# Patient Record
Sex: FEMALE | Race: WHITE | NOT HISPANIC OR LATINO | Employment: OTHER | URBAN - METROPOLITAN AREA
[De-identification: names, ages, dates, MRNs, and addresses within clinical notes are randomized per-mention and may not be internally consistent; named-entity substitution may affect disease eponyms.]

---

## 2017-04-05 ENCOUNTER — ALLSCRIPTS OFFICE VISIT (OUTPATIENT)
Dept: OTHER | Facility: OTHER | Age: 82
End: 2017-04-05

## 2017-04-05 DIAGNOSIS — R05.9 COUGH: ICD-10-CM

## 2017-04-24 ENCOUNTER — ALLSCRIPTS OFFICE VISIT (OUTPATIENT)
Dept: OTHER | Facility: OTHER | Age: 82
End: 2017-04-24

## 2017-08-29 ENCOUNTER — ALLSCRIPTS OFFICE VISIT (OUTPATIENT)
Dept: OTHER | Facility: OTHER | Age: 82
End: 2017-08-29

## 2017-08-29 DIAGNOSIS — E78.00 PURE HYPERCHOLESTEROLEMIA: ICD-10-CM

## 2017-08-29 DIAGNOSIS — R11.0 NAUSEA: ICD-10-CM

## 2017-09-13 ENCOUNTER — GENERIC CONVERSION - ENCOUNTER (OUTPATIENT)
Dept: OTHER | Facility: OTHER | Age: 82
End: 2017-09-13

## 2017-11-10 ENCOUNTER — GENERIC CONVERSION - ENCOUNTER (OUTPATIENT)
Dept: OTHER | Facility: OTHER | Age: 82
End: 2017-11-10

## 2018-01-12 VITALS
OXYGEN SATURATION: 99 % | BODY MASS INDEX: 20.84 KG/M2 | HEIGHT: 59 IN | TEMPERATURE: 97.4 F | HEART RATE: 71 BPM | WEIGHT: 103.38 LBS | DIASTOLIC BLOOD PRESSURE: 70 MMHG | SYSTOLIC BLOOD PRESSURE: 118 MMHG

## 2018-01-12 VITALS
DIASTOLIC BLOOD PRESSURE: 80 MMHG | TEMPERATURE: 97.2 F | HEIGHT: 58 IN | WEIGHT: 105.25 LBS | OXYGEN SATURATION: 97 % | BODY MASS INDEX: 22.09 KG/M2 | SYSTOLIC BLOOD PRESSURE: 140 MMHG | HEART RATE: 82 BPM

## 2018-01-12 NOTE — PROCEDURES
Results/Data    Procedure: Electromyogram and Nerve Conduction Study  Indication: Left Upper Extremity   Referred by Dr Nawaf Nunez  The procedure's were discussed with the patient  Written consent was obtained prior to the procedure and is detailed in the patient's record  Prior to the start of the procedure a time out was taken and the identity of the patient was confirmed via name and date of birth with the patient  The correct site and the procedure to be performed were confirmed  The correct side was confirmed if applicable  The positioning of the patient was verified  The availability of the correct equipment was verified  Procedure Start Time: 9:00    Technique: A sterile concentric needle electrode was used  The patient tolerated the procedure well  There were no complications  Results  EMG LEFT UPPER EXTREMITY    Motor and sensory conduction studies were performed on the left median and ulnar nerves  The median distal motor latency is prolonged at 6 1 ms while the ulnar distal latency was normal  The motor action potential amplitudes were normal  Motor conduction velocities were normal including conduction of the ulnar nerve across the elbow  The left median F waves were relatively prolonged and ulnar F waves were normal     Left median distal sensory latency was prolonged at 4 ms with a delay in palmar stimulation at 2 3 ms  The ulnar distal sensory latency was normal with normal sensory action potential amplitudes  Concentric needle EMG was performed in various distal and proximal muscles of the left upper extremity including APB, FDI, EDC, brachial radialis, biceps, triceps in the low cervical paraspinal region  There was no evidence of spontaneous activity seen   The compound motor unit potentials were of normal configuration and interference patterns were full or full for effort    IMPRESSION: This is an abnormal EMG of the left upper extremity with changes consistent with a localized neuropathic process involving the median nerve at the wrist consistent with carpal tunnel syndrome  These changes are moderate to severe and demyelinative in nature    DOUG Jay        Signatures   Electronically signed by : Andrey Zacarias MD; Jun 9 2016  9:45AM EST                       (Author)

## 2018-01-14 VITALS
WEIGHT: 100 LBS | HEIGHT: 59 IN | HEART RATE: 80 BPM | BODY MASS INDEX: 20.16 KG/M2 | DIASTOLIC BLOOD PRESSURE: 72 MMHG | SYSTOLIC BLOOD PRESSURE: 114 MMHG

## 2018-01-22 VITALS
HEART RATE: 70 BPM | SYSTOLIC BLOOD PRESSURE: 112 MMHG | HEIGHT: 59 IN | DIASTOLIC BLOOD PRESSURE: 70 MMHG | BODY MASS INDEX: 20.76 KG/M2 | WEIGHT: 103.01 LBS

## 2018-02-13 ENCOUNTER — OFFICE VISIT (OUTPATIENT)
Dept: NEUROLOGY | Facility: CLINIC | Age: 83
End: 2018-02-13
Payer: MEDICARE

## 2018-02-13 VITALS
WEIGHT: 103 LBS | DIASTOLIC BLOOD PRESSURE: 72 MMHG | BODY MASS INDEX: 21.16 KG/M2 | HEART RATE: 72 BPM | SYSTOLIC BLOOD PRESSURE: 114 MMHG

## 2018-02-13 DIAGNOSIS — G56.02 CARPAL TUNNEL SYNDROME, LEFT: ICD-10-CM

## 2018-02-13 DIAGNOSIS — G60.9 IDIOPATHIC PERIPHERAL NEUROPATHY: ICD-10-CM

## 2018-02-13 DIAGNOSIS — G20 PARKINSON'S DISEASE (HCC): Primary | ICD-10-CM

## 2018-02-13 DIAGNOSIS — R09.89 BRUIT OF LEFT CAROTID ARTERY: ICD-10-CM

## 2018-02-13 PROCEDURE — 99213 OFFICE O/P EST LOW 20 MIN: CPT | Performed by: PSYCHIATRY & NEUROLOGY

## 2018-02-13 RX ORDER — RASAGILINE 1 MG/1
1 TABLET ORAL DAILY
COMMUNITY
End: 2018-05-14 | Stop reason: SDUPTHER

## 2018-02-13 RX ORDER — ATORVASTATIN CALCIUM 10 MG/1
1 TABLET, FILM COATED ORAL DAILY
COMMUNITY
Start: 2017-11-25 | End: 2018-08-23 | Stop reason: SDUPTHER

## 2018-02-13 RX ORDER — ACETAMINOPHEN 325 MG/1
1 TABLET ORAL DAILY PRN
COMMUNITY

## 2018-02-13 RX ORDER — MULTIVITAMIN
1 CAPSULE ORAL DAILY
COMMUNITY
Start: 1998-10-20 | End: 2018-11-27

## 2018-02-13 NOTE — PROGRESS NOTES
Roberto Peace is a 80 y o  female who returns in follow-up today for Parkinson's disease with a history of carpal tunnel syndrome and neuropathy    Assessment:  1  Parkinson's disease ([address] )    2  Bruit of left carotid artery    3  Idiopathic peripheral neuropathy    4  Carpal tunnel syndrome, left        Plan:  Continue current medications  Carotid ultrasound  Follow-up 3 months     Discussion:  Son Yu has been doing well by decreasing her Sinemet dosage in the evening  She states that when she tried to take half a pill 3 times daily her tremor was more prominent she is not interested in considering DBS  She does have a left carotid bruit which may be secondary to transmitted cardiac murmur  Have recommended carotid ultrasound and she will continue with an aspirin daily    Subjective:    HPI  Son Yu reports that she did try to decrease her Sinemet dosage to a half a pill 3 times daily and when did so she noted more tremor  She has found that by taking a full pill in the morning and around noon and then taking a half a pill in the evening she seems to be doing better  She does note some fatigue especially in the afternoon but finds that she does better if she eats lunch  She denies any symptoms of neuropathic pain and reports that her symptoms of carpal tunnel on the left hand are under good control using a wrist splint at nighttime  She denies any stroke symptoms    Past Medical History:   Diagnosis Date    Cervicalgia     Constipation     CTS (carpal tunnel syndrome)     Hypercholesteremia     Nausea     Parkinson's disease ([facility] [address] )        Family History:  History reviewed  No pertinent family history  Past Surgical History:  Past Surgical History:   Procedure Laterality Date    BACK SURGERY      BREAST LUMPECTOMY Bilateral     NEUROPLASTY / TRANSPOSITION MEDIAN NERVE AT CARPAL TUNNEL         Social History:   reports that she has never smoked   She has never used smokeless tobacco  She reports that she does not drink alcohol or use drugs  Allergies:  Patient has no known allergies  Current Outpatient Prescriptions:     acetaminophen (TYLENOL) 325 mg tablet, Take 1 tablet by mouth daily as needed, Disp: , Rfl:     aspirin 81 MG tablet, Take 1 tablet by mouth daily, Disp: , Rfl:     atorvastatin (LIPITOR) 10 mg tablet, Take 1 tablet by mouth daily, Disp: , Rfl:     carbidopa-levodopa (SINEMET)  mg per tablet, Take 2 5 tablets by mouth daily, Disp: , Rfl:     Multiple Vitamin (MULTIVITAMIN) capsule, 1 capsule daily, Disp: , Rfl:     mupirocin (BACTROBAN) 2 % ointment, Apply topically, Disp: , Rfl:     rasagiline (AZILECT) 1 MG, Take 1 tablet by mouth daily, Disp: , Rfl:     Sennosides 25 MG TABS, Take 1 tablet by mouth daily, Disp: , Rfl:     I have reviewed the past medical, social and family history, current medications, allergies, vitals, review of systems and updated this information as appropriate today     Objective:    Vitals:  Blood pressure 114/72, pulse 72, weight 46 7 kg (103 lb)  Physical Exam    Neurological Exam  GENERAL:  Well-developed well-nourished woman in no acute distress  HEENT/NECK: Head is atraumatic normocephalic, neck is supple  CARDIOVASCULAR:  Left Carotid bruit is noted  NEUROLOGIC:  Mental Status: Awake and alert without aphasia  Cranial Nerves: Extraocular movements are full  Face is symmetrical  Motor:  No drift is noted on arm extension  No rest tremor is noted  Occasional dyskinetic movements were noted  Coordination:  Able to get up out of a chair with arms folded across her chest   Finger-to-nose testing is performed accurately  Gait reveals a normal base with slight decreased arm swing  Romberg is negative          ROS:    Review of Systems   Constitutional: Negative  HENT: Negative  Eyes: Negative  Respiratory: Negative  Cardiovascular: Negative  Gastrointestinal: Negative  Endocrine: Negative  Genitourinary: Negative  Musculoskeletal: Negative  Skin: Negative  Allergic/Immunologic: Negative  Neurological: Negative  Hematological: Bruises/bleeds easily  Psychiatric/Behavioral: Negative

## 2018-02-27 ENCOUNTER — HOSPITAL ENCOUNTER (OUTPATIENT)
Dept: ULTRASOUND IMAGING | Facility: HOSPITAL | Age: 83
Discharge: HOME | End: 2018-02-27
Attending: PSYCHIATRY & NEUROLOGY
Payer: MEDICARE

## 2018-02-27 DIAGNOSIS — R09.89 BRUIT OF LEFT CAROTID ARTERY: ICD-10-CM

## 2018-02-27 PROCEDURE — 93880 EXTRACRANIAL BILAT STUDY: CPT

## 2018-02-27 PROCEDURE — 93880 EXTRACRANIAL BILAT STUDY: CPT | Performed by: SURGERY

## 2018-03-27 DIAGNOSIS — G20 PARKINSON DISEASE (HCC): Primary | ICD-10-CM

## 2018-05-14 ENCOUNTER — OFFICE VISIT (OUTPATIENT)
Dept: NEUROLOGY | Facility: CLINIC | Age: 83
End: 2018-05-14
Payer: MEDICARE

## 2018-05-14 VITALS
HEIGHT: 59 IN | DIASTOLIC BLOOD PRESSURE: 66 MMHG | HEART RATE: 69 BPM | BODY MASS INDEX: 20.96 KG/M2 | WEIGHT: 104 LBS | SYSTOLIC BLOOD PRESSURE: 116 MMHG

## 2018-05-14 DIAGNOSIS — G60.9 IDIOPATHIC PERIPHERAL NEUROPATHY: ICD-10-CM

## 2018-05-14 DIAGNOSIS — G20 PARKINSON'S DISEASE (HCC): Primary | ICD-10-CM

## 2018-05-14 DIAGNOSIS — G56.02 CARPAL TUNNEL SYNDROME, LEFT: ICD-10-CM

## 2018-05-14 PROCEDURE — 99213 OFFICE O/P EST LOW 20 MIN: CPT | Performed by: PSYCHIATRY & NEUROLOGY

## 2018-05-14 RX ORDER — RASAGILINE 1 MG/1
1 TABLET ORAL DAILY
Qty: 30 EACH | Refills: 0 | Status: SHIPPED | OUTPATIENT
Start: 2018-05-14 | End: 2018-11-26 | Stop reason: SDUPTHER

## 2018-05-14 RX ORDER — RASAGILINE 1 MG/1
1 TABLET ORAL DAILY
Qty: 30 EACH | Refills: 5 | Status: SHIPPED | OUTPATIENT
Start: 2018-05-14 | End: 2018-05-14 | Stop reason: SDUPTHER

## 2018-05-14 NOTE — PROGRESS NOTES
Vicente Bae is a 80 y o  female who returns in follow-up today with a history of Parkinson's disease    Assessment:  1  Parkinson's disease ([address] )    2  Idiopathic peripheral neuropathy    3  Carpal tunnel syndrome, left        Plan:  Continue present management  Follow-up 6 months    Discussion:  Suzie Rose is Parkinson's disease is under good control with present management  She will continue with her Sinemet and Azilect  Her carpal tunnel symptoms are under control with a wrist splint and she does have some symptoms of neuropathic pain in her right foot but not bad enough to require medication  If it becomes more severe she will notify me otherwise I will see her back in 6 months      Subjective:    HPI  Suzie Rose reports overall she is doing well  She denies any significant issues with her Parkinson's disease  She states when she walks a distance she usually uses a cane but otherwise her balance has been good as long as she is careful  She has not had any falls  She does report some burning dysesthesia in the toes of her right foot but does not occur frequently or severe enough to warrant treatment  She continues to wear her wrist splint to keep her carpal tunnel symptoms under control  Her carotid ultrasound demonstrated less than 50% stenosis bilaterally      Past Medical History:   Diagnosis Date    Bruit of left carotid artery     Cervicalgia     Constipation     CTS (carpal tunnel syndrome)     Hypercholesteremia     Nausea     Parkinson's disease ([address] )     Tremor        Family History:  Family History   Problem Relation Age of Onset    Cancer Mother        Past Surgical History:  Past Surgical History:   Procedure Laterality Date    BACK SURGERY      BREAST LUMPECTOMY Bilateral     NEUROPLASTY / TRANSPOSITION MEDIAN NERVE AT CARPAL TUNNEL         Social History:   reports that she has never smoked   She has never used smokeless tobacco  She reports that she does not drink alcohol or use drugs  Allergies:  Patient has no known allergies  Current Outpatient Prescriptions:     acetaminophen (TYLENOL) 325 mg tablet, Take 1 tablet by mouth daily as needed, Disp: , Rfl:     aspirin 81 MG tablet, Take 1 tablet by mouth daily, Disp: , Rfl:     atorvastatin (LIPITOR) 10 mg tablet, Take 1 tablet by mouth daily, Disp: , Rfl:     carbidopa-levodopa (SINEMET)  mg per tablet, TAKE ONE TABLET BY MOUTH THREE TIMES DAILY , Disp: 90 tablet, Rfl: 3    Multiple Vitamin (MULTIVITAMIN) capsule, 1 capsule daily, Disp: , Rfl:     mupirocin (BACTROBAN) 2 % ointment, Apply topically, Disp: , Rfl:     rasagiline (AZILECT) 1 MG, Take 1 tablet by mouth daily, Disp: , Rfl:     Sennosides 25 MG TABS, Take 1 tablet by mouth daily, Disp: , Rfl:     I have reviewed the past medical, social and family history, current medications, allergies, vitals, review of systems and updated this information as appropriate today     Objective:    Vitals:  Blood pressure 116/66, pulse 69, height 4' 11" (1 499 m), weight 47 2 kg (104 lb)  Physical Exam    Neurological Exam  GENERAL:  Well-developed well-nourished woman in no acute distress  HEENT/NECK: Head is atraumatic normocephalic, neck is supple  CARDIOVASCULAR:  A right Carotid bruit is noted  NEUROLOGIC:  Mental Status: Awake and alert without aphasia  Cranial Nerves: Extraocular movements are full  Face is symmetrical, a mild masked faces no  Motor:  No drift is noted on arm extension  No rest tremor cogwheeling rigidity is noted  Bradykinesia is noted  Coordination:  Able to get up out of a chair with arms folded across her chest   Gait reveals a decreased arm swing bilaterally with a mild increased base  Romberg reveals mild sway with eyes closed  ROS:    Review of Systems   Constitutional: Positive for fatigue  HENT: Negative  Eyes: Negative  Respiratory: Negative  Cardiovascular: Negative  Gastrointestinal: Negative      Endocrine: Positive for cold intolerance and heat intolerance  Genitourinary: Negative  Musculoskeletal: Negative  Skin: Negative  Allergic/Immunologic: Negative  Neurological: Negative  Hematological: Bruises/bleeds easily  Psychiatric/Behavioral: Negative

## 2018-08-01 DIAGNOSIS — G20 PARKINSON DISEASE (HCC): ICD-10-CM

## 2018-08-13 ENCOUNTER — LAB (OUTPATIENT)
Dept: LAB | Facility: CLINIC | Age: 83
End: 2018-08-13
Payer: MEDICARE

## 2018-08-13 DIAGNOSIS — E78.00 PURE HYPERCHOLESTEROLEMIA: ICD-10-CM

## 2018-08-13 DIAGNOSIS — R11.0 NAUSEA: ICD-10-CM

## 2018-08-13 LAB
ALBUMIN SERPL BCP-MCNC: 4.2 G/DL (ref 3.5–5)
ALP SERPL-CCNC: 65 U/L (ref 46–116)
ALT SERPL W P-5'-P-CCNC: 11 U/L (ref 12–78)
ANION GAP SERPL CALCULATED.3IONS-SCNC: 8 MMOL/L (ref 4–13)
AST SERPL W P-5'-P-CCNC: 10 U/L (ref 5–45)
BASOPHILS # BLD AUTO: 0.02 THOUSANDS/ΜL (ref 0–0.1)
BASOPHILS NFR BLD AUTO: 0 % (ref 0–1)
BILIRUB SERPL-MCNC: 0.69 MG/DL (ref 0.2–1)
BUN SERPL-MCNC: 19 MG/DL (ref 5–25)
CALCIUM SERPL-MCNC: 8.9 MG/DL (ref 8.3–10.1)
CHLORIDE SERPL-SCNC: 103 MMOL/L (ref 100–108)
CHOLEST SERPL-MCNC: 137 MG/DL (ref 50–200)
CO2 SERPL-SCNC: 27 MMOL/L (ref 21–32)
CREAT SERPL-MCNC: 0.81 MG/DL (ref 0.6–1.3)
EOSINOPHIL # BLD AUTO: 0.05 THOUSAND/ΜL (ref 0–0.61)
EOSINOPHIL NFR BLD AUTO: 1 % (ref 0–6)
ERYTHROCYTE [DISTWIDTH] IN BLOOD BY AUTOMATED COUNT: 11.9 % (ref 11.6–15.1)
GFR SERPL CREATININE-BSD FRML MDRD: 67 ML/MIN/1.73SQ M
GLUCOSE P FAST SERPL-MCNC: 102 MG/DL (ref 65–99)
HCT VFR BLD AUTO: 41 % (ref 34.8–46.1)
HDLC SERPL-MCNC: 56 MG/DL (ref 40–60)
HGB BLD-MCNC: 13 G/DL (ref 11.5–15.4)
IMM GRANULOCYTES # BLD AUTO: 0.01 THOUSAND/UL (ref 0–0.2)
IMM GRANULOCYTES NFR BLD AUTO: 0 % (ref 0–2)
LDLC SERPL CALC-MCNC: 60 MG/DL (ref 0–100)
LYMPHOCYTES # BLD AUTO: 1.17 THOUSANDS/ΜL (ref 0.6–4.47)
LYMPHOCYTES NFR BLD AUTO: 25 % (ref 14–44)
MCH RBC QN AUTO: 31.6 PG (ref 26.8–34.3)
MCHC RBC AUTO-ENTMCNC: 31.7 G/DL (ref 31.4–37.4)
MCV RBC AUTO: 100 FL (ref 82–98)
MONOCYTES # BLD AUTO: 0.4 THOUSAND/ΜL (ref 0.17–1.22)
MONOCYTES NFR BLD AUTO: 9 % (ref 4–12)
NEUTROPHILS # BLD AUTO: 3.02 THOUSANDS/ΜL (ref 1.85–7.62)
NEUTS SEG NFR BLD AUTO: 65 % (ref 43–75)
NRBC BLD AUTO-RTO: 0 /100 WBCS
PLATELET # BLD AUTO: 223 THOUSANDS/UL (ref 149–390)
PMV BLD AUTO: 11.3 FL (ref 8.9–12.7)
POTASSIUM SERPL-SCNC: 3.9 MMOL/L (ref 3.5–5.3)
PROT SERPL-MCNC: 8 G/DL (ref 6.4–8.2)
RBC # BLD AUTO: 4.12 MILLION/UL (ref 3.81–5.12)
SODIUM SERPL-SCNC: 138 MMOL/L (ref 136–145)
TRIGL SERPL-MCNC: 103 MG/DL
TSH SERPL DL<=0.05 MIU/L-ACNC: 1.07 UIU/ML (ref 0.36–3.74)
WBC # BLD AUTO: 4.67 THOUSAND/UL (ref 4.31–10.16)

## 2018-08-13 PROCEDURE — 80061 LIPID PANEL: CPT

## 2018-08-13 PROCEDURE — 85025 COMPLETE CBC W/AUTO DIFF WBC: CPT

## 2018-08-13 PROCEDURE — 36415 COLL VENOUS BLD VENIPUNCTURE: CPT

## 2018-08-13 PROCEDURE — 84443 ASSAY THYROID STIM HORMONE: CPT

## 2018-08-13 PROCEDURE — 80053 COMPREHEN METABOLIC PANEL: CPT

## 2018-08-23 DIAGNOSIS — E78.2 MIXED HYPERLIPIDEMIA: Primary | ICD-10-CM

## 2018-08-23 RX ORDER — ATORVASTATIN CALCIUM 10 MG/1
10 TABLET, FILM COATED ORAL DAILY
Qty: 90 TABLET | Refills: 2 | Status: SHIPPED | OUTPATIENT
Start: 2018-08-23 | End: 2019-05-30 | Stop reason: SDUPTHER

## 2018-08-27 ENCOUNTER — OFFICE VISIT (OUTPATIENT)
Dept: NEUROLOGY | Facility: CLINIC | Age: 83
End: 2018-08-27
Payer: MEDICARE

## 2018-08-27 VITALS
HEIGHT: 59 IN | SYSTOLIC BLOOD PRESSURE: 118 MMHG | BODY MASS INDEX: 20.4 KG/M2 | DIASTOLIC BLOOD PRESSURE: 70 MMHG | WEIGHT: 101.2 LBS | HEART RATE: 66 BPM

## 2018-08-27 DIAGNOSIS — G20 PARKINSON'S DISEASE (HCC): Primary | ICD-10-CM

## 2018-08-27 DIAGNOSIS — G60.9 IDIOPATHIC PERIPHERAL NEUROPATHY: ICD-10-CM

## 2018-08-27 PROCEDURE — 99213 OFFICE O/P EST LOW 20 MIN: CPT | Performed by: PSYCHIATRY & NEUROLOGY

## 2018-08-27 NOTE — PROGRESS NOTES
Phylicia Gagnon is a 80 y o  female who returns in follow-up today with history of Parkinson's disease and peripheral neuropathy    Assessment:  1  Parkinson's disease ([facility] [address] )    2  Idiopathic peripheral neuropathy        Plan:  Continue current management  Trial of melatonin at nighttime  Keep follow-up appointment in November    Discussion:  Nickie Loyd reports fatigue during the day  She states that frequently she has to take a couple of naps  She wondered if it was related to the Parkinson's disease  I explained that it was more likely related to the fact that she is not sleeping well at night because she sleeping intermittently during the day  She will try melatonin at nighttime, continue with her current Parkinson's medications and follow up with me in November      Subjective:    HPI  Nickie Loyd returns in follow-up today with a new complaint  She reports that she finds that she is fatigued during the day  She states that after she eats breakfast she often feels tired and takes a nap  She states that after she finishes lunch she also takes a nap  With questioning she states that she does not sleep much at night and she thinks this might be because she is napping during the day  She reports occasional tremor when she is tired but does not note any end of dose phenomena  She tolerates her medicine without adverse effect  She denies any significant issues with neuropathic pain in her feet  She does note that her balance is not as good as it should be and sometimes ambulates with a straight cane    She has not had any falls      Past Medical History:   Diagnosis Date    Bruit of left carotid artery     Cervicalgia     Constipation     CTS (carpal tunnel syndrome)     Hypercholesteremia     Nausea     Parkinson's disease ([facility] [address] [facility]     Tremor        Family History:  Family History   Problem Relation Age of Onset    Cancer Mother     No Known Problems Father     Diabetes Sister     Heart disease Sister Past Surgical History:  Past Surgical History:   Procedure Laterality Date    BACK SURGERY      BREAST LUMPECTOMY Bilateral     NEUROPLASTY / TRANSPOSITION MEDIAN NERVE AT CARPAL TUNNEL         Social History:   reports that she has never smoked  She has never used smokeless tobacco  She reports that she does not drink alcohol or use drugs  Allergies:  Patient has no known allergies  Current Outpatient Prescriptions:     acetaminophen (TYLENOL) 325 mg tablet, Take 1 tablet by mouth daily as needed, Disp: , Rfl:     aspirin 81 MG tablet, Take 1 tablet by mouth daily, Disp: , Rfl:     atorvastatin (LIPITOR) 10 mg tablet, Take 1 tablet (10 mg total) by mouth daily, Disp: 90 tablet, Rfl: 2    carbidopa-levodopa (SINEMET)  mg per tablet, TAKE ONE TABLET BY MOUTH THREE TIMES DAILY  (Patient taking differently: 1 tab Every Morning - 1 tab at 12 pm --- 1/2 tab with Dinner), Disp: 90 tablet, Rfl: 2    Multiple Vitamin (MULTIVITAMIN) capsule, 1 capsule daily, Disp: , Rfl:     mupirocin (BACTROBAN) 2 % ointment, Apply topically, Disp: , Rfl:     rasagiline (AZILECT) 1 MG, Take 1 tablet (1 mg total) by mouth daily, Disp: 30 each, Rfl: 0    Sennosides 25 MG TABS, Take 1 tablet by mouth daily, Disp: , Rfl:     I have reviewed the past medical, social and family history, current medications, allergies, vitals, review of systems and updated this information as appropriate today     Objective:    Vitals:  Blood pressure 118/70, pulse 66, height 4' 10 75" (1 492 m), weight 45 9 kg (101 lb 3 2 oz)  Physical Exam    Neurological Exam  GENERAL:  Well-developed well-nourished woman in no acute distress  HEENT/NECK: Head is atraumatic normocephalic, neck is supple  CARDIOVASCULAR:   Bilateral Carotid bruit, suspect secondary to systolic murmur  NEUROLOGIC:  Mental Status: Awake and alert without aphasia  Cranial Nerves: Extraocular movements are full   Face is symmetrical  Motor:   No drift is noted on arm extension  Bradykinesia is noted  No rest tremors noted  No cogwheeling rigidity is noted  Coordination:   Finger-to-nose testing is performed accurately  Romberg reveals mild sway with eyes closed  Gait reveals a normal base with decreased arm swing  Able to get up out of the chair with arms folded across her chest            ROS:    Review of Systems   Constitutional: Negative  Negative for appetite change and fever  HENT: Positive for trouble swallowing and voice change  Negative for drooling, hearing loss and tinnitus  Eyes: Negative  Negative for photophobia and pain  Respiratory: Positive for shortness of breath  Cardiovascular: Positive for chest pain  Negative for palpitations  Gastrointestinal: Negative  Negative for abdominal pain, nausea and vomiting  Endocrine: Negative  Negative for cold intolerance and heat intolerance  Genitourinary: Positive for urgency  Negative for dysuria and frequency  Musculoskeletal: Positive for back pain and gait problem  Negative for myalgias and neck pain  Skin: Negative  Negative for rash  Neurological: Positive for tremors and numbness  Negative for dizziness, seizures, syncope, facial asymmetry, speech difficulty, weakness, light-headedness and headaches  Hematological: Negative  Does not bruise/bleed easily  Psychiatric/Behavioral: Negative  Negative for confusion, hallucinations and sleep disturbance

## 2018-11-26 DIAGNOSIS — G20 PARKINSON'S DISEASE (HCC): ICD-10-CM

## 2018-11-26 RX ORDER — RASAGILINE 1 MG/1
TABLET ORAL
Qty: 30 TABLET | Refills: 4 | Status: SHIPPED | OUTPATIENT
Start: 2018-11-26 | End: 2019-04-05 | Stop reason: SDUPTHER

## 2018-11-27 ENCOUNTER — OFFICE VISIT (OUTPATIENT)
Dept: NEUROLOGY | Facility: CLINIC | Age: 83
End: 2018-11-27
Payer: MEDICARE

## 2018-11-27 VITALS
WEIGHT: 102 LBS | DIASTOLIC BLOOD PRESSURE: 68 MMHG | HEART RATE: 70 BPM | HEIGHT: 58 IN | BODY MASS INDEX: 21.41 KG/M2 | SYSTOLIC BLOOD PRESSURE: 116 MMHG

## 2018-11-27 DIAGNOSIS — G60.9 IDIOPATHIC PERIPHERAL NEUROPATHY: ICD-10-CM

## 2018-11-27 DIAGNOSIS — G20 PARKINSON'S DISEASE (HCC): Primary | ICD-10-CM

## 2018-11-27 PROCEDURE — 99213 OFFICE O/P EST LOW 20 MIN: CPT | Performed by: PSYCHIATRY & NEUROLOGY

## 2018-11-27 RX ORDER — POLYETHYLENE GLYCOL 3350 17 G/17G
17 POWDER, FOR SOLUTION ORAL DAILY
COMMUNITY

## 2018-11-27 NOTE — PROGRESS NOTES
Rafael Orantes is a 80 y o  female who returns in follow-up today with Parkinson's disease and peripheral neuropathy    Assessment:  1  Parkinson's disease ([address] )    2  Idiopathic peripheral neuropathy        Plan:  Continue same medications, take evening Sinemet a little before dinner  Follow-up 6 months    Discussion:  Overall Darwin Aguilar is doing well from the standpoint of her Parkinson's disease  She does report a bit more tremulousness prior to her evening dose which she takes after dinner  Have recommended taking it about half an hour before dinner  She will continue with her Azilect  She does note some symptoms of neuropathic pain but does not feel it is severe enough to take medication for it  If she does she understands there are medications that might be helpful  She has a left carotid bruit but carotid ultrasound performed in February demonstrated less than 50% stenosis bilaterally        I will otherwise see her back in follow-up in 6 months      Subjective:    HPI  Darwin Aguilar reports overall she is doing fairly well  She states that occasionally she notes some tremulousness, especially in the late afternoon before her evening dose  She tends to take her evening Sinemet after dinner  She has no adverse effects from her medication  She feels that some of her fatigue is related to some back pain issues  She does note some neuropathic pain in her feet characterizes a burning pins and needles type sensation but she does not feel it is severe or frequent enough to require medication    She tries to limit her naps during the day so she can sleep better at night      Past Medical History:   Diagnosis Date    Bruit of left carotid artery     Cervicalgia     Constipation     CTS (carpal tunnel syndrome)     right    Hypercholesteremia     Nausea     Parkinson's disease ([address] )     Tremor        Family History:  Family History   Problem Relation Age of Onset    Cancer Mother     No Known Problems Father  Diabetes Sister     Heart disease Sister        Past Surgical History:  Past Surgical History:   Procedure Laterality Date    BREAST LUMPECTOMY Bilateral     LUMBAR FUSION      NEUROPLASTY / TRANSPOSITION MEDIAN NERVE AT CARPAL TUNNEL Right        Social History:   reports that she has never smoked  She has never used smokeless tobacco  She reports that she does not drink alcohol or use drugs  Allergies:  Patient has no known allergies  Current Outpatient Prescriptions:     acetaminophen (TYLENOL) 325 mg tablet, Take 1 tablet by mouth daily as needed, Disp: , Rfl:     aspirin 81 MG tablet, Take 1 tablet by mouth daily, Disp: , Rfl:     atorvastatin (LIPITOR) 10 mg tablet, Take 1 tablet (10 mg total) by mouth daily, Disp: 90 tablet, Rfl: 2    carbidopa-levodopa (SINEMET)  mg per tablet, TAKE ONE TABLET BY MOUTH THREE TIMES DAILY  (Patient taking differently: 1 tab Every Morning - 1 tab at 12 pm --- 1/2 tab with Dinner), Disp: 90 tablet, Rfl: 2    mupirocin (BACTROBAN) 2 % ointment, Apply topically, Disp: , Rfl:     polyethylene glycol (MIRALAX) 17 g packet, Take 17 g by mouth daily, Disp: , Rfl:     rasagiline (AZILECT) 1 MG, One p o  q day, Disp: 30 tablet, Rfl: 4    Sennosides 25 MG TABS, Take 1 tablet by mouth daily as needed  , Disp: , Rfl:     I have reviewed the past medical, social and family history, current medications, allergies, vitals, review of systems and updated this information as appropriate today    Objective:    Vitals:  Blood pressure 116/68, pulse 70, height 4' 10 25" (1 48 m), weight 46 3 kg (102 lb)  Physical Exam    Neurological Exam  GENERAL:  Well-developed well-nourished woman in no acute distress  HEENT/NECK: Head is atraumatic normocephalic, neck is supple  CARDIOVASCULAR:   A left Carotid bruit is noted  NEUROLOGIC:  Mental Status: Awake and alert without aphasia  Cranial Nerves: Extraocular movements are full   Face is symmetrical, mild masked faces noted  Motor:   No rest tremors noted  Minimal cogwheeling rigidity is noted in the upper extremities with bradykinesia  Coordination:   Gait reveals a normal base with mild decreased arm swing bilaterally  Romberg is negative  She is able to get up out of a chair with arms folded across her chest              ROS:    Review of Systems   Constitutional: Positive for fatigue  Negative for appetite change, chills and fever  HENT: Positive for drooling and voice change  Negative for hearing loss, tinnitus and trouble swallowing  Eyes: Positive for photophobia  Negative for pain and visual disturbance  Respiratory: Negative  Negative for choking and shortness of breath  Cardiovascular: Negative  Negative for palpitations  Gastrointestinal: Negative  Negative for nausea and vomiting  Endocrine: Positive for cold intolerance  Negative for heat intolerance  Genitourinary: Negative  Negative for dysuria, frequency and urgency  Musculoskeletal: Positive for back pain and gait problem  Negative for myalgias, neck pain and neck stiffness  Skin: Negative  Negative for rash  Neurological: Positive for tremors, weakness and numbness  Negative for dizziness, seizures, syncope, facial asymmetry, speech difficulty, light-headedness and headaches  Hematological: Bruises/bleeds easily  Psychiatric/Behavioral: Negative  Negative for confusion, dysphoric mood, hallucinations and sleep disturbance  The patient is not nervous/anxious

## 2018-12-12 DIAGNOSIS — G20 PARKINSON DISEASE (HCC): ICD-10-CM

## 2019-02-07 DIAGNOSIS — G20 PARKINSON DISEASE (HCC): ICD-10-CM

## 2019-04-05 DIAGNOSIS — G20 PARKINSON'S DISEASE (HCC): ICD-10-CM

## 2019-04-05 RX ORDER — RASAGILINE 1 MG/1
TABLET ORAL
Qty: 30 TABLET | Refills: 4 | Status: SHIPPED | OUTPATIENT
Start: 2019-04-05 | End: 2019-09-11 | Stop reason: SDUPTHER

## 2019-05-28 ENCOUNTER — OFFICE VISIT (OUTPATIENT)
Dept: NEUROLOGY | Facility: CLINIC | Age: 84
End: 2019-05-28
Payer: MEDICARE

## 2019-05-28 VITALS
HEART RATE: 68 BPM | WEIGHT: 103 LBS | DIASTOLIC BLOOD PRESSURE: 66 MMHG | HEIGHT: 58 IN | SYSTOLIC BLOOD PRESSURE: 126 MMHG | BODY MASS INDEX: 21.62 KG/M2

## 2019-05-28 DIAGNOSIS — G20 PARKINSON'S DISEASE (HCC): Primary | ICD-10-CM

## 2019-05-28 DIAGNOSIS — G60.9 IDIOPATHIC PERIPHERAL NEUROPATHY: ICD-10-CM

## 2019-05-28 PROCEDURE — 99214 OFFICE O/P EST MOD 30 MIN: CPT | Performed by: PSYCHIATRY & NEUROLOGY

## 2019-05-28 RX ORDER — AMANTADINE HYDROCHLORIDE 100 MG/1
100 CAPSULE, GELATIN COATED ORAL 2 TIMES DAILY
Qty: 60 CAPSULE | Refills: 5 | Status: SHIPPED | OUTPATIENT
Start: 2019-05-28 | End: 2019-07-08

## 2019-05-30 DIAGNOSIS — E78.2 MIXED HYPERLIPIDEMIA: ICD-10-CM

## 2019-05-30 RX ORDER — ATORVASTATIN CALCIUM 10 MG/1
10 TABLET, FILM COATED ORAL DAILY
Qty: 90 TABLET | Refills: 3 | Status: SHIPPED | OUTPATIENT
Start: 2019-05-30 | End: 2019-12-12 | Stop reason: SDUPTHER

## 2019-06-21 ENCOUNTER — TELEPHONE (OUTPATIENT)
Dept: NEUROLOGY | Facility: CLINIC | Age: 84
End: 2019-06-21

## 2019-06-28 ENCOUNTER — OFFICE VISIT (OUTPATIENT)
Dept: FAMILY MEDICINE CLINIC | Facility: CLINIC | Age: 84
End: 2019-06-28
Payer: MEDICARE

## 2019-06-28 VITALS
BODY MASS INDEX: 21.03 KG/M2 | OXYGEN SATURATION: 96 % | WEIGHT: 100.2 LBS | HEART RATE: 94 BPM | HEIGHT: 58 IN | TEMPERATURE: 97.2 F | SYSTOLIC BLOOD PRESSURE: 106 MMHG | DIASTOLIC BLOOD PRESSURE: 60 MMHG

## 2019-06-28 DIAGNOSIS — R11.0 NAUSEA: ICD-10-CM

## 2019-06-28 DIAGNOSIS — G20 PARKINSON'S DISEASE (HCC): Primary | ICD-10-CM

## 2019-06-28 DIAGNOSIS — Z00.00 MEDICARE ANNUAL WELLNESS VISIT, INITIAL: ICD-10-CM

## 2019-06-28 DIAGNOSIS — E78.2 MIXED HYPERLIPIDEMIA: ICD-10-CM

## 2019-06-28 PROCEDURE — 99214 OFFICE O/P EST MOD 30 MIN: CPT | Performed by: FAMILY MEDICINE

## 2019-06-28 PROCEDURE — G0438 PPPS, INITIAL VISIT: HCPCS | Performed by: FAMILY MEDICINE

## 2019-06-28 RX ORDER — ONDANSETRON 4 MG/1
4 TABLET, FILM COATED ORAL EVERY 8 HOURS PRN
Qty: 30 TABLET | Refills: 2 | Status: SHIPPED | OUTPATIENT
Start: 2019-06-28 | End: 2020-01-27 | Stop reason: ALTCHOICE

## 2019-07-02 ENCOUNTER — LAB (OUTPATIENT)
Dept: LAB | Facility: CLINIC | Age: 84
End: 2019-07-02
Payer: MEDICARE

## 2019-07-02 DIAGNOSIS — E78.2 MIXED HYPERLIPIDEMIA: ICD-10-CM

## 2019-07-02 DIAGNOSIS — G20 PARKINSON'S DISEASE (HCC): ICD-10-CM

## 2019-07-02 LAB
ALBUMIN SERPL BCP-MCNC: 4.3 G/DL (ref 3.5–5)
ALP SERPL-CCNC: 86 U/L (ref 46–116)
ALT SERPL W P-5'-P-CCNC: 24 U/L (ref 12–78)
ANION GAP SERPL CALCULATED.3IONS-SCNC: 6 MMOL/L (ref 4–13)
AST SERPL W P-5'-P-CCNC: 10 U/L (ref 5–45)
BASOPHILS # BLD AUTO: 0.02 THOUSANDS/ΜL (ref 0–0.1)
BASOPHILS NFR BLD AUTO: 0 % (ref 0–1)
BILIRUB SERPL-MCNC: 0.62 MG/DL (ref 0.2–1)
BUN SERPL-MCNC: 17 MG/DL (ref 5–25)
CALCIUM SERPL-MCNC: 9.4 MG/DL (ref 8.3–10.1)
CHLORIDE SERPL-SCNC: 102 MMOL/L (ref 100–108)
CHOLEST SERPL-MCNC: 151 MG/DL (ref 50–200)
CO2 SERPL-SCNC: 26 MMOL/L (ref 21–32)
CREAT SERPL-MCNC: 0.79 MG/DL (ref 0.6–1.3)
EOSINOPHIL # BLD AUTO: 0.04 THOUSAND/ΜL (ref 0–0.61)
EOSINOPHIL NFR BLD AUTO: 1 % (ref 0–6)
ERYTHROCYTE [DISTWIDTH] IN BLOOD BY AUTOMATED COUNT: 11.9 % (ref 11.6–15.1)
GFR SERPL CREATININE-BSD FRML MDRD: 69 ML/MIN/1.73SQ M
GLUCOSE P FAST SERPL-MCNC: 89 MG/DL (ref 65–99)
HCT VFR BLD AUTO: 44.2 % (ref 34.8–46.1)
HDLC SERPL-MCNC: 64 MG/DL (ref 40–60)
HGB BLD-MCNC: 14.2 G/DL (ref 11.5–15.4)
IMM GRANULOCYTES # BLD AUTO: 0.01 THOUSAND/UL (ref 0–0.2)
IMM GRANULOCYTES NFR BLD AUTO: 0 % (ref 0–2)
LDLC SERPL CALC-MCNC: 70 MG/DL (ref 0–100)
LYMPHOCYTES # BLD AUTO: 1.21 THOUSANDS/ΜL (ref 0.6–4.47)
LYMPHOCYTES NFR BLD AUTO: 23 % (ref 14–44)
MCH RBC QN AUTO: 32.1 PG (ref 26.8–34.3)
MCHC RBC AUTO-ENTMCNC: 32.1 G/DL (ref 31.4–37.4)
MCV RBC AUTO: 100 FL (ref 82–98)
MONOCYTES # BLD AUTO: 0.47 THOUSAND/ΜL (ref 0.17–1.22)
MONOCYTES NFR BLD AUTO: 9 % (ref 4–12)
NEUTROPHILS # BLD AUTO: 3.46 THOUSANDS/ΜL (ref 1.85–7.62)
NEUTS SEG NFR BLD AUTO: 67 % (ref 43–75)
NRBC BLD AUTO-RTO: 0 /100 WBCS
PLATELET # BLD AUTO: 245 THOUSANDS/UL (ref 149–390)
PMV BLD AUTO: 10 FL (ref 8.9–12.7)
POTASSIUM SERPL-SCNC: 4.1 MMOL/L (ref 3.5–5.3)
PROT SERPL-MCNC: 8.2 G/DL (ref 6.4–8.2)
RBC # BLD AUTO: 4.43 MILLION/UL (ref 3.81–5.12)
SODIUM SERPL-SCNC: 134 MMOL/L (ref 136–145)
TRIGL SERPL-MCNC: 87 MG/DL
WBC # BLD AUTO: 5.21 THOUSAND/UL (ref 4.31–10.16)

## 2019-07-02 PROCEDURE — 80053 COMPREHEN METABOLIC PANEL: CPT

## 2019-07-02 PROCEDURE — 85025 COMPLETE CBC W/AUTO DIFF WBC: CPT

## 2019-07-02 PROCEDURE — 80061 LIPID PANEL: CPT

## 2019-07-02 PROCEDURE — 36415 COLL VENOUS BLD VENIPUNCTURE: CPT

## 2019-07-08 ENCOUNTER — OFFICE VISIT (OUTPATIENT)
Dept: NEUROLOGY | Facility: CLINIC | Age: 84
End: 2019-07-08
Payer: MEDICARE

## 2019-07-08 VITALS
DIASTOLIC BLOOD PRESSURE: 72 MMHG | WEIGHT: 100 LBS | BODY MASS INDEX: 20.99 KG/M2 | SYSTOLIC BLOOD PRESSURE: 122 MMHG | HEART RATE: 88 BPM | HEIGHT: 58 IN

## 2019-07-08 DIAGNOSIS — G60.9 IDIOPATHIC PERIPHERAL NEUROPATHY: ICD-10-CM

## 2019-07-08 DIAGNOSIS — G20 PARKINSON'S DISEASE (HCC): Primary | ICD-10-CM

## 2019-07-08 PROCEDURE — 99213 OFFICE O/P EST LOW 20 MIN: CPT | Performed by: PSYCHIATRY & NEUROLOGY

## 2019-07-08 NOTE — PROGRESS NOTES
Lizy Kemp is a 80 y o  female who returns in follow-up today with history of Parkinson's disease and peripheral neuropathy    Assessment:  1  Parkinson's disease ([facility] [address] )    2  Idiopathic peripheral neuropathy        Plan:  Gocovri 137 mg 1 daily for a week then 2 daily  Continue Sinemet and Azilect  Follow-up 2 months    Discussion:  Davion Zamora continues to note adventitial movements  She did find that amantadine helped these but was not able to tolerate it due to side effects  Have recommended an extended release version of the medication to see if she is able to tolerate this better  She will continue with her current dose of Sinemet and Azilect and I will see her back in follow-up in a couple months      Subjective:    HPI  Davion Zamora returns in follow-up today accompanied by her son  She did take amantadine but had adverse effects from the medication and discontinued it  Unfortunately she found that it was helpful in reducing her adventitial movements  She remains on Sinemet and Azilect and otherwise tolerates these well  She has been having some issues with nausea and is following up with her primary doctor in this regard  She uses MiraLax for her constipation      Past Medical History:   Diagnosis Date    Bruit of left carotid artery     Cervicalgia     Constipation     CTS (carpal tunnel syndrome)     right    Hypercholesteremia     Nausea     Parkinson's disease ([facility] [address] )     Tremor        Family History:  Family History   Problem Relation Age of Onset    Cancer Mother     No Known Problems Father     Diabetes Sister     Heart disease Sister        Past Surgical History:  Past Surgical History:   Procedure Laterality Date    BREAST LUMPECTOMY Bilateral     LUMBAR FUSION      NEUROPLASTY / TRANSPOSITION MEDIAN NERVE AT CARPAL TUNNEL Right        Social History:   reports that she has never smoked   She has never used smokeless tobacco  She reports that she does not drink alcohol or use drugs  Allergies:  Patient has no known allergies  Current Outpatient Medications:     acetaminophen (TYLENOL) 325 mg tablet, Take 1 tablet by mouth daily as needed, Disp: , Rfl:     aspirin 81 MG tablet, Take 1 tablet by mouth daily, Disp: , Rfl:     atorvastatin (LIPITOR) 10 mg tablet, Take 1 tablet (10 mg total) by mouth daily, Disp: 90 tablet, Rfl: 3    carbidopa-levodopa (SINEMET)  mg per tablet, TAKE ONE TABLET BY MOUTH THREE TIMES DAILY , Disp: 90 tablet, Rfl: 5    mupirocin (BACTROBAN) 2 % ointment, Apply topically as needed , Disp: , Rfl:     ondansetron (ZOFRAN) 4 mg tablet, Take 1 tablet (4 mg total) by mouth every 8 (eight) hours as needed for nausea or vomiting, Disp: 30 tablet, Rfl: 2    polyethylene glycol (MIRALAX) 17 g packet, Take 17 g by mouth daily, Disp: , Rfl:     rasagiline (AZILECT) 1 MG, 1 tablet daily  , Disp: 30 tablet, Rfl: 4    Sennosides 25 MG TABS, Take 1 tablet by mouth daily as needed  , Disp: , Rfl:     amantadine (SYMMETREL) 100 mg capsule, Take 1 capsule (100 mg total) by mouth 2 (two) times a day (Patient not taking: Reported on 7/8/2019), Disp: 60 capsule, Rfl: 5    I have reviewed the past medical, social and family history, current medications, allergies, vitals, review of systems and updated this information as appropriate today     Objective:    Vitals:  Blood pressure 122/72, pulse 88, height 4' 10 25" (1 48 m), weight 45 4 kg (100 lb)  Physical Exam    Neurological Exam  GENERAL:  Well-developed well-nourished woman in no acute distress  HEENT/NECK: Head is atraumatic normocephalic, neck is supple  NEUROLOGIC:  Mental Status: Awake and alert without aphasia  Cranial Nerves: Extraocular movements are full  Face is symmetrical  Motor:  Occasional choreoathetotic movements are noted of the trunk and extremities    No rest tremors noted  Coordination:  Gait is stable with a straight cane            ROS:    Review of Systems   Constitutional: Positive for fatigue  HENT: Negative  Eyes: Negative  Respiratory: Negative  Cardiovascular: Negative  Gastrointestinal: Positive for constipation and nausea  Endocrine: Positive for cold intolerance and heat intolerance  Genitourinary: Negative  Musculoskeletal: Positive for back pain  Skin: Negative  Allergic/Immunologic: Negative  Neurological: Positive for dizziness, tremors and weakness  Hematological: Bruises/bleeds easily  Psychiatric/Behavioral: Negative

## 2019-07-09 ENCOUNTER — TELEPHONE (OUTPATIENT)
Dept: NEUROLOGY | Facility: CLINIC | Age: 84
End: 2019-07-09

## 2019-07-09 NOTE — TELEPHONE ENCOUNTER
Patient states she went to  medication at [facility] that was sent yesterday at her appointment and the pharmacy did not have this script  Spoke with [facility], they did not receive this script  Pharmacist confirmed they cannot dispense this medication  It will need to go to a specialty pharmacy  Mitchell Her requires a start form to be filled out and acts as the initial script for the patient  This can be found online on the [facility] providers website under forms (treatment form)  Please complete this form and fax to [phone]  Patient made aware of above, please notify her when form has been faxed

## 2019-07-09 NOTE — TELEPHONE ENCOUNTER
Form has been completed, however patient needs to fill out part of the form and signed it before it can be faxed

## 2019-07-10 NOTE — TELEPHONE ENCOUNTER
Patient notified, she states she may not be able to get here until next week to sign form  Form is located at front in folder

## 2019-07-12 ENCOUNTER — APPOINTMENT (EMERGENCY)
Dept: CT IMAGING | Facility: HOSPITAL | Age: 84
End: 2019-07-12
Payer: MEDICARE

## 2019-07-12 ENCOUNTER — HOSPITAL ENCOUNTER (EMERGENCY)
Facility: HOSPITAL | Age: 84
Discharge: HOME | End: 2019-07-12
Attending: EMERGENCY MEDICINE
Payer: MEDICARE

## 2019-07-12 VITALS
BODY MASS INDEX: 21.19 KG/M2 | WEIGHT: 100.97 LBS | TEMPERATURE: 98.2 F | HEART RATE: 80 BPM | HEIGHT: 58 IN | RESPIRATION RATE: 16 BRPM | DIASTOLIC BLOOD PRESSURE: 60 MMHG | OXYGEN SATURATION: 97 % | SYSTOLIC BLOOD PRESSURE: 110 MMHG

## 2019-07-12 DIAGNOSIS — K59.00 CONSTIPATION: Primary | ICD-10-CM

## 2019-07-12 LAB
ALBUMIN SERPL BCP-MCNC: 3.7 G/DL (ref 3.5–5)
ALP SERPL-CCNC: 73 U/L (ref 46–116)
ALT SERPL W P-5'-P-CCNC: 8 U/L (ref 12–78)
ANION GAP SERPL CALCULATED.3IONS-SCNC: 8 MMOL/L (ref 4–13)
AST SERPL W P-5'-P-CCNC: 11 U/L (ref 5–45)
BACTERIA UR QL AUTO: ABNORMAL /HPF
BASOPHILS # BLD AUTO: 0.03 THOUSANDS/ΜL (ref 0–0.1)
BASOPHILS NFR BLD AUTO: 0 % (ref 0–1)
BILIRUB DIRECT SERPL-MCNC: 0.09 MG/DL (ref 0–0.2)
BILIRUB SERPL-MCNC: 0.4 MG/DL (ref 0.2–1)
BILIRUB UR QL STRIP: NEGATIVE
BUN SERPL-MCNC: 18 MG/DL (ref 5–25)
CALCIUM SERPL-MCNC: 8.8 MG/DL (ref 8.3–10.1)
CHLORIDE SERPL-SCNC: 101 MMOL/L (ref 100–108)
CLARITY UR: CLEAR
CO2 SERPL-SCNC: 29 MMOL/L (ref 21–32)
COLOR UR: YELLOW
CREAT SERPL-MCNC: 0.72 MG/DL (ref 0.6–1.3)
EOSINOPHIL # BLD AUTO: 0.02 THOUSAND/ΜL (ref 0–0.61)
EOSINOPHIL NFR BLD AUTO: 0 % (ref 0–6)
ERYTHROCYTE [DISTWIDTH] IN BLOOD BY AUTOMATED COUNT: 11.8 % (ref 11.6–15.1)
GFR SERPL CREATININE-BSD FRML MDRD: 77 ML/MIN/1.73SQ M
GLUCOSE SERPL-MCNC: 92 MG/DL (ref 65–140)
GLUCOSE UR STRIP-MCNC: NEGATIVE MG/DL
HCT VFR BLD AUTO: 37.7 % (ref 34.8–46.1)
HGB BLD-MCNC: 12.4 G/DL (ref 11.5–15.4)
HGB UR QL STRIP.AUTO: NEGATIVE
IMM GRANULOCYTES # BLD AUTO: 0.06 THOUSAND/UL (ref 0–0.2)
IMM GRANULOCYTES NFR BLD AUTO: 0 % (ref 0–2)
KETONES UR STRIP-MCNC: ABNORMAL MG/DL
LACTATE SERPL-SCNC: 0.8 MMOL/L (ref 0.5–2)
LEUKOCYTE ESTERASE UR QL STRIP: ABNORMAL
LIPASE SERPL-CCNC: 153 U/L (ref 73–393)
LYMPHOCYTES # BLD AUTO: 0.94 THOUSANDS/ΜL (ref 0.6–4.47)
LYMPHOCYTES NFR BLD AUTO: 7 % (ref 14–44)
MCH RBC QN AUTO: 32.3 PG (ref 26.8–34.3)
MCHC RBC AUTO-ENTMCNC: 32.9 G/DL (ref 31.4–37.4)
MCV RBC AUTO: 98 FL (ref 82–98)
MONOCYTES # BLD AUTO: 0.86 THOUSAND/ΜL (ref 0.17–1.22)
MONOCYTES NFR BLD AUTO: 6 % (ref 4–12)
NEUTROPHILS # BLD AUTO: 11.77 THOUSANDS/ΜL (ref 1.85–7.62)
NEUTS SEG NFR BLD AUTO: 87 % (ref 43–75)
NITRITE UR QL STRIP: NEGATIVE
NON-SQ EPI CELLS URNS QL MICRO: ABNORMAL /HPF
NRBC BLD AUTO-RTO: 0 /100 WBCS
PH UR STRIP.AUTO: 5 [PH]
PLATELET # BLD AUTO: 207 THOUSANDS/UL (ref 149–390)
PMV BLD AUTO: 9.6 FL (ref 8.9–12.7)
POTASSIUM SERPL-SCNC: 4.2 MMOL/L (ref 3.5–5.3)
PROT SERPL-MCNC: 7.4 G/DL (ref 6.4–8.2)
PROT UR STRIP-MCNC: NEGATIVE MG/DL
RBC # BLD AUTO: 3.84 MILLION/UL (ref 3.81–5.12)
RBC #/AREA URNS AUTO: ABNORMAL /HPF
SODIUM SERPL-SCNC: 138 MMOL/L (ref 136–145)
SP GR UR STRIP.AUTO: >=1.03 (ref 1–1.03)
UROBILINOGEN UR QL STRIP.AUTO: 0.2 E.U./DL
WBC # BLD AUTO: 13.68 THOUSAND/UL (ref 4.31–10.16)
WBC #/AREA URNS AUTO: ABNORMAL /HPF

## 2019-07-12 PROCEDURE — 83690 ASSAY OF LIPASE: CPT | Performed by: EMERGENCY MEDICINE

## 2019-07-12 PROCEDURE — 81001 URINALYSIS AUTO W/SCOPE: CPT | Performed by: EMERGENCY MEDICINE

## 2019-07-12 PROCEDURE — 83605 ASSAY OF LACTIC ACID: CPT | Performed by: EMERGENCY MEDICINE

## 2019-07-12 PROCEDURE — 99284 EMERGENCY DEPT VISIT MOD MDM: CPT | Performed by: EMERGENCY MEDICINE

## 2019-07-12 PROCEDURE — 74177 CT ABD & PELVIS W/CONTRAST: CPT

## 2019-07-12 PROCEDURE — 96360 HYDRATION IV INFUSION INIT: CPT

## 2019-07-12 PROCEDURE — 36415 COLL VENOUS BLD VENIPUNCTURE: CPT | Performed by: EMERGENCY MEDICINE

## 2019-07-12 PROCEDURE — 85025 COMPLETE CBC W/AUTO DIFF WBC: CPT | Performed by: EMERGENCY MEDICINE

## 2019-07-12 PROCEDURE — 80048 BASIC METABOLIC PNL TOTAL CA: CPT | Performed by: EMERGENCY MEDICINE

## 2019-07-12 PROCEDURE — 80076 HEPATIC FUNCTION PANEL: CPT | Performed by: EMERGENCY MEDICINE

## 2019-07-12 PROCEDURE — 99284 EMERGENCY DEPT VISIT MOD MDM: CPT

## 2019-07-12 RX ORDER — MAG HYDROX/ALUMINUM HYD/SIMETH 400-400-40
1 SUSPENSION, ORAL (FINAL DOSE FORM) ORAL AS NEEDED
Qty: 12 SUPPOSITORY | Refills: 0 | Status: SHIPPED | OUTPATIENT
Start: 2019-07-12 | End: 2020-01-27 | Stop reason: SDDI

## 2019-07-12 RX ORDER — DOCUSATE SODIUM 100 MG/1
100 CAPSULE, LIQUID FILLED ORAL 2 TIMES DAILY PRN
Qty: 60 CAPSULE | Refills: 0 | Status: SHIPPED | OUTPATIENT
Start: 2019-07-12 | End: 2020-07-27 | Stop reason: ALTCHOICE

## 2019-07-12 RX ADMIN — SODIUM CHLORIDE 1000 ML: 0.9 INJECTION, SOLUTION INTRAVENOUS at 16:13

## 2019-07-12 RX ADMIN — IOHEXOL 100 ML: 350 INJECTION, SOLUTION INTRAVENOUS at 17:15

## 2019-07-12 NOTE — ED PROVIDER NOTES
History  Chief Complaint   Patient presents with    Diarrhea     Patient c/o she gets constipation so she took miralax this morning and has been having diarrhea since this morning  Patient is an 20-year-old female with past medical history of Parkinson's disease, hyperlipidemia, chronic constipation, presents to the emergency department, complaining of both constipation and diarrhea  Patient states she has been constipated which is not unusual for her due to her Parkinson's medications and this morning she makes some MiraLax with her coffee and after that she had loose watery stool but it was not a lot of stool per rectum and she feels constipated still  She states she has been leaking stool in her depends this is also very unusual for her  She states last night she felt sharp pain all across her lower abdomen but when she woke up this morning the pain had subsided  She denies any fever, shaking chills, dizziness or near-syncope, URI symptoms, cough, chest pain, shortness of breath, palpitations, abdominal distension, nausea, vomiting, blood per rectum, melena, dysuria, change in urinary frequency, hematuria, flank pain, skin rash or color change, extremity weakness or paresthesia or other focal neurologic deficits  Denies any prior abdominal surgeries  History provided by:  Patient   used: No    Diarrhea   Associated symptoms: abdominal pain    Associated symptoms: no chills, no fever, no headaches and no vomiting        Prior to Admission Medications   Prescriptions Last Dose Informant Patient Reported? Taking? Amantadine HCl ER (GOCOVRI) 137 MG CP24   No No   Sig: One p o  Daily x1 week then 2 p o   Daily   Sennosides 25 MG TABS   Yes No   Sig: Take 1 tablet by mouth daily as needed     acetaminophen (TYLENOL) 325 mg tablet   Yes No   Sig: Take 1 tablet by mouth daily as needed   aspirin 81 MG tablet   Yes No   Sig: Take 1 tablet by mouth daily   atorvastatin (LIPITOR) 10 mg tablet   No No   Sig: Take 1 tablet (10 mg total) by mouth daily   carbidopa-levodopa (SINEMET)  mg per tablet   No No   Sig: TAKE ONE TABLET BY MOUTH THREE TIMES DAILY    mupirocin (BACTROBAN) 2 % ointment  Self Yes No   Sig: Apply topically as needed    ondansetron (ZOFRAN) 4 mg tablet   No No   Sig: Take 1 tablet (4 mg total) by mouth every 8 (eight) hours as needed for nausea or vomiting   polyethylene glycol (MIRALAX) 17 g packet  Self Yes No   Sig: Take 17 g by mouth daily   rasagiline (AZILECT) 1 MG   No No   Si tablet daily  Facility-Administered Medications: None       Past Medical History:   Diagnosis Date    Bruit of left carotid artery     Cervicalgia     Constipation     CTS (carpal tunnel syndrome)     right    Hypercholesteremia     Nausea     Parkinson's disease ([address] )     Tremor        Past Surgical History:   Procedure Laterality Date    BREAST LUMPECTOMY Bilateral     LUMBAR FUSION      NEUROPLASTY / TRANSPOSITION MEDIAN NERVE AT CARPAL TUNNEL Right        Family History   Problem Relation Age of Onset    Cancer Mother     No Known Problems Father     Diabetes Sister     Heart disease Sister      I have reviewed and agree with the history as documented  Social History     Tobacco Use    Smoking status: Never Smoker    Smokeless tobacco: Never Used   Substance Use Topics    Alcohol use: No    Drug use: No        Review of Systems   Constitutional: Negative for chills and fever  HENT: Negative for congestion, ear pain, rhinorrhea and sore throat  Respiratory: Negative for cough, chest tightness, shortness of breath and wheezing  Cardiovascular: Negative for chest pain and palpitations  Gastrointestinal: Positive for abdominal pain, constipation and diarrhea  Negative for abdominal distention, blood in stool, nausea and vomiting  Genitourinary: Negative for dysuria, flank pain, frequency and hematuria     Musculoskeletal: Negative for back pain and neck pain    Skin: Negative for color change and rash  Allergic/Immunologic: Negative for immunocompromised state  Neurological: Negative for dizziness, syncope, weakness, light-headedness, numbness and headaches  Hematological: Negative for adenopathy  Psychiatric/Behavioral: Negative for confusion and decreased concentration  All other systems reviewed and are negative  Physical Exam  Physical Exam   Constitutional: She is oriented to person, place, and time  She appears well-developed and well-nourished  No distress  HENT:   Head: Normocephalic and atraumatic  Mouth/Throat: Oropharynx is clear and moist    Eyes: Pupils are equal, round, and reactive to light  Conjunctivae and EOM are normal    Neck: Normal range of motion  Neck supple  No JVD present  Cardiovascular: Normal rate, regular rhythm, normal heart sounds and intact distal pulses  Exam reveals no gallop and no friction rub  No murmur heard  Pulmonary/Chest: Effort normal and breath sounds normal  No respiratory distress  She has no wheezes  She has no rales  Abdominal: Soft  Bowel sounds are normal  She exhibits no distension  There is tenderness  There is no rebound and no guarding  +Diffuse abdominal tenderness  Musculoskeletal: Normal range of motion  She exhibits no edema or tenderness  Neurological: She is alert and oriented to person, place, and time  No gross motor or sensory deficits  Skin: Skin is warm and dry  No rash noted  She is not diaphoretic  No erythema  No pallor  Psychiatric: She has a normal mood and affect  Her behavior is normal    Nursing note and vitals reviewed        Vital Signs  ED Triage Vitals [07/12/19 1417]   Temperature Pulse Respirations Blood Pressure SpO2   98 2 °F (36 8 °C) 89 20 118/59 96 %      Temp Source Heart Rate Source Patient Position - Orthostatic VS BP Location FiO2 (%)   Oral Monitor Sitting Left arm --      Pain Score       4         Vitals:    07/12/19 1417   BP: 118/59 BP Location: Left arm   Pulse: 89   Resp: 20   Temp: 98 2 °F (36 8 °C)   TempSrc: Oral   SpO2: 96%   Weight: 45 8 kg (100 lb 15 5 oz)   Height: 4' 10" (1 473 m)       Visual Acuity      ED Medications  Medications   sodium chloride 0 9 % bolus 1,000 mL (1,000 mL Intravenous New Bag 7/12/19 1613)   iohexol (OMNIPAQUE) 350 MG/ML injection (MULTI-DOSE) 100 mL (100 mL Intravenous Given 7/12/19 1715)       Diagnostic Studies  Results Reviewed     Procedure Component Value Units Date/Time    Lactic acid, plasma [[id number]]  (Normal) Collected:  07/12/19 1602    Lab Status:  Final result Specimen:  Blood from Arm, Right Updated:  07/12/19 1645     LACTIC ACID 0 8 mmol/L     Narrative:       Result may be elevated if tourniquet was used during collection      Basic metabolic panel [[id number]] Collected:  07/12/19 1602    Lab Status:  Final result Specimen:  Blood from Arm, Right Updated:  07/12/19 1642     Sodium 138 mmol/L      Potassium 4 2 mmol/L      Chloride 101 mmol/L      CO2 29 mmol/L      ANION GAP 8 mmol/L      BUN 18 mg/dL      Creatinine 0 72 mg/dL      Glucose 92 mg/dL      Calcium 8 8 mg/dL      eGFR 77 ml/min/1 73sq m     Narrative:       [place] guidelines for Chronic Kidney Disease (CKD):     Stage 1 with normal or high GFR (GFR > 90 mL/min/1 73 square meters)    Stage 2 Mild CKD (GFR = 60-89 mL/min/1 73 square meters)    Stage 3A Moderate CKD (GFR = 45-59 mL/min/1 73 square meters)    Stage 3B Moderate CKD (GFR = 30-44 mL/min/1 73 square meters)    Stage 4 Severe CKD (GFR = 15-29 mL/min/1 73 square meters)    Stage 5 End Stage CKD (GFR <15 mL/min/1 73 square meters)  Note: GFR calculation is accurate only with a steady state creatinine    Hepatic function panel [[id number]]  (Abnormal) Collected:  07/12/19 1602    Lab Status:  Final result Specimen:  Blood from Arm, Right Updated:  07/12/19 1642     Total Bilirubin 0 40 mg/dL      Bilirubin, Direct 0 09 mg/dL      Alkaline Phosphatase 73 U/L      AST 11 U/L      ALT 8 U/L      Total Protein 7 4 g/dL      Albumin 3 7 g/dL     Lipase [[id number]]  (Normal) Collected:  07/12/19 1602    Lab Status:  Final result Specimen:  Blood from Arm, Right Updated:  07/12/19 1642     Lipase 153 u/L     Urine Microscopic [[id number]]  (Abnormal) Collected:  07/12/19 1617    Lab Status:  Final result Specimen:  Urine, Clean Catch Updated:  07/12/19 1630     RBC, UA 0-1 /hpf      WBC, UA 0-1 /hpf      Epithelial Cells Occasional /hpf      Bacteria, UA Occasional /hpf     UA w Reflex to Microscopic [[id number]]  (Abnormal) Collected:  07/12/19 1617    Lab Status:  Final result Specimen:  Urine, Clean Catch Updated:  07/12/19 1625     Color, UA Yellow     Clarity, UA Clear     Specific Gravity, UA >=1 030     pH, UA 5 0     Leukocytes, UA Trace     Nitrite, UA Negative     Protein, UA Negative mg/dl      Glucose, UA Negative mg/dl      Ketones, UA Trace mg/dl      Urobilinogen, UA 0 2 E U /dl      Bilirubin, UA Negative     Blood, UA Negative    CBC and differential [[id number]]  (Abnormal) Collected:  07/12/19 1602    Lab Status:  Final result Specimen:  Blood from Arm, Right Updated:  07/12/19 1624     WBC 13 68 Thousand/uL      RBC 3 84 Million/uL      Hemoglobin 12 4 g/dL      Hematocrit 37 7 %      MCV 98 fL      MCH 32 3 pg      MCHC 32 9 g/dL      RDW 11 8 %      MPV 9 6 fL      Platelets 405 Thousands/uL      nRBC 0 /100 WBCs      Neutrophils Relative 87 %      Immat GRANS % 0 %      Lymphocytes Relative 7 %      Monocytes Relative 6 %      Eosinophils Relative 0 %      Basophils Relative 0 %      Neutrophils Absolute 11 77 Thousands/µL      Immature Grans Absolute 0 06 Thousand/uL      Lymphocytes Absolute 0 94 Thousands/µL      Monocytes Absolute 0 86 Thousand/µL      Eosinophils Absolute 0 02 Thousand/µL      Basophils Absolute 0 03 Thousands/µL                  CT abdomen pelvis with contrast   Final Result by Mirza Solomon MD (07/12 1741) Prominent diffuse colonic fecal retention  No bowel obstruction  Circumferential rectal wall thickening in keeping with a nonspecific proctitis  Nonspecific focal thickening of the mid sigmoid colon #2/53 possibly secondary to a stercoral colitis  Consider colonoscopy follow-up, if not recently performed, to exclude a sigmoid colonic mass  The study was marked in [facility] for immediate notification  Workstation performed: [id number]                    Procedures  Procedures       ED Course  ED Course as of Jul 12 1837 Fri Jul 12, 2019   6691 Updated patient about CT findings and essentially normal blood work  Will start patient on a strict bowel regimen  She is already prescribe senna sides and admits that she does not take it every day and usually takes it every other day and advised her to continue this every day  Also recommended MiraLax daily as needed  Will start patient on Colace at least once a day to start and advised that she can increase it to twice a day as needed  Also recommended she do Fleet enemas or rectal suppositories  Will refer to GI  Discussed ED return parameters  Identification of Seniors at Risk      Most Recent Value   (ISAR) Identification of Seniors at Risk   Before the illness or injury that brought you to the Emergency, did you need someone to help you on a regular basis? 0 Filed at: 07/12/2019 1419   In the last 24 hours, have you needed more help than usual?  0 Filed at: 07/12/2019 1419   Have you been hospitalized for one or more nights during the past 6 months? 0 Filed at: 07/12/2019 1419   In general, do you see well?  0 Filed at: 07/12/2019 1419   In general, do you have serious problems with your memory? 0 Filed at: 07/12/2019 1419   Do you take more than three different medications every day?   1 Filed at: 07/12/2019 1419   ISAR Score  1 Filed at: 07/12/2019 1419                          MDM  Number of Diagnoses or Management Options  Diagnosis management comments: 80-year-old female presents with loose stool leakage after taking miralax for constipation  She had lower abdominal pain last night but denies pain currently however is diffusely tender  Differential includes constipation, fecal impaction, diverticulitis, appendicitis, small-bowel obstruction  Will check abdominal labs, urinalysis and CT scan of the abdomen and pelvis  If CT unremarkable, will consider discharging home with enemas and referral to GI  Amount and/or Complexity of Data Reviewed  Clinical lab tests: ordered and reviewed  Tests in the radiology section of CPT®: ordered and reviewed  Independent visualization of images, tracings, or specimens: yes        Disposition  Final diagnoses:   Constipation     Time reflects when diagnosis was documented in both MDM as applicable and the Disposition within this note     Time User Action Codes Description Comment    7/12/2019  6:30 PM Michael Freedman [K59 [id number]] Constipation       ED Disposition     ED Disposition Condition Date/Time Comment    Discharge Stable Fri Jul 12, 2019  6:30 PM Sophiaiortentie 2 discharge to home/self care              Follow-up Information     Follow up With Specialties Details Why Contact Info Additional Information    Laurence Thompson MD Family Medicine Schedule an appointment as soon as possible for a visit   21   [facility]  [address] [facility] Gastroenterology Schedule an appointment as soon as possible for a visit   [address] [place] [facility], [address],  [place], [place], [place], [address] [facility] [place] [facility] Schedule an appointment as soon as possible for a visit   [address]  [address] [facility] [id number]  [address] [facility] [place], [place], [facility] , [address]  Emergency Department Emergency Medicine Go to  If symptoms worsen [address] [facility] [place] [id number] [phone] [place] [facility], [address] [place], [place], [address]          Patient's Medications   Discharge Prescriptions    BISACODYL (FLEET) 10 MG/30ML ENEM    Insert 30 mL (10 mg total) into the rectum daily as needed for constipation       Start Date: 7/12/2019 End Date: --       Order Dose: 10 mg       Quantity: 90 mL    Refills: 0    DOCUSATE SODIUM (COLACE) 100 MG CAPSULE    Take 1 capsule (100 mg total) by mouth 2 (two) times a day as needed for constipation       Start Date: 7/12/2019 End Date: --       Order Dose: 100 mg       Quantity: 60 capsule    Refills: 0    GLYCERIN ADULT 2 G SUPPOSITORY    Insert 1 suppository into the rectum as needed for constipation       Start Date: 7/12/2019 End Date: --       Order Dose: 1 suppository       Quantity: 12 suppository    Refills: 0     No discharge procedures on file      ED Provider  Electronically Signed by           Veronica Moreno,   07/12/19 7877

## 2019-07-12 NOTE — DISCHARGE INSTRUCTIONS
Continue taking your Sennosides once daily  Take miralax daily as needed for constipation and Colace 1-2 times daily as needed  Also use the glycerin suppositories, wait one hour, and if no successful bowel movement use the fleet enema  Return to the ED immediately if your pain or constipation worsens, if you develop nausea or vomiting, blood per rectum, fever/chills

## 2019-07-16 ENCOUNTER — OFFICE VISIT (OUTPATIENT)
Dept: GASTROENTEROLOGY | Facility: CLINIC | Age: 84
End: 2019-07-16
Payer: MEDICARE

## 2019-07-16 VITALS
SYSTOLIC BLOOD PRESSURE: 124 MMHG | HEART RATE: 81 BPM | BODY MASS INDEX: 21.03 KG/M2 | HEIGHT: 58 IN | DIASTOLIC BLOOD PRESSURE: 76 MMHG | WEIGHT: 100.2 LBS

## 2019-07-16 DIAGNOSIS — K59.00 CONSTIPATION, UNSPECIFIED CONSTIPATION TYPE: ICD-10-CM

## 2019-07-16 DIAGNOSIS — K64.9 HEMORRHOIDS, UNSPECIFIED HEMORRHOID TYPE: Primary | ICD-10-CM

## 2019-07-16 PROCEDURE — 99214 OFFICE O/P EST MOD 30 MIN: CPT | Performed by: PHYSICIAN ASSISTANT

## 2019-07-16 RX ORDER — DIBUCAINE 0.28 G/28G
OINTMENT TOPICAL 3 TIMES DAILY
Qty: 30 G | Refills: 0 | Status: SHIPPED | OUTPATIENT
Start: 2019-07-16

## 2019-07-16 NOTE — PROGRESS NOTES
[facility] - Outpatient Consultation  Sydnee Ortega 80 y o  female MRN: [id number]  Encounter: [id number]          ASSESSMENT AND PLAN:      1  Hemorrhoids, unspecified hemorrhoid type  2  Constipation, unspecified constipation type  Will start Anusol 2 5% b i d     Will start Nupercainal   Will start align probiotic  Will start benefit fiber b i d  Will continue MiraLax 1 full cap full daily  High-fiber diet given  Patient will utilize 1 fleets enema today  Hold on repeat barium enema, flexible sigmoidoscopy, or colonoscopy   ______________________________________________________________________    HPI:    20-year-old female who is here with constipation, hemorrhoids, and fecal impaction on CAT scan  Patient her daughter report that last Friday she went to the emergency department due to the fact that she was having significant issues with constipation, mucus, and breakthrough diarrhea  In the emergency department patient's CT scan revealed a fecal impaction with stercoral colitis  At the present time the patient reports that she was taking MiraLax as needed  She does not drink enough water  She does not eat enough fiber  She reports that she was taking benefit fiber supplement but her daughter reports that she has not been consistent with this  She reports occasional flecks of blood secondary to hemorrhoids  Patient's last barium enema was 5 or 6 years ago  Patient has not been able to complete a full colonoscopy in several years  Patient does have Parkinson's disease as well as Sjogren's which contributes to the constipation problem  REVIEW OF SYSTEMS:    CONSTITUTIONAL: Denies any fever, chills, rigors, and weight loss  HEENT: No earache or tinnitus  Denies hearing loss or visual disturbances  CARDIOVASCULAR: No chest pain or palpitations  RESPIRATORY: Denies any cough, hemoptysis, shortness of breath or dyspnea on exertion    GASTROINTESTINAL: As noted in the History of Present Illness  GENITOURINARY: No problems with urination  Denies any hematuria or dysuria  NEUROLOGIC: No dizziness or vertigo, denies headaches  MUSCULOSKELETAL: Denies any muscle or joint pain  SKIN: Denies skin rashes or itching  ENDOCRINE: Denies excessive thirst  Denies intolerance to heat or cold  PSYCHOSOCIAL: Denies depression or anxiety  Denies any recent memory loss         Historical Information   Past Medical History:   Diagnosis Date    Bruit of left carotid artery     Cervicalgia     Constipation     CTS (carpal tunnel syndrome)     right    Hypercholesteremia     Nausea     Parkinson's disease ([address] )     Tremor      Past Surgical History:   Procedure Laterality Date    BREAST LUMPECTOMY Bilateral     LUMBAR FUSION      NEUROPLASTY / TRANSPOSITION MEDIAN NERVE AT CARPAL TUNNEL Right      Social History   Social History     Substance and Sexual Activity   Alcohol Use No     Social History     Substance and Sexual Activity   Drug Use No     Social History     Tobacco Use   Smoking Status Never Smoker   Smokeless Tobacco Never Used     Family History   Problem Relation Age of Onset    Cancer Mother     No Known Problems Father     Diabetes Sister     Heart disease Sister        Meds/Allergies       Current Outpatient Medications:     acetaminophen (TYLENOL) 325 mg tablet    Amantadine HCl ER (GOCOVRI) 137 MG CP24    aspirin 81 MG tablet    atorvastatin (LIPITOR) 10 mg tablet    bisacodyl (FLEET) 10 MG/30ML ENEM    carbidopa-levodopa (SINEMET)  mg per tablet    docusate sodium (COLACE) 100 mg capsule    glycerin adult 2 g suppository    mupirocin (BACTROBAN) 2 % ointment    ondansetron (ZOFRAN) 4 mg tablet    polyethylene glycol (MIRALAX) 17 g packet    rasagiline (AZILECT) 1 MG    Sennosides 25 MG TABS    dibucaine (NUPERCAINAL) 1 % ointment    hydrocortisone (ANUSOL-HC, PROCTOSOL HC,) 2 5 % rectal cream    No Known Allergies        Objective Blood pressure 124/76, pulse 81, height 4' 10" (1 473 m), weight 45 5 kg (100 lb 3 2 oz)  Body mass index is 20 94 kg/m²  PHYSICAL EXAM:      General Appearance:   Alert, cooperative, no distress   HEENT:   Normocephalic, atraumatic, anicteric      Neck:  Supple, symmetrical, trachea midline   Lungs:   Clear to auscultation bilaterally; no rales, rhonchi or wheezing; respirations unlabored    Heart[de-identified]   Regular rate and rhythm; no murmur, rub, or gallop  Abdomen:   Soft, non-tender, non-distended; normal bowel sounds; no masses, no organomegaly    Genitalia:   Deferred    Rectal:   Deferred    Extremities:  No cyanosis, clubbing or edema    Pulses:  2+ and symmetric    Skin:  No jaundice, rashes, or lesions    Lymph nodes:  No palpable cervical lymphadenopathy        Lab Results:   No visits with results within 1 Day(s) from this visit     Latest known visit with results is:   Admission on 07/12/2019, Discharged on 07/12/2019   Component Date Value    WBC 07/12/2019 13 68*    RBC 07/12/2019 3 84     Hemoglobin 07/12/2019 12 4     Hematocrit 07/12/2019 37 7     MCV 07/12/2019 98     MCH 07/12/2019 32 3     MCHC 07/12/2019 32 9     RDW 07/12/2019 11 8     MPV 07/12/2019 9 6     Platelets 44/68/3980 207     nRBC 07/12/2019 0     Neutrophils Relative 07/12/2019 87*    Immat GRANS % 07/12/2019 0     Lymphocytes Relative 07/12/2019 7*    Monocytes Relative 07/12/2019 6     Eosinophils Relative 07/12/2019 0     Basophils Relative 07/12/2019 0     Neutrophils Absolute 07/12/2019 11 77*    Immature Grans Absolute 07/12/2019 0 06     Lymphocytes Absolute 07/12/2019 0 94     Monocytes Absolute 07/12/2019 0 86     Eosinophils Absolute 07/12/2019 0 02     Basophils Absolute 07/12/2019 0 03     Sodium 07/12/2019 138     Potassium 07/12/2019 4 2     Chloride 07/12/2019 101     CO2 07/12/2019 29     ANION GAP 07/12/2019 8     BUN 07/12/2019 18     Creatinine 07/12/2019 0 72     Glucose 07/12/2019 92     Calcium 07/12/2019 8 8     eGFR 07/12/2019 77     Total Bilirubin 07/12/2019 0 40     Bilirubin, Direct 07/12/2019 0 09     Alkaline Phosphatase 07/12/2019 73     AST 07/12/2019 11     ALT 07/12/2019 8*    Total Protein 07/12/2019 7 4     Albumin 07/12/2019 3 7     Lipase 07/12/2019 153     LACTIC ACID 07/12/2019 0 8     Color, UA 07/12/2019 Yellow     Clarity, UA 07/12/2019 Clear     Specific Gravity, UA 07/12/2019 >=1 030     pH, UA 07/12/2019 5 0     Leukocytes, UA 07/12/2019 Trace*    Nitrite, UA 07/12/2019 Negative     Protein, UA 07/12/2019 Negative     Glucose, UA 07/12/2019 Negative     Ketones, UA 07/12/2019 Trace*    Urobilinogen, UA 07/12/2019 0 2     Bilirubin, UA 07/12/2019 Negative     Blood, UA 07/12/2019 Negative     RBC, UA 07/12/2019 0-1*    WBC, UA 07/12/2019 0-1*    Epithelial Cells 07/12/2019 Occasional     Bacteria, UA 07/12/2019 Occasional          Radiology Results:   Ct Abdomen Pelvis With Contrast    Result Date: 7/12/2019  Narrative: CT ABDOMEN AND PELVIS WITH IV CONTRAST INDICATION:   has been constipated, then took miralax and only passing loose watery stool without sufficient BM, lower abd pain yesterday  COMPARISON:  None  TECHNIQUE:  CT examination of the abdomen and pelvis was performed  Axial, sagittal, and coronal 2D reformatted images were created from the source data and submitted for interpretation  Radiation dose length product (DLP) for this visit:  409 mGy-cm   This examination, like all CT scans performed in the [facility], was performed utilizing techniques to minimize radiation dose exposure, including the use of iterative reconstruction and automated exposure control  IV Contrast:  100 mL of iohexol (OMNIPAQUE) Enteric Contrast:  Enteric contrast was not administered  FINDINGS: ABDOMEN LOWER CHEST:  Cardiomegaly   LIVER/BILIARY TREE:  Mild periportal edema likely secondary to high aggressive hydration or cardiac disease  GALLBLADDER:  No calcified gallstones  No pericholecystic inflammatory change  SPLEEN:  Unremarkable  PANCREAS:  Unremarkable  ADRENAL GLANDS:  Unremarkable  KIDNEYS/URETERS:  Unremarkable  No hydronephrosis  STOMACH AND BOWEL:  Prominent diffuse colonic fecal retention  No bowel obstruction  Circumferential rectal wall thickening in keeping with a nonspecific proctitis  Focal thickening of the mid sigmoid colon #2/53 possibly secondary to a stercoral colitis  Consider colonoscopy follow-up, if not recently performed, to exclude a sigmoid colonic mass  APPENDIX:  No findings to suggest appendicitis  ABDOMINOPELVIC CAVITY:  No ascites or free intraperitoneal air  No lymphadenopathy  VESSELS:  Unremarkable for patient's age  PELVIS REPRODUCTIVE ORGANS:  Unremarkable for patient's age  URINARY BLADDER:  Unremarkable  ABDOMINAL WALL/INGUINAL REGIONS:  Unremarkable  OSSEOUS STRUCTURES:  No acute fracture or destructive osseous lesion  Fixation hardware of the spinal processes from L3 through L like  Hardware appears intact grade 1 retrolisthesis of L2 on L3  Impression: Prominent diffuse colonic fecal retention  No bowel obstruction  Circumferential rectal wall thickening in keeping with a nonspecific proctitis  Nonspecific focal thickening of the mid sigmoid colon #2/53 possibly secondary to a stercoral colitis  Consider colonoscopy follow-up, if not recently performed, to exclude a sigmoid colonic mass  The study was marked in [facility] for immediate notification   Workstation performed: [id number]

## 2019-07-16 NOTE — PATIENT INSTRUCTIONS
Constipation   WHAT YOU NEED TO KNOW:   Constipation is when you have hard, dry bowel movements, or you go longer than usual between bowel movements  DISCHARGE INSTRUCTIONS:   Return to the emergency department if:   · You have blood in your bowel movements  · You have a fever and abdominal pain with the constipation  Contact your healthcare provider if:   · Your constipation gets worse  · You start to vomit  · You have questions or concerns about your condition or care  Medicines:   · Medicine or a fiber supplement  may help make your bowel movement softer  A laxative may help relax and loosen your intestines to help you have a bowel movement  You may also be given medicine to increase fluid in your intestines  The fluid may help move bowel movements through your intestines  · Take your medicine as directed  Contact your healthcare provider if you think your medicine is not helping or if you have side effects  Tell him of her if you are allergic to any medicine  Keep a list of the medicines, vitamins, and herbs you take  Include the amounts, and when and why you take them  Bring the list or the pill bottles to follow-up visits  Carry your medicine list with you in case of an emergency  Manage your constipation:   · Drink liquids as directed  You may need to drink extra liquids to help soften and move your bowels  Ask how much liquid to drink each day and which liquids are best for you  · Eat high-fiber foods  This may help decrease constipation by adding bulk to your bowel movements  High-fiber foods include fruit, vegetables, whole-grain breads and cereals, and beans  Your healthcare provider or dietitian can help you create a high-fiber meal plan  · Exercise regularly  Regular physical activity can help stimulate your intestines  Ask which exercises are best for you  · Schedule a time each day to have a bowel movement    This may help train your body to have regular bowel movements  Bend forward while you are on the toilet to help move the bowel movement out  Sit on the toilet for at least 10 minutes, even if you do not have a bowel movement  Follow up with your healthcare provider as directed:  Write down your questions so you remember to ask them during your visits  © 2017 [employer] [employer] Information is for End User's use only and may not be sold, redistributed or otherwise used for commercial purposes  All illustrations and images included in CareNotes® are the copyrighted property of [employer]  or [employer] Pena  The above information is an  only  It is not intended as medical advice for individual conditions or treatments  Talk to your doctor, nurse or pharmacist before following any medical regimen to see if it is safe and effective for you

## 2019-07-16 NOTE — LETTER
July 16, 2019     Gwenlyn Schwab, MD  [address]  [address] [facility]  [address]    Patient: Sukhdeep Celestin   YOB: 1935   Date of Visit: 7/16/2019       Dear Dr Carmen Lopez: Thank you for referring Jannie Lopes to me for evaluation  Below are my notes for this consultation  If you have questions, please do not hesitate to call me  I look forward to following your patient along with you  Sincerely,        Senait Cabral PA-C        CC: No Recipients  Cassie Manzanares  7/16/2019 10:42 AM  Sign at close encounter  [facility] - [facility]patient [facility]ation  Sukhdeep Celestin 80 y o  female MRN: [id number]  Encounter: [id number]          ASSESSMENT AND PLAN:      1  Hemorrhoids, unspecified hemorrhoid type  2  Constipation, unspecified constipation type  Will start Anusol 2 5% b i d     Will start Nupercainal   Will start align probiotic  Will start benefit fiber b i d  Will continue MiraLax 1 full cap full daily  High-fiber diet given  Patient will utilize 1 fleets enema today  Hold on repeat barium enema, flexible sigmoidoscopy, or colonoscopy   ______________________________________________________________________    HPI:    20-year-old female who is here with constipation, hemorrhoids, and fecal impaction on CAT scan  Patient her daughter report that last Friday she went to the emergency department due to the fact that she was having significant issues with constipation, mucus, and breakthrough diarrhea  In the emergency department patient's CT scan revealed a fecal impaction with stercoral colitis  At the present time the patient reports that she was taking MiraLax as needed  She does not drink enough water  She does not eat enough fiber  She reports that she was taking benefit fiber supplement but her daughter reports that she has not been consistent with this  She reports occasional flecks of blood secondary to hemorrhoids    Patient's last barium enema was 5 or 6 years ago  Patient has not been able to complete a full colonoscopy in several years  Patient does have Parkinson's disease as well as Sjogren's which contributes to the constipation problem  REVIEW OF SYSTEMS:    CONSTITUTIONAL: Denies any fever, chills, rigors, and weight loss  HEENT: No earache or tinnitus  Denies hearing loss or visual disturbances  CARDIOVASCULAR: No chest pain or palpitations  RESPIRATORY: Denies any cough, hemoptysis, shortness of breath or dyspnea on exertion  GASTROINTESTINAL: As noted in the History of Present Illness  GENITOURINARY: No problems with urination  Denies any hematuria or dysuria  NEUROLOGIC: No dizziness or vertigo, denies headaches  MUSCULOSKELETAL: Denies any muscle or joint pain  SKIN: Denies skin rashes or itching  ENDOCRINE: Denies excessive thirst  Denies intolerance to heat or cold  PSYCHOSOCIAL: Denies depression or anxiety  Denies any recent memory loss         Historical Information   Past Medical History:   Diagnosis Date    Bruit of left carotid artery     Cervicalgia     Constipation     CTS (carpal tunnel syndrome)     right    Hypercholesteremia     Nausea     Parkinson's disease ([address] )     Tremor      Past Surgical History:   Procedure Laterality Date    BREAST LUMPECTOMY Bilateral     LUMBAR FUSION      NEUROPLASTY / TRANSPOSITION MEDIAN NERVE AT CARPAL TUNNEL Right      Social History   Social History     Substance and Sexual Activity   Alcohol Use No     Social History     Substance and Sexual Activity   Drug Use No     Social History     Tobacco Use   Smoking Status Never Smoker   Smokeless Tobacco Never Used     Family History   Problem Relation Age of Onset    Cancer Mother     No Known Problems Father     Diabetes Sister     Heart disease Sister        Meds/Allergies       Current Outpatient Medications:     acetaminophen (TYLENOL) 325 mg tablet    Amantadine HCl ER (GOCOVRI) 137 MG CP24    aspirin 81 MG tablet    atorvastatin (LIPITOR) 10 mg tablet    bisacodyl (FLEET) 10 MG/30ML ENEM    carbidopa-levodopa (SINEMET)  mg per tablet    docusate sodium (COLACE) 100 mg capsule    glycerin adult 2 g suppository    mupirocin (BACTROBAN) 2 % ointment    ondansetron (ZOFRAN) 4 mg tablet    polyethylene glycol (MIRALAX) 17 g packet    rasagiline (AZILECT) 1 MG    Sennosides 25 MG TABS    dibucaine (NUPERCAINAL) 1 % ointment    hydrocortisone (ANUSOL-HC, PROCTOSOL HC,) 2 5 % rectal cream    No Known Allergies        Objective     Blood pressure 124/76, pulse 81, height 4' 10" (1 473 m), weight 45 5 kg (100 lb 3 2 oz)  Body mass index is 20 94 kg/m²  PHYSICAL EXAM:      General Appearance:   Alert, cooperative, no distress   HEENT:   Normocephalic, atraumatic, anicteric      Neck:  Supple, symmetrical, trachea midline   Lungs:   Clear to auscultation bilaterally; no rales, rhonchi or wheezing; respirations unlabored    Heart[de-identified]   Regular rate and rhythm; no murmur, rub, or gallop  Abdomen:   Soft, non-tender, non-distended; normal bowel sounds; no masses, no organomegaly    Genitalia:   Deferred    Rectal:   Deferred    Extremities:  No cyanosis, clubbing or edema    Pulses:  2+ and symmetric    Skin:  No jaundice, rashes, or lesions    Lymph nodes:  No palpable cervical lymphadenopathy        Lab Results:   No visits with results within 1 Day(s) from this visit     Latest known visit with results is:   Admission on 07/12/2019, Discharged on 07/12/2019   Component Date Value    WBC 07/12/2019 13 68*    RBC 07/12/2019 3 84     Hemoglobin 07/12/2019 12 4     Hematocrit 07/12/2019 37 7     MCV 07/12/2019 98     MCH 07/12/2019 32 3     MCHC 07/12/2019 32 9     RDW 07/12/2019 11 8     MPV 07/12/2019 9 6     Platelets 29/52/2173 207     nRBC 07/12/2019 0     Neutrophils Relative 07/12/2019 87*    Immat GRANS % 07/12/2019 0     Lymphocytes Relative 07/12/2019 7*    Monocytes Relative 07/12/2019 6     Eosinophils Relative 07/12/2019 0     Basophils Relative 07/12/2019 0     Neutrophils Absolute 07/12/2019 11 77*    Immature Grans Absolute 07/12/2019 0 06     Lymphocytes Absolute 07/12/2019 0 94     Monocytes Absolute 07/12/2019 0 86     Eosinophils Absolute 07/12/2019 0 02     Basophils Absolute 07/12/2019 0 03     Sodium 07/12/2019 138     Potassium 07/12/2019 4 2     Chloride 07/12/2019 101     CO2 07/12/2019 29     ANION GAP 07/12/2019 8     BUN 07/12/2019 18     Creatinine 07/12/2019 0 72     Glucose 07/12/2019 92     Calcium 07/12/2019 8 8     eGFR 07/12/2019 77     Total Bilirubin 07/12/2019 0 40     Bilirubin, Direct 07/12/2019 0 09     Alkaline Phosphatase 07/12/2019 73     AST 07/12/2019 11     ALT 07/12/2019 8*    Total Protein 07/12/2019 7 4     Albumin 07/12/2019 3 7     Lipase 07/12/2019 153     LACTIC ACID 07/12/2019 0 8     Color, UA 07/12/2019 Yellow     Clarity, UA 07/12/2019 Clear     Specific Gravity, UA 07/12/2019 >=1 030     pH, UA 07/12/2019 5 0     Leukocytes, UA 07/12/2019 Trace*    Nitrite, UA 07/12/2019 Negative     Protein, UA 07/12/2019 Negative     Glucose, UA 07/12/2019 Negative     Ketones, UA 07/12/2019 Trace*    Urobilinogen, UA 07/12/2019 0 2     Bilirubin, UA 07/12/2019 Negative     Blood, UA 07/12/2019 Negative     RBC, UA 07/12/2019 0-1*    WBC, UA 07/12/2019 0-1*    Epithelial Cells 07/12/2019 Occasional     Bacteria, UA 07/12/2019 Occasional          Radiology Results:   Ct Abdomen Pelvis With Contrast    Result Date: 7/12/2019  Narrative: CT ABDOMEN AND PELVIS WITH IV CONTRAST INDICATION:   has been constipated, then took miralax and only passing loose watery stool without sufficient BM, lower abd pain yesterday  COMPARISON:  None  TECHNIQUE:  CT examination of the abdomen and pelvis was performed  Axial, sagittal, and coronal 2D reformatted images were created from the source data and submitted for interpretation  Radiation dose length product (DLP) for this visit:  409 mGy-cm   This examination, like all CT scans performed in the [facility], was performed utilizing techniques to minimize radiation dose exposure, including the use of iterative reconstruction and automated exposure control  IV Contrast:  100 mL of iohexol (OMNIPAQUE) Enteric Contrast:  Enteric contrast was not administered  FINDINGS: ABDOMEN LOWER CHEST:  Cardiomegaly  LIVER/BILIARY TREE:  Mild periportal edema likely secondary to high aggressive hydration or cardiac disease  GALLBLADDER:  No calcified gallstones  No pericholecystic inflammatory change  SPLEEN:  Unremarkable  PANCREAS:  Unremarkable  ADRENAL GLANDS:  Unremarkable  KIDNEYS/URETERS:  Unremarkable  No hydronephrosis  STOMACH AND BOWEL:  Prominent diffuse colonic fecal retention  No bowel obstruction  Circumferential rectal wall thickening in keeping with a nonspecific proctitis  Focal thickening of the mid sigmoid colon #2/53 possibly secondary to a stercoral colitis  Consider colonoscopy follow-up, if not recently performed, to exclude a sigmoid colonic mass  APPENDIX:  No findings to suggest appendicitis  ABDOMINOPELVIC CAVITY:  No ascites or free intraperitoneal air  No lymphadenopathy  VESSELS:  Unremarkable for patient's age  PELVIS REPRODUCTIVE ORGANS:  Unremarkable for patient's age  URINARY BLADDER:  Unremarkable  ABDOMINAL WALL/INGUINAL REGIONS:  Unremarkable  OSSEOUS STRUCTURES:  No acute fracture or destructive osseous lesion  Fixation hardware of the spinal processes from L3 through L like  Hardware appears intact grade 1 retrolisthesis of L2 on L3  Impression: Prominent diffuse colonic fecal retention  No bowel obstruction  Circumferential rectal wall thickening in keeping with a nonspecific proctitis  Nonspecific focal thickening of the mid sigmoid colon #2/53 possibly secondary to a stercoral colitis   Consider colonoscopy follow-up, if not recently performed, to exclude a sigmoid colonic mass  The study was marked in [facility] for immediate notification   Workstation performed: [id number]

## 2019-07-23 ENCOUNTER — TELEPHONE (OUTPATIENT)
Dept: GASTROENTEROLOGY | Facility: CLINIC | Age: 84
End: 2019-07-23

## 2019-07-30 ENCOUNTER — TELEPHONE (OUTPATIENT)
Dept: GASTROENTEROLOGY | Facility: CLINIC | Age: 84
End: 2019-07-30

## 2019-07-30 NOTE — TELEPHONE ENCOUNTER
JOÃO: Spoke with patient  H/O hemorrhoids, constipation    Patient c/o nausea in the afternoon for a few weeks  Patient states she is having normal formed BM now  Patient has ondansetron 4mg at home  She is eating and drinking well  Advised patient to utilize ondansetron PRN and call us if symptoms persists

## 2019-07-30 NOTE — TELEPHONE ENCOUNTER
Shantal Miranda - Patient called - Update - Patient bowel movement good  Patient is feeling nauseous and it is worse in the afternoon    Please call patient at [phone] ty

## 2019-08-12 ENCOUNTER — TELEPHONE (OUTPATIENT)
Dept: GASTROENTEROLOGY | Facility: CLINIC | Age: 84
End: 2019-08-12

## 2019-08-12 NOTE — TELEPHONE ENCOUNTER
Ric pt-  Patient is having a constipation problem     When she does go it's very small stools and leaky stools     She is taking Benefiber, prune juice and has increased her fiber intake with foods  She recently  had a test or scan and is not quite sure about the results     UsesMarven Clifton [phone]  Please phone [phone] to advise  Rosella Goldmann

## 2019-08-12 NOTE — TELEPHONE ENCOUNTER
JOÃO: Spoke with patient  H/O hemorrhoids, constipation    Patient c/o pebble like stools  She is very confused about what her bowel regimen should be  Patient is taking align, benifiber BID, and miralax PRN  Advised patient to start miralax BID until she has a formed BM then stop and start taking it daily  She verbalized understanding  Patient also states she had self stopped on of her parkinsons medication   Advised patient she needs to let her neurologist know ASAP  She will call us if constipation continues

## 2019-08-13 ENCOUNTER — TELEPHONE (OUTPATIENT)
Dept: NEUROLOGY | Facility: CLINIC | Age: 84
End: 2019-08-13

## 2019-08-13 NOTE — TELEPHONE ENCOUNTER
Call received informing us Gocovri requires PA  PA submitted through [facility]  Key: [id number]  Awaiting determination

## 2019-08-16 DIAGNOSIS — G20 PARKINSON DISEASE (HCC): ICD-10-CM

## 2019-08-23 ENCOUNTER — TELEPHONE (OUTPATIENT)
Dept: GASTROENTEROLOGY | Facility: CLINIC | Age: 84
End: 2019-08-23

## 2019-08-23 NOTE — TELEPHONE ENCOUNTER
Spoke with patient  H/O constipation, hemorrhoids    Patient c/o fecal urgency and incontinence, soft BM every day for 2 weeks  She has a normal formed BM in the morning  She stopped miralax over 1 week ago  She is taking benifiber 1tsp TID, and align daily  Denies upper abdominal symptoms, n/v, fever, chills  Her CT scan from 7/12 suggested possible proctitis, and suggested follow-up with a colon  Tiff suggested a flex sig if her symptoms persisted  Would you like patient to have a flex sig done?

## 2019-08-23 NOTE — TELEPHONE ENCOUNTER
Patient called had problem with constipation took miralax and that has resolved but her bowel movements are not what they should be, she is still having fecal incontinence please call patient

## 2019-08-26 ENCOUNTER — PREP FOR PROCEDURE (OUTPATIENT)
Dept: GASTROENTEROLOGY | Facility: CLINIC | Age: 84
End: 2019-08-26

## 2019-08-26 DIAGNOSIS — K59.00 CONSTIPATION, UNSPECIFIED CONSTIPATION TYPE: Primary | ICD-10-CM

## 2019-08-28 ENCOUNTER — TELEPHONE (OUTPATIENT)
Dept: NEUROLOGY | Facility: CLINIC | Age: 84
End: 2019-08-28

## 2019-08-28 DIAGNOSIS — G20 PARKINSON DISEASE (HCC): Primary | ICD-10-CM

## 2019-08-28 NOTE — TELEPHONE ENCOUNTER
I called [facility] and spoke to Army Persons  She does not see any messages that [facility] called our office this am   She then transferred me to the pharmacist and made her aware of below

## 2019-08-28 NOTE — TELEPHONE ENCOUNTER
pt's daughter called back and is unable to confirm medications  she states that the person at [facility] said that gocovri could intact with some parkinsons medications  she is not sure what medication the pharm was speaking of     she is asking that we send this info to [facility]         i called pt and she confirmed taht she is taking  rasagiline 1mg 1 tab daily  carb/levo 25/100mg 1 tab in am, 1 tab at noon and 1/2 tabs dinner  asa 81mg daily  atorvastatin 10mg 1 tab daily  Please advise if you are aware of any interactions with her current medications

## 2019-08-28 NOTE — TELEPHONE ENCOUNTER
Received a call from [facility], stated they were calling on behaf of patient's Devi Mane who was questioning if the Gocovri interacts with any of pt's medications  I did advise that I can reach out to the daughter to confirm all of pt's medications are up to date in the chart, and that we can send this message to the physician  Can you please f/u with this, update patient's medication list with the daughter and forward the message to Dr Jensen Galicia  Thanks!

## 2019-08-28 NOTE — TELEPHONE ENCOUNTER
Nothing comes up in EMR when I order it to suggest an interaction with any of her current medications

## 2019-09-05 ENCOUNTER — ANESTHESIA EVENT (OUTPATIENT)
Dept: GASTROENTEROLOGY | Facility: HOSPITAL | Age: 84
End: 2019-09-05

## 2019-09-05 ENCOUNTER — ANESTHESIA (OUTPATIENT)
Dept: GASTROENTEROLOGY | Facility: HOSPITAL | Age: 84
End: 2019-09-05

## 2019-09-05 ENCOUNTER — HOSPITAL ENCOUNTER (OUTPATIENT)
Dept: GASTROENTEROLOGY | Facility: HOSPITAL | Age: 84
Setting detail: OUTPATIENT SURGERY
Discharge: HOME | End: 2019-09-05
Attending: INTERNAL MEDICINE | Admitting: INTERNAL MEDICINE
Payer: MEDICARE

## 2019-09-05 VITALS
HEART RATE: 82 BPM | DIASTOLIC BLOOD PRESSURE: 60 MMHG | TEMPERATURE: 97.6 F | BODY MASS INDEX: 19.48 KG/M2 | WEIGHT: 92.81 LBS | RESPIRATION RATE: 20 BRPM | SYSTOLIC BLOOD PRESSURE: 98 MMHG | HEIGHT: 58 IN | OXYGEN SATURATION: 97 %

## 2019-09-05 DIAGNOSIS — K59.00 CONSTIPATION, UNSPECIFIED CONSTIPATION TYPE: ICD-10-CM

## 2019-09-05 PROCEDURE — 45330 DIAGNOSTIC SIGMOIDOSCOPY: CPT | Performed by: INTERNAL MEDICINE

## 2019-09-05 RX ORDER — PROPOFOL 10 MG/ML
INJECTION, EMULSION INTRAVENOUS AS NEEDED
Status: DISCONTINUED | OUTPATIENT
Start: 2019-09-05 | End: 2019-09-05 | Stop reason: SURG

## 2019-09-05 RX ORDER — LIDOCAINE HYDROCHLORIDE 10 MG/ML
INJECTION, SOLUTION EPIDURAL; INFILTRATION; INTRACAUDAL; PERINEURAL AS NEEDED
Status: DISCONTINUED | OUTPATIENT
Start: 2019-09-05 | End: 2019-09-05 | Stop reason: SURG

## 2019-09-05 RX ORDER — SODIUM CHLORIDE, SODIUM LACTATE, POTASSIUM CHLORIDE, CALCIUM CHLORIDE 600; 310; 30; 20 MG/100ML; MG/100ML; MG/100ML; MG/100ML
125 INJECTION, SOLUTION INTRAVENOUS CONTINUOUS
Status: DISCONTINUED | OUTPATIENT
Start: 2019-09-05 | End: 2019-09-09 | Stop reason: HOSPADM

## 2019-09-05 RX ORDER — LIDOCAINE HYDROCHLORIDE 10 MG/ML
0.5 INJECTION, SOLUTION EPIDURAL; INFILTRATION; INTRACAUDAL; PERINEURAL ONCE AS NEEDED
Status: DISCONTINUED | OUTPATIENT
Start: 2019-09-05 | End: 2019-09-09 | Stop reason: HOSPADM

## 2019-09-05 RX ADMIN — LIDOCAINE HYDROCHLORIDE 40 MG: 10 INJECTION, SOLUTION EPIDURAL; INFILTRATION; INTRACAUDAL; PERINEURAL at 11:06

## 2019-09-05 RX ADMIN — SODIUM CHLORIDE, SODIUM LACTATE, POTASSIUM CHLORIDE, AND CALCIUM CHLORIDE 125 ML/HR: .6; .31; .03; .02 INJECTION, SOLUTION INTRAVENOUS at 10:39

## 2019-09-05 RX ADMIN — PROPOFOL 40 MG: 10 INJECTION, EMULSION INTRAVENOUS at 11:06

## 2019-09-05 RX ADMIN — PROPOFOL 20 MG: 10 INJECTION, EMULSION INTRAVENOUS at 11:09

## 2019-09-05 RX ADMIN — PROPOFOL 20 MG: 10 INJECTION, EMULSION INTRAVENOUS at 11:10

## 2019-09-05 NOTE — DISCHARGE INSTRUCTIONS
Flexible Sigmoidoscopy   WHAT YOU NEED TO KNOW:   A flexible sigmoidoscopy is a procedure to look inside your rectum and sigmoid colon  The sigmoid colon is the lower part of your intestines, closest to your rectum  Healthcare providers insert a sigmoidoscope into your rectum  This is a soft, bendable tube with a light and tiny camera on the end  Pictures of your colon appear on a monitor during the procedure  A flexible sigmoidoscopy may help diagnose colon diseases, inflammation, polyps (growths), or infections  DISCHARGE INSTRUCTIONS:   Medicines:   · Pain medicine: You may be given medicine to take away or decrease pain  Do not wait until the pain is severe before you take your medicine  · Bowel movement softeners: This medicine makes it easier for you to have a bowel movement  You may need this medicine to prevent constipation  · Take your medicine as directed  Contact your healthcare provider if you think your medicine is not helping or if you have side effects  Tell him or her if you are allergic to any medicine  Keep a list of the medicines, vitamins, and herbs you take  Include the amounts, and when and why you take them  Bring the list or the pill bottles to follow-up visits  Carry your medicine list with you in case of an emergency  Follow up with your healthcare provider as directed: Ask when you should expect the results from your procedure  Write down your questions so you remember to ask them during your visits  Prevent constipation:   · Eat a variety of healthy foods:  Healthy foods include fruits, vegetables, whole-grain breads, low-fat dairy products, beans, lean meats, and fish  · Drink liquids as directed:  Ask your healthcare provider how much liquid to drink each day and which liquids are best for you  · Exercise:  Ask your healthcare provider about the best exercise plan for you  Contact your healthcare provider if:   · You have a fever      · You feel full or bloated  · Your signs and symptoms get worse  · You have nausea or vomiting  · You have questions or concerns about your condition or care  Seek care immediately or call 911 if:   · You are not able to have a bowel movement  · You have blood in your bowel movement  · Your vomit has blood or bile (yellow or green fluid) in it  · Your abdomen becomes tender and hard  © 2017 [employer] [employer] Information is for End User's use only and may not be sold, redistributed or otherwise used for commercial purposes  All illustrations and images included in CareNotes® are the copyrighted property of A D A M [employer]  or Tino Pena  The above information is an  only  It is not intended as medical advice for individual conditions or treatments  Talk to your doctor, nurse or pharmacist before following any medical regimen to see if it is safe and effective for you

## 2019-09-05 NOTE — ANESTHESIA POSTPROCEDURE EVALUATION
Post-Op Assessment Note    CV Status:  Stable  Pain Score: 0    Pain management: adequate     Mental Status:  Sleepy   Hydration Status:  Stable   PONV Controlled:  None   Airway Patency:  Patent and adequate   Post Op Vitals Reviewed: Yes      Staff: CRNA           /50 (09/05/19 1115)    Temp 97 6 °F (36 4 °C) (09/05/19 1115)    Pulse 67 (09/05/19 1115)   Resp 22 (09/05/19 1115)    SpO2 97 % (09/05/19 1115)

## 2019-09-05 NOTE — ANESTHESIA PREPROCEDURE EVALUATION
Review of Systems/Medical History  Patient summary reviewed  Chart reviewed  No history of anesthetic complications     Cardiovascular  Exercise tolerance (METS): >4,  Hyperlipidemia, No CAD , No cardiac stents     Pulmonary  Not a smoker , No asthma , No shortness of breath, No recent URI ,        GI/Hepatic            Endo/Other     GYN       Hematology   Musculoskeletal       Neurology    No TIA, No CVA ,   Comment: [facility]           Physical Exam    Airway    Mallampati score: II  TM Distance: >3 FB       Dental       Cardiovascular      Pulmonary      Other Findings        Anesthesia Plan  ASA Score- 2     Anesthesia Type- IV sedation with anesthesia with ASA Monitors  Additional Monitors:   Airway Plan:     Comment: DOUG Hernandes , have personally seen and evaluated the patient prior to anesthetic care  I have reviewed the pre-anesthetic record, and other medical records if appropriate to the anesthetic care  If a CRNA is involved in the case, I have reviewed the CRNA assessment, if present, and agree  Risks/benefits and alternatives discussed with patient including possible PONV, sore throat, and possibility of rare anesthetic and surgical emergencies        Plan Factors-    Induction-     Postoperative Plan-     Informed Consent- Anesthetic plan and risks discussed with patient  I personally reviewed this patient with the CRNA  Discussed and agreed on the Anesthesia Plan with the CRNA  Violeta Sumner

## 2019-09-05 NOTE — H&P
History and Physical -  [facility]  Phylicia Gagnon 80 y o  female MRN: [id number]      HPI: Phylicia Gagnon is a 80y o  year old female who presents for constipation, abdominal pain      REVIEW OF SYSTEMS: Per the HPI, and otherwise unremarkable  Historical Information   Past Medical History:   Diagnosis Date    Bruit of left carotid artery     Cervicalgia     Constipation     CTS (carpal tunnel syndrome)     right    Hypercholesteremia     Nausea     Parkinson's disease ([address] )     Tremor      Past Surgical History:   Procedure Laterality Date    BREAST LUMPECTOMY Bilateral     LUMBAR FUSION      NEUROPLASTY / TRANSPOSITION MEDIAN NERVE AT CARPAL TUNNEL Right      Social History   Social History     Substance and Sexual Activity   Alcohol Use No     Social History     Substance and Sexual Activity   Drug Use No     Social History     Tobacco Use   Smoking Status Never Smoker   Smokeless Tobacco Never Used     Family History   Problem Relation Age of Onset    Cancer Mother     No Known Problems Father     Diabetes Sister     Heart disease Sister        Meds/Allergies       (Not in a hospital admission)    No Known Allergies    Objective     Blood pressure 123/71, pulse 80, temperature (!) 97 4 °F (36 3 °C), temperature source Temporal, resp  rate 16, SpO2 98 %  PHYSICAL EXAM    Gen: NAD  CV: RRR  CHEST: Clear  ABD: soft, NT/ND  EXT: no edema      ASSESSMENT/PLAN:  This is a 80y o  year old female here for flexible sigmoidoscopy, and she is stable and optimized for her procedure

## 2019-09-11 DIAGNOSIS — G20 PARKINSON'S DISEASE (HCC): ICD-10-CM

## 2019-09-11 RX ORDER — RASAGILINE 1 MG/1
TABLET ORAL
Qty: 30 TABLET | Refills: 5 | Status: SHIPPED | OUTPATIENT
Start: 2019-09-11 | End: 2020-03-12

## 2019-09-23 ENCOUNTER — OFFICE VISIT (OUTPATIENT)
Dept: GASTROENTEROLOGY | Facility: CLINIC | Age: 84
End: 2019-09-23
Payer: MEDICARE

## 2019-09-23 VITALS
BODY MASS INDEX: 19.98 KG/M2 | HEART RATE: 65 BPM | HEIGHT: 58 IN | WEIGHT: 95.2 LBS | SYSTOLIC BLOOD PRESSURE: 120 MMHG | DIASTOLIC BLOOD PRESSURE: 60 MMHG

## 2019-09-23 DIAGNOSIS — K59.00 CONSTIPATION, UNSPECIFIED CONSTIPATION TYPE: Primary | ICD-10-CM

## 2019-09-23 PROCEDURE — 99213 OFFICE O/P EST LOW 20 MIN: CPT | Performed by: PHYSICIAN ASSISTANT

## 2019-09-23 NOTE — PATIENT INSTRUCTIONS
Constipation   WHAT YOU NEED TO KNOW:   Constipation is when you have hard, dry bowel movements, or you go longer than usual between bowel movements  DISCHARGE INSTRUCTIONS:   Return to the emergency department if:   · You have blood in your bowel movements  · You have a fever and abdominal pain with the constipation  Contact your healthcare provider if:   · Your constipation gets worse  · You start to vomit  · You have questions or concerns about your condition or care  Medicines:   · Medicine or a fiber supplement  may help make your bowel movement softer  A laxative may help relax and loosen your intestines to help you have a bowel movement  You may also be given medicine to increase fluid in your intestines  The fluid may help move bowel movements through your intestines  · Take your medicine as directed  Contact your healthcare provider if you think your medicine is not helping or if you have side effects  Tell him of her if you are allergic to any medicine  Keep a list of the medicines, vitamins, and herbs you take  Include the amounts, and when and why you take them  Bring the list or the pill bottles to follow-up visits  Carry your medicine list with you in case of an emergency  Manage your constipation:   · Drink liquids as directed  You may need to drink extra liquids to help soften and move your bowels  Ask how much liquid to drink each day and which liquids are best for you  · Eat high-fiber foods  This may help decrease constipation by adding bulk to your bowel movements  High-fiber foods include fruit, vegetables, whole-grain breads and cereals, and beans  Your healthcare provider or dietitian can help you create a high-fiber meal plan  · Exercise regularly  Regular physical activity can help stimulate your intestines  Ask which exercises are best for you  · Schedule a time each day to have a bowel movement    This may help train your body to have regular bowel movements  Bend forward while you are on the toilet to help move the bowel movement out  Sit on the toilet for at least 10 minutes, even if you do not have a bowel movement  Follow up with your healthcare provider as directed:  Write down your questions so you remember to ask them during your visits  © 2017 [employer] Information is for End User's use only and may not be sold, redistributed or otherwise used for commercial purposes  All illustrations and images included in CareNotes® are the copyrighted property of [employer]  or Tino Pena  The above information is an  only  It is not intended as medical advice for individual conditions or treatments  Talk to your doctor, nurse or pharmacist before following any medical regimen to see if it is safe and effective for you

## 2019-09-23 NOTE — PROGRESS NOTES
[facility] - Outpatient Follow-up Note  Isidro Hawkins 80 y o  female MRN: [id number]  Encounter: [id number]          ASSESSMENT AND PLAN:      1  Constipation, unspecified constipation type  Will bowel regimen  No plans for repeat endoscopic evaluation  Will have patient follow up in 6-8 weeks  ______________________________________________________________________    SUBJECTIVE:   Patient is here for follow-up after her flexible sigmoidoscopy  Patient's flexible sigmoidoscopy did show diverticular disease  Patient is still reporting on and off constipation but she is on a regimen daily now of Colace and fiber  She reports this regimen is helping her  She reports that occasionally she will have to take a dose of MiraLax  She denies any severe abdominal pain  She denies any nausea or vomiting  She denies any melena or rectal bleeding  REVIEW OF SYSTEMS IS OTHERWISE NEGATIVE        Historical Information   Past Medical History:   Diagnosis Date    Bruit of left carotid artery     Cervicalgia     Constipation     CTS (carpal tunnel syndrome)     right    Hypercholesteremia     Nausea     Parkinson's disease ([address]     Tremor      Past Surgical History:   Procedure Laterality Date    BREAST LUMPECTOMY Bilateral     LUMBAR FUSION      NEUROPLASTY / TRANSPOSITION MEDIAN NERVE AT CARPAL TUNNEL Right      Social History   Social History     Substance and Sexual Activity   Alcohol Use No     Social History     Substance and Sexual Activity   Drug Use No     Social History     Tobacco Use   Smoking Status Never Smoker   Smokeless Tobacco Never Used     Family History   Problem Relation Age of Onset    Cancer Mother     No Known Problems Father     Diabetes Sister     Heart disease Sister        Meds/Allergies       Current Outpatient Medications:     acetaminophen (TYLENOL) 325 mg tablet    Amantadine HCl ER (GOCOVRI) 137 MG CP24    aspirin 81 MG tablet   atorvastatin (LIPITOR) 10 mg tablet    bisacodyl (FLEET) 10 MG/30ML ENEM    carbidopa-levodopa (SINEMET)  mg per tablet    dibucaine (NUPERCAINAL) 1 % ointment    docusate sodium (COLACE) 100 mg capsule    glycerin adult 2 g suppository    hydrocortisone (ANUSOL-HC, PROCTOSOL HC,) 2 5 % rectal cream    mupirocin (BACTROBAN) 2 % ointment    ondansetron (ZOFRAN) 4 mg tablet    polyethylene glycol (MIRALAX) 17 g packet    rasagiline (AZILECT) 1 MG    Sennosides 25 MG TABS    No Known Allergies        Objective     Blood pressure 120/60, pulse 65, height 4' 10" (1 473 m), weight 43 2 kg (95 lb 3 2 oz)  Body mass index is 19 9 kg/m²  PHYSICAL EXAM:      General Appearance:   Alert, cooperative, no distress   HEENT:   Normocephalic, atraumatic, anicteric      Neck:  Supple, symmetrical, trachea midline   Lungs:   Clear to auscultation bilaterally; no rales, rhonchi or wheezing; respirations unlabored    Heart[de-identified]   Regular rate and rhythm; no murmur, rub, or gallop  Abdomen:   Soft, non-tender, non-distended; normal bowel sounds; no masses, no organomegaly    Genitalia:   Deferred    Rectal:   Deferred    Extremities:  No cyanosis, clubbing or edema    Pulses:  2+ and symmetric    Skin:  No jaundice, rashes, or lesions    Lymph nodes:  No palpable cervical lymphadenopathy        Lab Results:   No visits with results within 1 Day(s) from this visit     Latest known visit with results is:   Admission on 07/12/2019, Discharged on 07/12/2019   Component Date Value    WBC 07/12/2019 13 68*    RBC 07/12/2019 3 84     Hemoglobin 07/12/2019 12 4     Hematocrit 07/12/2019 37 7     MCV 07/12/2019 98     MCH 07/12/2019 32 3     MCHC 07/12/2019 32 9     RDW 07/12/2019 11 8     MPV 07/12/2019 9 6     Platelets 69/03/4053 207     nRBC 07/12/2019 0     Neutrophils Relative 07/12/2019 87*    Immat GRANS % 07/12/2019 0     Lymphocytes Relative 07/12/2019 7*    Monocytes Relative 07/12/2019 6     Eosinophils Relative 07/12/2019 0     Basophils Relative 07/12/2019 0     Neutrophils Absolute 07/12/2019 11 77*    Immature Grans Absolute 07/12/2019 0 06     Lymphocytes Absolute 07/12/2019 0 94     Monocytes Absolute 07/12/2019 0 86     Eosinophils Absolute 07/12/2019 0 02     Basophils Absolute 07/12/2019 0 03     Sodium 07/12/2019 138     Potassium 07/12/2019 4 2     Chloride 07/12/2019 101     CO2 07/12/2019 29     ANION GAP 07/12/2019 8     BUN 07/12/2019 18     Creatinine 07/12/2019 0 72     Glucose 07/12/2019 92     Calcium 07/12/2019 8 8     eGFR 07/12/2019 77     Total Bilirubin 07/12/2019 0 40     Bilirubin, Direct 07/12/2019 0 09     Alkaline Phosphatase 07/12/2019 73     AST 07/12/2019 11     ALT 07/12/2019 8*    Total Protein 07/12/2019 7 4     Albumin 07/12/2019 3 7     Lipase 07/12/2019 153     LACTIC ACID 07/12/2019 0 8     Color, UA 07/12/2019 Yellow     Clarity, UA 07/12/2019 Clear     Specific Gravity, UA 07/12/2019 >=1 030     pH, UA 07/12/2019 5 0     Leukocytes, UA 07/12/2019 Trace*    Nitrite, UA 07/12/2019 Negative     Protein, UA 07/12/2019 Negative     Glucose, UA 07/12/2019 Negative     Ketones, UA 07/12/2019 Trace*    Urobilinogen, UA 07/12/2019 0 2     Bilirubin, UA 07/12/2019 Negative     Blood, UA 07/12/2019 Negative     RBC, UA 07/12/2019 0-1*    WBC, UA 07/12/2019 0-1*    Epithelial Cells 07/12/2019 Occasional     Bacteria, UA 07/12/2019 Occasional          Radiology Results:   No results found

## 2019-10-01 ENCOUNTER — TELEPHONE (OUTPATIENT)
Dept: NEUROLOGY | Facility: CLINIC | Age: 84
End: 2019-10-01

## 2019-10-01 DIAGNOSIS — G20 PARKINSON'S DISEASE (HCC): ICD-10-CM

## 2019-10-01 NOTE — TELEPHONE ENCOUNTER
She has dizziness with 1 tab, do you want her to continue with 1 tab?     She never try 2 tabs because she had dizziness with 1 tab

## 2019-10-01 NOTE — TELEPHONE ENCOUNTER
If the side effects are too severe she may discontinue it  There is not much else to for her to try for her symptoms

## 2019-10-01 NOTE — TELEPHONE ENCOUNTER
juany from [facility] called and states that they contacted the pt for delivery of gocovri and the pt told her that she decreased dose of gocoveri on her own  she decreased to 1 tab at hs due to dizziness and constipation  they need to confirm this with our office and would need a new script for the lower dose sent or they can annotate script on file  [phone]    i called pt and she states that she never increased dose to 2 tabs  she states that when she takes 1 tab it makes her dizzy and makes her constiaption worse  dizziness lasts all day long  the only benefit she sees is that her legs are better since starting  But no effect on hand tremors      please advise  [phone] to leave a detailed message

## 2019-10-01 NOTE — TELEPHONE ENCOUNTER
If patient is noted some benefit from 1 daily and not able to tolerate 2 she may continue with 1 daily    Thank you

## 2019-10-01 NOTE — TELEPHONE ENCOUNTER
Patient advised  At this time she will continue to take what she has left in her bottle, she will see what happens with her symptoms  She states she will call back and let us know  If she is feeling any better at that time

## 2019-10-02 NOTE — TELEPHONE ENCOUNTER
Received call from [facility] stating that they need the time of day that pt advised to take medication specified on the script, as it is only recommended to take at bedtime   I called the patient who confirms she takes this at 9pm  I then called this in to pharmacist Lee BONDS

## 2019-11-11 ENCOUNTER — OFFICE VISIT (OUTPATIENT)
Dept: GASTROENTEROLOGY | Facility: CLINIC | Age: 84
End: 2019-11-11
Payer: MEDICARE

## 2019-11-11 VITALS
WEIGHT: 97.4 LBS | DIASTOLIC BLOOD PRESSURE: 64 MMHG | HEIGHT: 58 IN | BODY MASS INDEX: 20.45 KG/M2 | HEART RATE: 72 BPM | SYSTOLIC BLOOD PRESSURE: 126 MMHG

## 2019-11-11 DIAGNOSIS — K59.00 CONSTIPATION, UNSPECIFIED CONSTIPATION TYPE: Primary | ICD-10-CM

## 2019-11-11 PROCEDURE — 99214 OFFICE O/P EST MOD 30 MIN: CPT | Performed by: PHYSICIAN ASSISTANT

## 2019-11-11 NOTE — LETTER
November 11, 2019     Leatha Bullard MD  [address]  [address]    Patient: Phuong Espinosa   YOB: 1935   Date of Visit: 11/11/2019       Dear Dr Aminta Pike: Thank you for referring Kirill Khan to me for evaluation  Below are my notes for this consultation  If you have questions, please do not hesitate to call me  I look forward to following your patient along with you  Sincerely,        Callie Ham PA-C        CC: No Recipients  Callie Ham PA-C  11/11/2019 12:50 PM  Sign at close encounter  [facility] - Outpatient Follow-up Note  Phuong Espinosa 80 y o  female MRN: [id number]  Encounter: [id number]          ASSESSMENT AND PLAN:      1  Constipation, unspecified constipation type  Will utilize MiraLax 1 capsule daily  Patient will continue utilizing benefiber 2 teaspoons b i d   No plans for any repeat endoscopic evaluation  Patient will follow up in January 2020  Patient will call the office with any alarm symptoms  ______________________________________________________________________    SUBJECTIVE:   51-year-old female who is here for follow-up of chronic constant  Patient reports that right now she is utilizing MiraLax p r n  As well as benefit fiber twice a day and she is having irregular bowel movements  At the present time patient's biggest complaint is fatigue and tiredness in the afternoon every day  Patient reports no abdominal pain  Patient denies nausea or vomiting  Patient denies any rectal bleeding or melena  Patient's weight is going up  Patient has gained 5 pounds in the last 2 months  Patient reports her appetite is well  Patient is still concerned that she is not having a bowel movement every day  Patient's flexible sigmoidoscopy was reviewed with patient in detail  REVIEW OF SYSTEMS IS OTHERWISE NEGATIVE        Historical Information   Past Medical History:   Diagnosis Date    Bruit of left carotid artery     Cervicalgia     Constipation     CTS (carpal tunnel syndrome)     right    Hypercholesteremia     Nausea     Parkinson's disease ([address] )     Tremor      Past Surgical History:   Procedure Laterality Date    BREAST LUMPECTOMY Bilateral     LUMBAR FUSION      NEUROPLASTY / TRANSPOSITION MEDIAN NERVE AT CARPAL TUNNEL Right      Social History   Social History     Substance and Sexual Activity   Alcohol Use No     Social History     Substance and Sexual Activity   Drug Use No     Social History     Tobacco Use   Smoking Status Never Smoker   Smokeless Tobacco Never Used     Family History   Problem Relation Age of Onset    Cancer Mother     No Known Problems Father     Diabetes Sister     Heart disease Sister        Meds/Allergies       Current Outpatient Medications:     acetaminophen (TYLENOL) 325 mg tablet    Amantadine HCl ER (GOCOVRI) 137 MG CP24    aspirin 81 MG tablet    atorvastatin (LIPITOR) 10 mg tablet    bisacodyl (FLEET) 10 MG/30ML ENEM    carbidopa-levodopa (SINEMET)  mg per tablet    dibucaine (NUPERCAINAL) 1 % ointment    docusate sodium (COLACE) 100 mg capsule    glycerin adult 2 g suppository    hydrocortisone (ANUSOL-HC, PROCTOSOL HC,) 2 5 % rectal cream    mupirocin (BACTROBAN) 2 % ointment    ondansetron (ZOFRAN) 4 mg tablet    polyethylene glycol (MIRALAX) 17 g packet    rasagiline (AZILECT) 1 MG    Sennosides 25 MG TABS    No Known Allergies        Objective     Blood pressure 126/64, pulse 72, height 4' 10" (1 473 m), weight 44 2 kg (97 lb 6 4 oz)  Body mass index is 20 36 kg/m²  PHYSICAL EXAM:      General Appearance:   Alert, cooperative, no distress   HEENT:   Normocephalic, atraumatic, anicteric      Neck:  Supple, symmetrical, trachea midline   Lungs:   Clear to auscultation bilaterally; no rales, rhonchi or wheezing; respirations unlabored    Heart[de-identified]   Regular rate and rhythm; no murmur, rub, or gallop     Abdomen:   Soft, non-tender, non-distended; normal bowel sounds; no masses, no organomegaly    Genitalia:   Deferred    Rectal:   Deferred    Extremities:  No cyanosis, clubbing or edema    Pulses:  2+ and symmetric    Skin:  No jaundice, rashes, or lesions    Lymph nodes:  No palpable cervical lymphadenopathy        Lab Results:   No visits with results within 1 Day(s) from this visit     Latest known visit with results is:   Admission on 07/12/2019, Discharged on 07/12/2019   Component Date Value    WBC 07/12/2019 13 68*    RBC 07/12/2019 3 84     Hemoglobin 07/12/2019 12 4     Hematocrit 07/12/2019 37 7     MCV 07/12/2019 98     MCH 07/12/2019 32 3     MCHC 07/12/2019 32 9     RDW 07/12/2019 11 8     MPV 07/12/2019 9 6     Platelets 41/02/4885 207     nRBC 07/12/2019 0     Neutrophils Relative 07/12/2019 87*    Immat GRANS % 07/12/2019 0     Lymphocytes Relative 07/12/2019 7*    Monocytes Relative 07/12/2019 6     Eosinophils Relative 07/12/2019 0     Basophils Relative 07/12/2019 0     Neutrophils Absolute 07/12/2019 11 77*    Immature Grans Absolute 07/12/2019 0 06     Lymphocytes Absolute 07/12/2019 0 94     Monocytes Absolute 07/12/2019 0 86     Eosinophils Absolute 07/12/2019 0 02     Basophils Absolute 07/12/2019 0 03     Sodium 07/12/2019 138     Potassium 07/12/2019 4 2     Chloride 07/12/2019 101     CO2 07/12/2019 29     ANION GAP 07/12/2019 8     BUN 07/12/2019 18     Creatinine 07/12/2019 0 72     Glucose 07/12/2019 92     Calcium 07/12/2019 8 8     eGFR 07/12/2019 77     Total Bilirubin 07/12/2019 0 40     Bilirubin, Direct 07/12/2019 0 09     Alkaline Phosphatase 07/12/2019 73     AST 07/12/2019 11     ALT 07/12/2019 8*    Total Protein 07/12/2019 7 4     Albumin 07/12/2019 3 7     Lipase 07/12/2019 153     LACTIC ACID 07/12/2019 0 8     Color, UA 07/12/2019 Yellow     Clarity, UA 07/12/2019 Clear     Specific Gravity, UA 07/12/2019 >=1 030     pH, UA 07/12/2019 5 0     Leukocytes, UA 07/12/2019 Trace*    Nitrite, UA 07/12/2019 Negative     Protein, UA 07/12/2019 Negative     Glucose, UA 07/12/2019 Negative     Ketones, UA 07/12/2019 Trace*    Urobilinogen, UA 07/12/2019 0 2     Bilirubin, UA 07/12/2019 Negative     Blood, UA 07/12/2019 Negative     RBC, UA 07/12/2019 0-1*    WBC, UA 07/12/2019 0-1*    Epithelial Cells 07/12/2019 Occasional     Bacteria, UA 07/12/2019 Occasional          Radiology Results:   No results found

## 2019-11-11 NOTE — PROGRESS NOTES
[facility] - Outpatient Follow-up Note  Barron Estrada 80 y o  female MRN: [id number]  Encounter: [id number]          ASSESSMENT AND PLAN:      1  Constipation, unspecified constipation type  Will utilize MiraLax 1 capsule daily  Patient will continue utilizing benefiber 2 teaspoons b i d   No plans for any repeat endoscopic evaluation  Patient will follow up in January 2020  Patient will call the office with any alarm symptoms  ______________________________________________________________________    SUBJECTIVE:   57-year-old female who is here for follow-up of chronic constant  Patient reports that right now she is utilizing MiraLax p r n  As well as benefit fiber twice a day and she is having irregular bowel movements  At the present time patient's biggest complaint is fatigue and tiredness in the afternoon every day  Patient reports no abdominal pain  Patient denies nausea or vomiting  Patient denies any rectal bleeding or melena  Patient's weight is going up  Patient has gained 5 pounds in the last 2 months  Patient reports her appetite is well  Patient is still concerned that she is not having a bowel movement every day  Patient's flexible sigmoidoscopy was reviewed with patient in detail  REVIEW OF SYSTEMS IS OTHERWISE NEGATIVE        Historical Information   Past Medical History:   Diagnosis Date    Bruit of left carotid artery     Cervicalgia     Constipation     CTS (carpal tunnel syndrome)     right    Hypercholesteremia     Nausea     Parkinson's disease ([address] )     Tremor      Past Surgical History:   Procedure Laterality Date    BREAST LUMPECTOMY Bilateral     LUMBAR FUSION      NEUROPLASTY / TRANSPOSITION MEDIAN NERVE AT CARPAL TUNNEL Right      Social History   Social History     Substance and Sexual Activity   Alcohol Use No     Social History     Substance and Sexual Activity   Drug Use No     Social History     Tobacco Use   Smoking Status Never Smoker   Smokeless Tobacco Never Used     Family History   Problem Relation Age of Onset    Cancer Mother     No Known Problems Father     Diabetes Sister     Heart disease Sister        Meds/Allergies       Current Outpatient Medications:     acetaminophen (TYLENOL) 325 mg tablet    Amantadine HCl ER (GOCOVRI) 137 MG CP24    aspirin 81 MG tablet    atorvastatin (LIPITOR) 10 mg tablet    bisacodyl (FLEET) 10 MG/30ML ENEM    carbidopa-levodopa (SINEMET)  mg per tablet    dibucaine (NUPERCAINAL) 1 % ointment    docusate sodium (COLACE) 100 mg capsule    glycerin adult 2 g suppository    hydrocortisone (ANUSOL-HC, PROCTOSOL HC,) 2 5 % rectal cream    mupirocin (BACTROBAN) 2 % ointment    ondansetron (ZOFRAN) 4 mg tablet    polyethylene glycol (MIRALAX) 17 g packet    rasagiline (AZILECT) 1 MG    Sennosides 25 MG TABS    No Known Allergies        Objective     Blood pressure 126/64, pulse 72, height 4' 10" (1 473 m), weight 44 2 kg (97 lb 6 4 oz)  Body mass index is 20 36 kg/m²  PHYSICAL EXAM:      General Appearance:   Alert, cooperative, no distress   HEENT:   Normocephalic, atraumatic, anicteric      Neck:  Supple, symmetrical, trachea midline   Lungs:   Clear to auscultation bilaterally; no rales, rhonchi or wheezing; respirations unlabored    Heart[de-identified]   Regular rate and rhythm; no murmur, rub, or gallop  Abdomen:   Soft, non-tender, non-distended; normal bowel sounds; no masses, no organomegaly    Genitalia:   Deferred    Rectal:   Deferred    Extremities:  No cyanosis, clubbing or edema    Pulses:  2+ and symmetric    Skin:  No jaundice, rashes, or lesions    Lymph nodes:  No palpable cervical lymphadenopathy        Lab Results:   No visits with results within 1 Day(s) from this visit     Latest known visit with results is:   Admission on 07/12/2019, Discharged on 07/12/2019   Component Date Value    WBC 07/12/2019 13 68*    RBC 07/12/2019 3 84     Hemoglobin 07/12/2019 12 4     Hematocrit 07/12/2019 37 7     MCV 07/12/2019 98     MCH 07/12/2019 32 3     MCHC 07/12/2019 32 9     RDW 07/12/2019 11 8     MPV 07/12/2019 9 6     Platelets 00/58/1013 207     nRBC 07/12/2019 0     Neutrophils Relative 07/12/2019 87*    Immat GRANS % 07/12/2019 0     Lymphocytes Relative 07/12/2019 7*    Monocytes Relative 07/12/2019 6     Eosinophils Relative 07/12/2019 0     Basophils Relative 07/12/2019 0     Neutrophils Absolute 07/12/2019 11 77*    Immature Grans Absolute 07/12/2019 0 06     Lymphocytes Absolute 07/12/2019 0 94     Monocytes Absolute 07/12/2019 0 86     Eosinophils Absolute 07/12/2019 0 02     Basophils Absolute 07/12/2019 0 03     Sodium 07/12/2019 138     Potassium 07/12/2019 4 2     Chloride 07/12/2019 101     CO2 07/12/2019 29     ANION GAP 07/12/2019 8     BUN 07/12/2019 18     Creatinine 07/12/2019 0 72     Glucose 07/12/2019 92     Calcium 07/12/2019 8 8     eGFR 07/12/2019 77     Total Bilirubin 07/12/2019 0 40     Bilirubin, Direct 07/12/2019 0 09     Alkaline Phosphatase 07/12/2019 73     AST 07/12/2019 11     ALT 07/12/2019 8*    Total Protein 07/12/2019 7 4     Albumin 07/12/2019 3 7     Lipase 07/12/2019 153     LACTIC ACID 07/12/2019 0 8     Color, UA 07/12/2019 Yellow     Clarity, UA 07/12/2019 Clear     Specific Gravity, UA 07/12/2019 >=1 030     pH, UA 07/12/2019 5 0     Leukocytes, UA 07/12/2019 Trace*    Nitrite, UA 07/12/2019 Negative     Protein, UA 07/12/2019 Negative     Glucose, UA 07/12/2019 Negative     Ketones, UA 07/12/2019 Trace*    Urobilinogen, UA 07/12/2019 0 2     Bilirubin, UA 07/12/2019 Negative     Blood, UA 07/12/2019 Negative     RBC, UA 07/12/2019 0-1*    WBC, UA 07/12/2019 0-1*    Epithelial Cells 07/12/2019 Occasional     Bacteria, UA 07/12/2019 Occasional          Radiology Results:   No results found

## 2019-12-12 DIAGNOSIS — E78.2 MIXED HYPERLIPIDEMIA: ICD-10-CM

## 2019-12-12 RX ORDER — ATORVASTATIN CALCIUM 10 MG/1
TABLET, FILM COATED ORAL
Qty: 90 TABLET | Refills: 1 | Status: SHIPPED | OUTPATIENT
Start: 2019-12-12 | End: 2020-07-09

## 2020-01-22 ENCOUNTER — TELEPHONE (OUTPATIENT)
Dept: NEUROLOGY | Facility: CLINIC | Age: 85
End: 2020-01-22

## 2020-01-27 ENCOUNTER — OFFICE VISIT (OUTPATIENT)
Dept: NEUROLOGY | Facility: CLINIC | Age: 85
End: 2020-01-27
Payer: MEDICARE

## 2020-01-27 VITALS
HEART RATE: 66 BPM | WEIGHT: 98 LBS | HEIGHT: 58 IN | SYSTOLIC BLOOD PRESSURE: 142 MMHG | DIASTOLIC BLOOD PRESSURE: 82 MMHG | BODY MASS INDEX: 20.57 KG/M2

## 2020-01-27 DIAGNOSIS — G60.9 IDIOPATHIC PERIPHERAL NEUROPATHY: ICD-10-CM

## 2020-01-27 DIAGNOSIS — G20 PARKINSON DISEASE (HCC): Primary | ICD-10-CM

## 2020-01-27 PROCEDURE — 99213 OFFICE O/P EST LOW 20 MIN: CPT | Performed by: PSYCHIATRY & NEUROLOGY

## 2020-01-27 NOTE — PROGRESS NOTES
Prabhu Gilmore is a 80 y o  female returns in follow-up today with history of peripheral neuropathy and Parkinson's disease    Assessment:  1  Parkinson's disease ([address] )    2  Idiopathic peripheral neuropathy        Plan:  Continue Sinemet Azilect and Gocovri  Follow-up 6 months    Discussion:  Overall Julissas Parkinson's disease remains under good control with present management including or adventitial movements  She does have peripheral neuropathy in infrequent neuropathic pain as well as issues with her balance  She is not interested in medication for neuropathic pain nor interested in physical therapy for balance  If she changes her mind she will notify me otherwise I will re-evaluate her in 6 months      Subjective:    HPI  Sherry Dunbar returns in follow-up today  She reports that since here last she has been doing well  She was not able to tolerate the higher dose of the Gocovri, but has noted improvement of her adventitial movements with 1 pill daily  She denies any end of dose phenomena with her Sinemet  She does feel that her balance is a bit off especially when she is walking or going up stairs  She has not had any falls  She does use her cane when she is walking outside  She reports infrequently she notes a sharp stabbing type pain typically in her feet but does not feel that is happening frequently enough to warrant treatment        Past Medical History:   Diagnosis Date    Bruit of left carotid artery     Cervicalgia     Constipation     CTS (carpal tunnel syndrome)     right    Hypercholesteremia     Nausea     [facility]'s disease [facility] [address]     Tremor        Family History:  Family History   Problem Relation Age of Onset    Cancer Mother     No Known Problems Father     Diabetes Sister     Heart disease Sister        Past Surgical History:  Past Surgical History:   Procedure Laterality Date    BREAST LUMPECTOMY Bilateral     LUMBAR FUSION      NEUROPLASTY / TRANSPOSITION MEDIAN NERVE AT CARPAL TUNNEL Right        Social History:   reports that she has never smoked  She has never used smokeless tobacco  She reports that she does not drink alcohol or use drugs  Allergies:  Patient has no known allergies  Current Outpatient Medications:     acetaminophen (TYLENOL) 325 mg tablet, Take 1 tablet by mouth daily as needed, Disp: , Rfl:     Amantadine HCl ER (GOCOVRI) 137 MG CP24, One p o  Daily, Disp: 30 capsule, Rfl: 5    aspirin 81 MG tablet, Take 1 tablet by mouth daily, Disp: , Rfl:     atorvastatin (LIPITOR) 10 mg tablet, TAKE ONE TABLET BY MOUTH ONE TIME DAILY , Disp: 90 tablet, Rfl: 1    carbidopa-levodopa (SINEMET)  mg per tablet, TAKE ONE TABLET BY MOUTH THREE TIMES DAILY , Disp: 90 tablet, Rfl: 4    dibucaine (NUPERCAINAL) 1 % ointment, Apply topically 3 (three) times a day (Patient taking differently: Apply topically as needed ), Disp: 30 g, Rfl: 0    docusate sodium (COLACE) 100 mg capsule, Take 1 capsule (100 mg total) by mouth 2 (two) times a day as needed for constipation, Disp: 60 capsule, Rfl: 0    polyethylene glycol (MIRALAX) 17 g packet, Take 17 g by mouth daily, Disp: , Rfl:     rasagiline (AZILECT) 1 MG, TAKE ONE TABLET BY MOUTH ONE TIME DAILY, Disp: 30 tablet, Rfl: 5    Sennosides 25 MG TABS, Take 1 tablet by mouth daily as needed  , Disp: , Rfl:     I have reviewed the past medical, social and family history, current medications, allergies, vitals, review of systems and updated this information as appropriate today     Objective:    Vitals:  Blood pressure 142/82, pulse 66, height 4' 10" (1 473 m), weight 44 5 kg (98 lb)  Physical Exam    Neurological Exam  GENERAL:  Well-developed well-nourished woman in no acute distress  HEENT/NECK: Head is atraumatic normocephalic, neck is supple  CARDIOVASCULAR:  A left  Carotid bruit is noted  NEUROLOGIC:  Mental Status: Awake and alert without aphasia  Cranial Nerves: Extraocular movements are full   Face is symmetrical  Motor:  No drift is noted on arm extension  Cogwheeling rigidity is noted bilaterally  Mild bradykinesia is noted with no rest tremor  Minimal fidgeting/choreoathetoid type movements are noted  Coordination:  Finger-to-nose testing is performed accurately  Romberg reveals mild sway with eyes closed  Gait reveals a mild increased base with decreased arm swing right greater than left  She was a bit up out of a chair with arms folded across her chest with the 1st attempt  There was posture instability with retropulsion testing            ROS:    Review of Systems   Constitutional: Negative  Negative for appetite change and fever  HENT: Positive for drooling and voice change  Negative for hearing loss, tinnitus and trouble swallowing  Eyes: Negative  Negative for photophobia and pain  Respiratory: Negative  Negative for shortness of breath  Cardiovascular: Negative  Negative for palpitations  Gastrointestinal: Negative  Negative for nausea and vomiting  Endocrine: Negative  Negative for cold intolerance and heat intolerance  Genitourinary: Negative  Negative for dysuria, frequency and urgency  Musculoskeletal: Positive for gait problem  Negative for back pain, myalgias and neck pain  Skin: Negative  Negative for rash  Neurological: Positive for dizziness, tremors and numbness  Negative for seizures, syncope, facial asymmetry, speech difficulty, weakness, light-headedness and headaches  Hematological: Negative  Does not bruise/bleed easily  Psychiatric/Behavioral: Positive for sleep disturbance  Negative for confusion and hallucinations

## 2020-03-09 ENCOUNTER — TELEPHONE (OUTPATIENT)
Dept: NEUROLOGY | Facility: CLINIC | Age: 85
End: 2020-03-09

## 2020-03-09 DIAGNOSIS — G20 PARKINSON'S DISEASE (HCC): ICD-10-CM

## 2020-03-09 NOTE — TELEPHONE ENCOUNTER
Suspect Lacie's issues with dizziness or more secondary to a sensory ataxia related to her peripheral neuropathy  When she was here in January she did not want to pursue physical therapy  She has been on a stable dose of her current amantadine and suspect it is less likely related to this  Have her stop the medication for a week  If she feels that her balance is better consider using the 68 5 mg dose

## 2020-03-12 DIAGNOSIS — G20 PARKINSON'S DISEASE (HCC): ICD-10-CM

## 2020-03-12 RX ORDER — RASAGILINE 1 MG/1
TABLET ORAL
Qty: 30 TABLET | Refills: 4 | Status: SHIPPED | OUTPATIENT
Start: 2020-03-12 | End: 2020-07-27 | Stop reason: SDUPTHER

## 2020-04-10 DIAGNOSIS — G20 PARKINSON'S DISEASE (HCC): ICD-10-CM

## 2020-04-10 RX ORDER — AMANTADINE 137 MG/1
CAPSULE, COATED PELLETS ORAL
Qty: 30 CAPSULE | Refills: 0 | Status: SHIPPED | OUTPATIENT
Start: 2020-04-10 | End: 2020-05-13

## 2020-05-13 DIAGNOSIS — G20 PARKINSON'S DISEASE (HCC): ICD-10-CM

## 2020-05-13 RX ORDER — AMANTADINE 137 MG/1
CAPSULE, COATED PELLETS ORAL
Qty: 30 CAPSULE | Refills: 0 | Status: SHIPPED | OUTPATIENT
Start: 2020-05-13 | End: 2020-06-30 | Stop reason: SDUPTHER

## 2020-06-05 ENCOUNTER — TELEPHONE (OUTPATIENT)
Dept: NEUROLOGY | Facility: CLINIC | Age: 85
End: 2020-06-05

## 2020-06-30 DIAGNOSIS — G20 PARKINSON'S DISEASE (HCC): ICD-10-CM

## 2020-06-30 NOTE — TELEPHONE ENCOUNTER
Medication refill check list    Correct patient? yes   Correct medication name, dose, and pill size? yes   Correct provider? yes   Last and Next appt  scheduled? Yes, last date 01/27/20 & next date 07/27/20   Right pharmacy listed? yes   Correct quantity for 30 or 90 days? yes   Is the patient out of refills? When was it last prescribed? Yes, last date 05/13/20   Directions match what the patient says they are taking?  yes   Enough refills? (none for controlled substances, 1 year for routine medications) yes

## 2020-07-09 DIAGNOSIS — E78.2 MIXED HYPERLIPIDEMIA: ICD-10-CM

## 2020-07-09 RX ORDER — ATORVASTATIN CALCIUM 10 MG/1
TABLET, FILM COATED ORAL
Qty: 30 TABLET | Refills: 0 | Status: SHIPPED | OUTPATIENT
Start: 2020-07-09 | End: 2020-09-04 | Stop reason: SDUPTHER

## 2020-07-27 ENCOUNTER — TELEPHONE (OUTPATIENT)
Dept: NEUROLOGY | Facility: CLINIC | Age: 85
End: 2020-07-27

## 2020-07-27 ENCOUNTER — OFFICE VISIT (OUTPATIENT)
Dept: NEUROLOGY | Facility: CLINIC | Age: 85
End: 2020-07-27
Payer: MEDICARE

## 2020-07-27 VITALS
SYSTOLIC BLOOD PRESSURE: 140 MMHG | DIASTOLIC BLOOD PRESSURE: 80 MMHG | WEIGHT: 93 LBS | BODY MASS INDEX: 19.52 KG/M2 | HEART RATE: 86 BPM | HEIGHT: 58 IN

## 2020-07-27 DIAGNOSIS — G20 PARKINSON'S DISEASE (HCC): Primary | ICD-10-CM

## 2020-07-27 DIAGNOSIS — G60.9 IDIOPATHIC PERIPHERAL NEUROPATHY: ICD-10-CM

## 2020-07-27 DIAGNOSIS — G20 PARKINSON DISEASE (HCC): ICD-10-CM

## 2020-07-27 PROCEDURE — 3008F BODY MASS INDEX DOCD: CPT | Performed by: PSYCHIATRY & NEUROLOGY

## 2020-07-27 PROCEDURE — 99214 OFFICE O/P EST MOD 30 MIN: CPT | Performed by: PSYCHIATRY & NEUROLOGY

## 2020-07-27 PROCEDURE — 4040F PNEUMOC VAC/ADMIN/RCVD: CPT | Performed by: PSYCHIATRY & NEUROLOGY

## 2020-07-27 PROCEDURE — 1160F RVW MEDS BY RX/DR IN RCRD: CPT | Performed by: PSYCHIATRY & NEUROLOGY

## 2020-07-27 PROCEDURE — 1036F TOBACCO NON-USER: CPT | Performed by: PSYCHIATRY & NEUROLOGY

## 2020-07-27 RX ORDER — RASAGILINE 1 MG/1
1 TABLET ORAL DAILY
Qty: 30 TABLET | Refills: 4 | Status: SHIPPED | OUTPATIENT
Start: 2020-07-27 | End: 2020-12-21

## 2020-07-27 NOTE — PROGRESS NOTES
Parker Osborn is a 80 y o  female returns with history of Parkinson's disease and neuropathy    Assessment:  1  Parkinson's disease ([address] )    2  Idiopathic peripheral neuropathy        Plan:  Increase Sinemet to q i d  Discontinue amantadine  Continue Azilect  Physical therapy  Follow-up 3 months    Discussion:  Za Pandey reports increased tremors in the latter part of the day, while exam this morning appears that her Parkinson's is under good control  Have recommended increasing her dose of Sinemet to 4 times daily, proximally every 4 hours  She will continue with Azilect  She feels that the extended release amantadine is causing dizziness even though she is taking half the recommended dose and is not taking it every night  She may discontinue this and have recommended initiating physical therapy for her balance which she is open to doing now  She anticipates seeing Dr Daniel Ochoa for her back issues  She reports that she does not drive and wonder if she should be able to drive  We did discuss the fit to drive evaluation through occupational therapy  I will see her back in follow-up in 3 months      Subjective:    HPI  Za Pandey returns in follow-up today accompanied by her daughter  She reports that her Parkinson's disease seems to be not well controlled  She states that she notes more tremulousness in the afternoon evening  She states she does fairly well in the morning  She continues to report feelings of dizziness and lightheadedness on the extended release amantadine  She is taking half of the recommended dose and admits that she does not take it every night  She does ambulate with a straight cane and states that she fell awhile back but not recently  She does report some back pain issues and had surgery by Dr Daniel Ochoa in the past   She anticipates seeing him again in the future        Past Medical History:   Diagnosis Date    Bruit of left carotid artery     Cervicalgia     Constipation     CTS (carpal tunnel syndrome)     right    Hypercholesteremia     Nausea     Parkinson's disease ([address] )     Tremor        Family History:  Family History   Problem Relation Age of Onset    Cancer Mother     No Known Problems Father     Diabetes Sister     Heart disease Sister        Past Surgical History:  Past Surgical History:   Procedure Laterality Date    BREAST LUMPECTOMY Bilateral     LUMBAR FUSION      NEUROPLASTY / TRANSPOSITION MEDIAN NERVE AT CARPAL TUNNEL Right        Social History:   reports that she has never smoked  She has never used smokeless tobacco  She reports that she does not drink alcohol or use drugs  Allergies:  Patient has no known allergies  Current Outpatient Medications:     acetaminophen (TYLENOL) 325 mg tablet, Take 1 tablet by mouth daily as needed, Disp: , Rfl:     Amantadine HCl ER (Gocovri) 137 MG CP24, One p o  Q h s , Disp: 30 capsule, Rfl: 5    aspirin 81 MG tablet, Take 1 tablet by mouth daily, Disp: , Rfl:     atorvastatin (LIPITOR) 10 mg tablet, TAKE ONE TABLET BY MOUTH ONE TIME DAILY , Disp: 30 tablet, Rfl: 0    carbidopa-levodopa (SINEMET)  mg per tablet, Take 1 tablet by mouth 3 (three) times a day, Disp: 90 tablet, Rfl: 5    dibucaine (NUPERCAINAL) 1 % ointment, Apply topically 3 (three) times a day (Patient taking differently: Apply topically as needed ), Disp: 30 g, Rfl: 0    polyethylene glycol (MIRALAX) 17 g packet, Take 17 g by mouth daily, Disp: , Rfl:     rasagiline (AZILECT) 1 MG, TAKE ONE TABLET BY MOUTH ONE TIME DAILY , Disp: 30 tablet, Rfl: 4    Sennosides 25 MG TABS, Take 1 tablet by mouth daily as needed  , Disp: , Rfl:     I have reviewed the past medical, social and family history, current medications, allergies, vitals, review of systems and updated this information as appropriate today     Objective:    Vitals:  Blood pressure 140/80, pulse 86, height 4' 9 75" (1 467 m), weight 42 2 kg (93 lb)      Physical Exam    Neurological Exam  GENERAL:  Well-developed well-nourished woman in no acute distress  HEENT/NECK: Head is atraumatic normocephalic, neck is supple  NEUROLOGIC:  Mental Status: Awake and alert without aphasia  Cranial Nerves: Extraocular movements are full  Face is symmetrical  Motor:  No drift is noted on arm extension  No rest tremors noted  Minimal cogwheeling rigidity is noted in the upper extremities bilaterally  Coordination:  Gait reveals an increased base, stable with a straight cane            ROS:    Review of Systems   Constitutional: Positive for fatigue  Negative for appetite change and fever  HENT: Positive for voice change  Negative for hearing loss, tinnitus and trouble swallowing  Eyes: Positive for visual disturbance (left eye)  Negative for photophobia and pain  Respiratory: Negative  Negative for shortness of breath  Cardiovascular: Negative  Negative for palpitations  Gastrointestinal: Negative  Negative for nausea and vomiting  Endocrine: Negative  Negative for cold intolerance  Genitourinary: Positive for enuresis  Negative for dysuria, frequency and urgency  Musculoskeletal: Positive for back pain and gait problem  Negative for myalgias and neck pain  Skin: Negative  Negative for rash  Neurological: Positive for tremors (legs) and numbness  Negative for dizziness, seizures, syncope, facial asymmetry, speech difficulty, weakness, light-headedness and headaches  Hematological: Negative  Does not bruise/bleed easily  Psychiatric/Behavioral: Positive for sleep disturbance  Negative for confusion and hallucinations

## 2020-07-27 NOTE — TELEPHONE ENCOUNTER
Pt's dtr called and states that pt has Parkinson's disease  Saw Dr Augusto Javed today who suggested pt be seen by our physician who specialized in Parkinson's  She would like to schedule f/u appt w/ one our movement doctor  Bob or Ruby, can you guys check if you can accommodate this pt to either Dr Ro or Osvaldo's schedule if they're agreeable?          [phone] ok to leave detailed message

## 2020-07-28 ENCOUNTER — TELEPHONE (OUTPATIENT)
Dept: NEUROLOGY | Facility: CLINIC | Age: 85
End: 2020-07-28

## 2020-07-28 NOTE — TELEPHONE ENCOUNTER
LMOM to schedule pt with Dr Loreli Denver she has a sooner slot that I held for Aug 6th at 8:05  Will wait c/b  If unable will schedule soonest available

## 2020-07-28 NOTE — TELEPHONE ENCOUNTER
Pt's dtr Bernadette Dinh called and advised of the below  States the 0805 is too early   Preferred appt after 11 am

## 2020-08-09 ENCOUNTER — HOSPITAL ENCOUNTER (EMERGENCY)
Facility: HOSPITAL | Age: 85
Discharge: HOME | End: 2020-08-09
Attending: EMERGENCY MEDICINE | Admitting: EMERGENCY MEDICINE
Payer: MEDICARE

## 2020-08-09 ENCOUNTER — APPOINTMENT (EMERGENCY)
Dept: CT IMAGING | Facility: HOSPITAL | Age: 85
End: 2020-08-09
Payer: MEDICARE

## 2020-08-09 VITALS
WEIGHT: 95.24 LBS | BODY MASS INDEX: 19.99 KG/M2 | HEIGHT: 58 IN | RESPIRATION RATE: 18 BRPM | SYSTOLIC BLOOD PRESSURE: 102 MMHG | DIASTOLIC BLOOD PRESSURE: 56 MMHG | HEART RATE: 63 BPM | OXYGEN SATURATION: 98 % | TEMPERATURE: 98.4 F

## 2020-08-09 DIAGNOSIS — R42 LIGHTHEADEDNESS: ICD-10-CM

## 2020-08-09 DIAGNOSIS — G20 PARKINSON'S DISEASE (HCC): Primary | ICD-10-CM

## 2020-08-09 LAB
ANION GAP SERPL CALCULATED.3IONS-SCNC: 9 MMOL/L (ref 4–13)
ATRIAL RATE: 70 BPM
BACTERIA UR QL AUTO: ABNORMAL /HPF
BASOPHILS # BLD AUTO: 0.02 THOUSANDS/ΜL (ref 0–0.1)
BASOPHILS NFR BLD AUTO: 0 % (ref 0–1)
BILIRUB UR QL STRIP: NEGATIVE
BUN SERPL-MCNC: 19 MG/DL (ref 5–25)
CALCIUM SERPL-MCNC: 8.9 MG/DL (ref 8.3–10.1)
CHLORIDE SERPL-SCNC: 104 MMOL/L (ref 100–108)
CLARITY UR: CLEAR
CO2 SERPL-SCNC: 28 MMOL/L (ref 21–32)
COLOR UR: YELLOW
CREAT SERPL-MCNC: 0.84 MG/DL (ref 0.6–1.3)
EOSINOPHIL # BLD AUTO: 0.04 THOUSAND/ΜL (ref 0–0.61)
EOSINOPHIL NFR BLD AUTO: 1 % (ref 0–6)
ERYTHROCYTE [DISTWIDTH] IN BLOOD BY AUTOMATED COUNT: 12.2 % (ref 11.6–15.1)
GFR SERPL CREATININE-BSD FRML MDRD: 64 ML/MIN/1.73SQ M
GLUCOSE SERPL-MCNC: 120 MG/DL (ref 65–140)
GLUCOSE UR STRIP-MCNC: NEGATIVE MG/DL
HCT VFR BLD AUTO: 40 % (ref 34.8–46.1)
HGB BLD-MCNC: 13.4 G/DL (ref 11.5–15.4)
HGB UR QL STRIP.AUTO: NEGATIVE
IMM GRANULOCYTES # BLD AUTO: 0.02 THOUSAND/UL (ref 0–0.2)
IMM GRANULOCYTES NFR BLD AUTO: 0 % (ref 0–2)
KETONES UR STRIP-MCNC: NEGATIVE MG/DL
LEUKOCYTE ESTERASE UR QL STRIP: ABNORMAL
LYMPHOCYTES # BLD AUTO: 1.11 THOUSANDS/ΜL (ref 0.6–4.47)
LYMPHOCYTES NFR BLD AUTO: 21 % (ref 14–44)
MCH RBC QN AUTO: 32.7 PG (ref 26.8–34.3)
MCHC RBC AUTO-ENTMCNC: 33.5 G/DL (ref 31.4–37.4)
MCV RBC AUTO: 98 FL (ref 82–98)
MONOCYTES # BLD AUTO: 0.52 THOUSAND/ΜL (ref 0.17–1.22)
MONOCYTES NFR BLD AUTO: 10 % (ref 4–12)
NEUTROPHILS # BLD AUTO: 3.6 THOUSANDS/ΜL (ref 1.85–7.62)
NEUTS SEG NFR BLD AUTO: 68 % (ref 43–75)
NITRITE UR QL STRIP: NEGATIVE
NON-SQ EPI CELLS URNS QL MICRO: ABNORMAL /HPF
NRBC BLD AUTO-RTO: 0 /100 WBCS
P AXIS: 72 DEGREES
PH UR STRIP.AUTO: 6 [PH]
PLATELET # BLD AUTO: 232 THOUSANDS/UL (ref 149–390)
PMV BLD AUTO: 9.7 FL (ref 8.9–12.7)
POTASSIUM SERPL-SCNC: 4 MMOL/L (ref 3.5–5.3)
PR INTERVAL: 132 MS
PROT UR STRIP-MCNC: NEGATIVE MG/DL
QRS AXIS: 48 DEGREES
QRSD INTERVAL: 82 MS
QT INTERVAL: 364 MS
QTC INTERVAL: 393 MS
RBC # BLD AUTO: 4.1 MILLION/UL (ref 3.81–5.12)
RBC #/AREA URNS AUTO: ABNORMAL /HPF
SARS-COV-2 RNA RESP QL NAA+PROBE: NEGATIVE
SODIUM SERPL-SCNC: 141 MMOL/L (ref 136–145)
SP GR UR STRIP.AUTO: 1.02 (ref 1–1.03)
T WAVE AXIS: 71 DEGREES
TROPONIN I SERPL-MCNC: <0.02 NG/ML
UROBILINOGEN UR QL STRIP.AUTO: 0.2 E.U./DL
VENTRICULAR RATE: 70 BPM
WBC # BLD AUTO: 5.31 THOUSAND/UL (ref 4.31–10.16)
WBC #/AREA URNS AUTO: ABNORMAL /HPF

## 2020-08-09 PROCEDURE — 36415 COLL VENOUS BLD VENIPUNCTURE: CPT | Performed by: PHYSICIAN ASSISTANT

## 2020-08-09 PROCEDURE — 93010 ELECTROCARDIOGRAM REPORT: CPT | Performed by: INTERNAL MEDICINE

## 2020-08-09 PROCEDURE — 99284 EMERGENCY DEPT VISIT MOD MDM: CPT

## 2020-08-09 PROCEDURE — G1004 CDSM NDSC: HCPCS

## 2020-08-09 PROCEDURE — 99285 EMERGENCY DEPT VISIT HI MDM: CPT | Performed by: PHYSICIAN ASSISTANT

## 2020-08-09 PROCEDURE — 70450 CT HEAD/BRAIN W/O DYE: CPT

## 2020-08-09 PROCEDURE — 84484 ASSAY OF TROPONIN QUANT: CPT | Performed by: PHYSICIAN ASSISTANT

## 2020-08-09 PROCEDURE — 81001 URINALYSIS AUTO W/SCOPE: CPT | Performed by: PHYSICIAN ASSISTANT

## 2020-08-09 PROCEDURE — 80048 BASIC METABOLIC PNL TOTAL CA: CPT | Performed by: PHYSICIAN ASSISTANT

## 2020-08-09 PROCEDURE — 87635 SARS-COV-2 COVID-19 AMP PRB: CPT | Performed by: PHYSICIAN ASSISTANT

## 2020-08-09 PROCEDURE — 85025 COMPLETE CBC W/AUTO DIFF WBC: CPT | Performed by: PHYSICIAN ASSISTANT

## 2020-08-09 PROCEDURE — 93005 ELECTROCARDIOGRAM TRACING: CPT

## 2020-08-09 RX ORDER — MECLIZINE HYDROCHLORIDE 25 MG/1
25 TABLET ORAL 3 TIMES DAILY PRN
Qty: 30 TABLET | Refills: 0 | Status: SHIPPED | OUTPATIENT
Start: 2020-08-09 | End: 2020-08-09 | Stop reason: SDUPTHER

## 2020-08-09 RX ORDER — MECLIZINE HYDROCHLORIDE 25 MG/1
25 TABLET ORAL 3 TIMES DAILY PRN
Qty: 30 TABLET | Refills: 0 | Status: SHIPPED | OUTPATIENT
Start: 2020-08-09 | End: 2020-09-04 | Stop reason: ALTCHOICE

## 2020-08-09 NOTE — ED PROVIDER NOTES
History  Chief Complaint   Patient presents with    Shaking     pt reports shaking, "my head doesn't feel right at time," and dizziness  pt states "I don't know if I've had the virus or if its my parkinson's "     Headache    Dizziness    Weight Loss     pt reports weight loss for about 1 year      80-year-old female with past medical history significant for Parkinson's disease, hypercholesterolemia presents to the emergency department with chief complaint of tremors and dizziness  Patient reports symptoms have been ongoing for over 2 months but she feels they are worse over the past 2 weeks  Location of symptoms reported as multiple sites including the head and arms  Quality is reported as increasing tremulousness and sensation of feeling dizzy  Severity reported as mild to moderate  Associated symptoms:  Denies nausea or vomiting  Denies diarrhea  Denies chest pain or shortness of breath  Denies fevers  Positive for dizziness  Positive for tremors  Denies rash  Modifying factors:  Patient reports that she has been taking her medications from her neurologist for her Parkinson's but seems to think her symptoms are getting worse despite the medications  Context:  Denies any recent fall injury or trauma  Reports she recently had her Parkinson's medications adjusted but she does not feel any improvement  Has an appointment with a Parkinson's specialist in November  This concerned that her tremors and dizziness may be related to a COVID infection  Denies any recent sick contacts or travel outside the country    Reviewed past visits via epic:  Patient last seen in the emergency department by 07/12/2019 for evaluation treatment of constipation      History provided by:  Patient and spouse   used: No    Headache   Associated symptoms: dizziness and fatigue    Associated symptoms: no abdominal pain, no back pain, no congestion, no cough, no diarrhea, no drainage, no ear pain, no eye pain, no fever, no hearing loss, no myalgias, no nausea, no neck pain, no neck stiffness, no numbness, no photophobia, no seizures, no sinus pressure, no sore throat, no vomiting and no weakness    Dizziness   Associated symptoms: no blood in stool, no chest pain, no diarrhea, no headaches, no hearing loss, no nausea, no palpitations, no shortness of breath, no tinnitus, no vomiting and no weakness        Prior to Admission Medications   Prescriptions Last Dose Informant Patient Reported? Taking?    Sennosides 25 MG TABS  Self Yes No   Sig: Take 1 tablet by mouth daily as needed     acetaminophen (TYLENOL) 325 mg tablet  Self Yes No   Sig: Take 1 tablet by mouth daily as needed   aspirin 81 MG tablet  Self Yes No   Sig: Take 1 tablet by mouth daily   atorvastatin (LIPITOR) 10 mg tablet   No No   Sig: TAKE ONE TABLET BY MOUTH ONE TIME DAILY    carbidopa-levodopa (SINEMET)  mg per tablet   No No   Sig: Take 1 tablet by mouth 4 (four) times a day   dibucaine (NUPERCAINAL) 1 % ointment  Self No No   Sig: Apply topically 3 (three) times a day   Patient taking differently: Apply topically as needed    polyethylene glycol (MIRALAX) 17 g packet  Self Yes No   Sig: Take 17 g by mouth daily   rasagiline (AZILECT) 1 MG   No No   Sig: Take 1 tablet (1 mg total) by mouth daily      Facility-Administered Medications: None       Past Medical History:   Diagnosis Date    Bruit of left carotid artery     Cervicalgia     Constipation     CTS (carpal tunnel syndrome)     right    Hypercholesteremia     Nausea     Parkinson's disease (HCC)     Tremor        Past Surgical History:   Procedure Laterality Date    BREAST LUMPECTOMY Bilateral     LUMBAR FUSION      NEUROPLASTY / TRANSPOSITION MEDIAN NERVE AT CARPAL TUNNEL Right        Family History   Problem Relation Age of Onset    Cancer Mother     No Known Problems Father     Diabetes Sister     Heart disease Sister      I have reviewed and agree with the history as documented  E-Cigarette/Vaping     E-Cigarette/Vaping Substances     Social History     Tobacco Use    Smoking status: Never Smoker    Smokeless tobacco: Never Used   Substance Use Topics    Alcohol use: No    Drug use: No       Review of Systems   Constitutional: Positive for fatigue and unexpected weight change  Negative for activity change, appetite change, chills, diaphoresis and fever  HENT: Negative for congestion, dental problem, drooling, ear discharge, ear pain, facial swelling, hearing loss, mouth sores, nosebleeds, postnasal drip, rhinorrhea, sinus pressure, sinus pain, sneezing, sore throat, tinnitus, trouble swallowing and voice change  Eyes: Negative for photophobia, pain, discharge, redness, itching and visual disturbance  Respiratory: Negative for apnea, cough, choking, chest tightness, shortness of breath, wheezing and stridor  Cardiovascular: Negative for chest pain, palpitations and leg swelling  Gastrointestinal: Negative for abdominal distention, abdominal pain, anal bleeding, blood in stool, constipation, diarrhea, nausea, rectal pain and vomiting  Endocrine: Negative for cold intolerance, heat intolerance, polydipsia, polyphagia and polyuria  Genitourinary: Negative for decreased urine volume, difficulty urinating, dyspareunia, dysuria, enuresis, flank pain, frequency, hematuria, menstrual problem, pelvic pain, urgency, vaginal bleeding, vaginal discharge and vaginal pain  Musculoskeletal: Negative for arthralgias, back pain, gait problem, joint swelling, myalgias, neck pain and neck stiffness  Skin: Negative for color change, pallor, rash and wound  Allergic/Immunologic: Negative for environmental allergies, food allergies and immunocompromised state  Neurological: Positive for dizziness and tremors  Negative for seizures, syncope, facial asymmetry, speech difficulty, weakness, light-headedness, numbness and headaches  Hematological: Negative for adenopathy  Does not bruise/bleed easily  Psychiatric/Behavioral: Negative for agitation, confusion, hallucinations, self-injury and suicidal ideas  The patient is not hyperactive  All other systems reviewed and are negative  Physical Exam  Physical Exam  Vitals signs and nursing note reviewed  Constitutional:       General: She is not in acute distress  Appearance: Normal appearance  She is well-developed  She is not diaphoretic  Comments: /56   Pulse 63   Temp 98 4 °F (36 9 °C)   Resp 18   Ht 4' 10" (1 473 m)   Wt 43 2 kg (95 lb 3 8 oz)   SpO2 98%   BMI 19 90 kg/m²      HENT:      Head: Normocephalic and atraumatic  Right Ear: Tympanic membrane, ear canal and external ear normal  There is no impacted cerumen  Left Ear: Tympanic membrane, ear canal and external ear normal  There is no impacted cerumen  Nose: Nose normal  No congestion or rhinorrhea  Mouth/Throat:      Mouth: Mucous membranes are moist       Pharynx: No oropharyngeal exudate or posterior oropharyngeal erythema  Eyes:      General: No scleral icterus  Right eye: No discharge  Left eye: No discharge  Extraocular Movements: Extraocular movements intact  Conjunctiva/sclera: Conjunctivae normal    Neck:      Musculoskeletal: Normal range of motion and neck supple  Vascular: No JVD  Trachea: No tracheal deviation  Cardiovascular:      Rate and Rhythm: Normal rate and regular rhythm  Pulmonary:      Effort: Pulmonary effort is normal  No respiratory distress  Breath sounds: Normal breath sounds  No stridor  No wheezing, rhonchi or rales  Chest:      Chest wall: No tenderness  Abdominal:      General: There is no distension  Palpations: Abdomen is soft  There is no mass  Tenderness: There is no abdominal tenderness  There is no right CVA tenderness, left CVA tenderness, guarding or rebound  Hernia: No hernia is present     Musculoskeletal: Normal range of motion  General: No swelling, tenderness, deformity or signs of injury  Right lower leg: No edema  Left lower leg: No edema  Lymphadenopathy:      Cervical: No cervical adenopathy  Skin:     General: Skin is warm and dry  Capillary Refill: Capillary refill takes less than 2 seconds  Coloration: Skin is not jaundiced or pale  Findings: No bruising, erythema, lesion or rash  Neurological:      General: No focal deficit present  Mental Status: She is alert and oriented to person, place, and time  Mental status is at baseline  Cranial Nerves: No cranial nerve deficit  Sensory: No sensory deficit  Motor: No weakness or abnormal muscle tone  Coordination: Coordination normal       Gait: Gait normal       Deep Tendon Reflexes: Reflexes normal       Comments: Tremors of bilateral hands noted  Psychiatric:         Mood and Affect: Mood normal          Behavior: Behavior normal          Thought Content:  Thought content normal          Judgment: Judgment normal          Vital Signs  ED Triage Vitals [08/09/20 0836]   Temperature Pulse Respirations Blood Pressure SpO2   98 4 °F (36 9 °C) 91 16 142/67 98 %      Temp src Heart Rate Source Patient Position - Orthostatic VS BP Location FiO2 (%)   -- Monitor Lying Right arm --      Pain Score       No Pain           Vitals:    08/09/20 0836 08/09/20 0930 08/09/20 1030   BP: 142/67 122/61 102/56   Pulse: 91 67 63   Patient Position - Orthostatic VS: Lying           Visual Acuity  Visual Acuity      Most Recent Value   L Pupil Size (mm)  3   R Pupil Size (mm)  3          ED Medications  Medications - No data to display    Diagnostic Studies  Results Reviewed     Procedure Component Value Units Date/Time    Urine Microscopic [[id number]]  (Abnormal) Collected:  08/09/20 1124    Lab Status:  Final result Specimen:  Urine, Clean Catch Updated:  08/09/20 1151     RBC, UA None Seen /hpf      WBC, UA 2-4 /hpf Epithelial Cells Occasional /hpf      Bacteria, UA Occasional /hpf     UA w Reflex to Microscopic w Reflex to Culture [[id number]]  (Abnormal) Collected:  08/09/20 1124    Lab Status:  Final result Specimen:  Urine, Clean Catch Updated:  08/09/20 1131     Color, UA Yellow     Clarity, UA Clear     Specific [place], UA 1 020     pH, UA 6 0     Leukocytes, UA Small     Nitrite, UA Negative     Protein, UA Negative mg/dl      Glucose, UA Negative mg/dl      Ketones, UA Negative mg/dl      Urobilinogen, UA 0 2 E U /dl      Bilirubin, UA Negative     Blood, UA Negative    Novel Coronavirus (Covid-19),PCR [facility] [[id number]]  (Normal) Collected:  08/09/20 0911    Lab Status:  Final result Specimen:  Nares from Nose Updated:  08/09/20 1015     SARS-CoV-2 Negative    Narrative: The specimen collection materials, transport medium, and/or testing methodology utilized in the production of these test results have been proven to be reliable in a limited validation with an abbreviated program under the Emergency Utilization Authorization provided by the FDA  Testing reported as "Presumptive positive" will be confirmed with secondary testing with a reference laboratory to ensure result accuracy  Clinical caution and judgement should be used with the interpretation of these results with consideration of the clinical impression and other laboratory testing  Testing reported as "Positive" or "Negative" has been proven to be accurate according to standard laboratory validation requirements  All testing is performed with control materials showing appropriate reactivity at standard intervals        Troponin I [[id number]]  (Normal) Collected:  08/09/20 0911    Lab Status:  Final result Specimen:  Blood from Arm, Right Updated:  08/09/20 0941     Troponin I <0 02 ng/mL     Basic metabolic panel [[id number]] Collected:  08/09/20 0911    Lab Status:  Final result Specimen:  Blood from Arm, Right Updated:  08/09/20 0932     Sodium 141 mmol/L      Potassium 4 0 mmol/L      Chloride 104 mmol/L      CO2 28 mmol/L      ANION GAP 9 mmol/L      BUN 19 mg/dL      Creatinine 0 84 mg/dL      Glucose 120 mg/dL      Calcium 8 9 mg/dL      eGFR 64 ml/min/1 73sq m     Narrative:       [place] guidelines for Chronic Kidney Disease (CKD):     Stage 1 with normal or high GFR (GFR > 90 mL/min/1 73 square meters)    Stage 2 Mild CKD (GFR = 60-89 mL/min/1 73 square meters)    Stage 3A Moderate CKD (GFR = 45-59 mL/min/1 73 square meters)    Stage 3B Moderate CKD (GFR = 30-44 mL/min/1 73 square meters)    Stage 4 Severe CKD (GFR = 15-29 mL/min/1 73 square meters)    Stage 5 End Stage CKD (GFR <15 mL/min/1 73 square meters)  Note: GFR calculation is accurate only with a steady state creatinine    CBC and differential [[id number]] Collected:  08/09/20 0911    Lab Status:  Final result Specimen:  Blood from Arm, Right Updated:  08/09/20 0917     WBC 5 31 Thousand/uL      RBC 4 10 Million/uL      Hemoglobin 13 4 g/dL      Hematocrit 40 0 %      MCV 98 fL      MCH 32 7 pg      MCHC 33 5 g/dL      RDW 12 2 %      MPV 9 7 fL      Platelets 761 Thousands/uL      nRBC 0 /100 WBCs      Neutrophils Relative 68 %      Immat GRANS % 0 %      Lymphocytes Relative 21 %      Monocytes Relative 10 %      Eosinophils Relative 1 %      Basophils Relative 0 %      Neutrophils Absolute 3 60 Thousands/µL      Immature Grans Absolute 0 02 Thousand/uL      Lymphocytes Absolute 1 11 Thousands/µL      Monocytes Absolute 0 52 Thousand/µL      Eosinophils Absolute 0 04 Thousand/µL      Basophils Absolute 0 02 Thousands/µL                  CT head without contrast   Final Result by Ladan Mae MD (34/93 6715)      1  No acute intracranial abnormality  2   Areas of calcification within the basal ganglia and infratentorial subcortical regions  This can be secondary to metabolic disturbances involving calcium        The study was marked in EPIC for significant notification  Workstation performed: [id number]                    Procedures  Procedures         ED Course       US AUDIT      Most Recent Value   Initial Alcohol Screen: US AUDIT-C    1  How often do you have a drink containing alcohol?  0 Filed at: 08/09/2020 0838   2  How many drinks containing alcohol do you have on a typical day you are drinking? 0 Filed at: 08/09/2020 0838   3a  Male UNDER 65: How often do you have five or more drinks on one occasion? 0 Filed at: 08/09/2020 0838   3b  FEMALE Any Age, or MALE 65+: How often do you have 4 or more drinks on one occassion? 0 Filed at: 08/09/2020 0838   Audit-C Score  0 Filed at: 08/09/2020 7041            HEART Risk Score      Most Recent Value   Heart Score Risk Calculator   History  0 Filed at: 08/09/2020 1623   ECG  0 Filed at: 08/09/2020 1623   Age  2 Filed at: 08/09/2020 1623   Risk Factors  1 Filed at: 08/09/2020 1623   Troponin  0 Filed at: 08/09/2020 1623   HEART Score  3 Filed at: 08/09/2020 1623            [place]/DAST-10      Most Recent Value   How many times in the past year have you    Used an illegal drug or used a prescription medication for non-medical reasons? Never Filed at: 08/09/2020 2268                                MDM  Number of Diagnoses or Management Options  Lightheadedness: new and requires workup  Parkinson's disease ([facility] [address] ): new and requires workup  Diagnosis management comments: ddx includes but is not limited to peripheral vertigo, BPV, labyrinthitis, meniere disease, vestibule neuronitis, acoustic neuroma, cerebellar hemorrhage, vertebrobasilar insufficiency, tumor, MS, CVA, TIA, cardiac arrhythmia, anemia, [place], ACS, hypoglycemia, vasovagal syndrome, electrolyte abnormality, dehydration, infection  Plan workup including ekg to evaluate for arrhythmias, check labs, ct head               Amount and/or Complexity of Data Reviewed  Clinical lab tests: ordered and reviewed  Tests in the radiology section of CPT®: ordered and reviewed  Tests in the medicine section of CPT®: ordered  Discussion of test results with the performing providers: yes  Obtain history from someone other than the patient: yes (Daughter)  Review and summarize past medical records: yes  Independent visualization of images, tracings, or specimens: yes    Risk of Complications, Morbidity, and/or Mortality  General comments: CT head images independently visualized interpreted by me  Radiology report reviewed:PARENCHYMA: Decreased attenuation is noted in periventricular and subcortical white matter demonstrating an appearance that is statistically most likely to represent mild microangiopathic change  No CT signs of acute infarction   No intracranial mass, mass effect or midline shift   No acute parenchymal hemorrhage    Again noted dense calcification within the basal ganglia near the globus pallidi   Additionally there are small scattered areas of   calcification present within the cerebellum and the region of the gray-white junctions   The globus pallidus calcification was seen on the prior MRI, unclear whether the infratentorial calcifications were present  VENTRICLES AND EXTRA-AXIAL SPACES:  Normal for the patient's age  VISUALIZED ORBITS AND PARANASAL SINUSES:  Unremarkable  CALVARIUM AND EXTRACRANIAL SOFT TISSUES:  Normal      Lab results reviewed  Urinalysis demonstrates negative nitrites and negative blood  CBC demonstrates normal white blood cell count of 5 3, hemoglobin of 13 4 hematocrit of 40 0 are normal   No anemia  Basic metabolic panel was reviewed:  BUN of 19 and creatinine 0 84 normal   No renal failure  Troponin normal less than 0 02  Coronavirus test was negative  I reviewed all test results with patient and daughter at bedside  I discussed I suspect her tremor symptoms are likely secondary to her Parkinson's disease    I discussed continue recurrent Parkinson's medications and follow up with her Parkinson's specialist for further evaluation and titration of medications  Discussed diagnosis of dizziness  This may be secondary to her Parkinson's or to the findings noted on her CT scan  Discussed trial of meclizine to see if this helps improve her symptoms  Discussed follow up with primary care physician and neurologist in 3-5 days for recheck and further evaluation of symptoms  Reviewed reasons to return to ed  Patient verbalized understanding of diagnosis and agreement with discharge plan of care as well as understanding of reasons to return to ed  I have reasonably determine that electronically prescribing a controlled substance would be impractical for the patient to obtain the controlled substance prescribed by electronic prescription or would cause an untimely delay resulting in an adverse impact on the patient's medical condition   Patient Progress  Patient progress: stable        Disposition  Final diagnoses:   Parkinson's disease ([facility] [address] )   Lightheadedness     Time reflects when diagnosis was documented in both MDM as applicable and the Disposition within this note     Time User Action Codes Description Comment    8/9/2020 11:40 AM Watson Forman Add [G20] Parkinson's disease ([facility] [address] )     8/9/2020 11:40 AM Tucker Forman Add [R42] Lightheadedness       ED Disposition     ED Disposition Condition Date/Time Comment    Discharge Stable Sun Aug 9, 2020 11:40 AM Marcie Stern discharge to home/self care              Follow-up Information     Follow up With Specialties Details Why Contact Info Additional Information    Srinivasan Joy MD Family Medicine Call in 2 days for further evaluation of symptoms [address] [facility],[address]  [address] Emergency Department Emergency Medicine Go to  If symptoms worsen [address] [facility] [address] [phone] [place] ED, [address] [place], [place], [address] [facility], MD Neurology Call in 1 week for further evaluation of symptoms 3 Parkinson's [address]  [phone]             Discharge Medication List as of 8/9/2020 11:41 AM      START taking these medications    Details   meclizine (ANTIVERT) 25 mg tablet Take 1 tablet (25 mg total) by mouth 3 (three) times a day as needed for dizziness, Starting Sun 8/9/2020, Print         CONTINUE these medications which have NOT CHANGED    Details   acetaminophen (TYLENOL) 325 mg tablet Take 1 tablet by mouth daily as needed, Historical Med      aspirin 81 MG tablet Take 1 tablet by mouth daily, Historical Med      atorvastatin (LIPITOR) 10 mg tablet TAKE ONE TABLET BY MOUTH ONE TIME DAILY , Normal      carbidopa-levodopa (SINEMET)  mg per tablet Take 1 tablet by mouth 4 (four) times a day, Starting Mon 7/27/2020, Normal      dibucaine (NUPERCAINAL) 1 % ointment Apply topically 3 (three) times a day, Starting Tue 7/16/2019, Normal      polyethylene glycol (MIRALAX) 17 g packet Take 17 g by mouth daily, Historical Med      rasagiline (AZILECT) 1 MG Take 1 tablet (1 mg total) by mouth daily, Starting Mon 7/27/2020, Normal      Sennosides 25 MG TABS Take 1 tablet by mouth daily as needed  , Historical Med           No discharge procedures on file      PDMP Review     None          ED Provider  Electronically Signed by           Arabella Palma PA-C  08/09/20 6012

## 2020-08-10 ENCOUNTER — TELEPHONE (OUTPATIENT)
Dept: NEUROLOGY | Facility: CLINIC | Age: 85
End: 2020-08-10

## 2020-08-10 NOTE — TELEPHONE ENCOUNTER
eileen from [facility] called to confirm that pt no longer needs delivery of gocovri  per office note from 7/27- She feels that the extended release amantadine is causing dizziness even though she is taking half the recommended dose and is not taking it every night    She may discontinue this and have recommended initiating physical therapy for her balance which she is open to doing now     Omari Herr made aware that this was dc'd

## 2020-08-11 ENCOUNTER — EVALUATION (OUTPATIENT)
Dept: PHYSICAL THERAPY | Facility: CLINIC | Age: 85
End: 2020-08-11
Payer: MEDICARE

## 2020-08-11 DIAGNOSIS — G60.9 IDIOPATHIC PERIPHERAL NEUROPATHY: ICD-10-CM

## 2020-08-11 DIAGNOSIS — G20 PARKINSON'S DISEASE (HCC): Primary | ICD-10-CM

## 2020-08-11 PROCEDURE — 97163 PT EVAL HIGH COMPLEX 45 MIN: CPT | Performed by: PHYSICAL THERAPIST

## 2020-08-11 NOTE — PROGRESS NOTES
PT Evaluation     Today's date: 2020  Patient name: Roberto Peace  : 1935  MRN: [id number]  Referring provider: Malcolm Garcia MD  Dx:   Encounter Diagnosis     ICD-10-CM    1  Parkinson's disease ([facility] [address] )  [address] Ambulatory referral to Physical Therapy   2  Idiopathic peripheral neuropathy  G60 9 Ambulatory referral to Physical Therapy       Start Time: 908  Stop Time: 1000  Total time in clinic (min): 52 minutes    Assessment  Assessment details: Roberto Peace is a 80 y o  female who presents with decreased strength, decreased ROM, ambulatory dysfunction, postural  dysfunction and impaired balance  Due to these impairments, Patient has difficulty performing a/iadls, recreational activities and engaging in social activities  Patient's clinical presentation is consistent with their referring diagnosis of Parkinson's disease  Patient would benefit from skilled physical therapy to address their aforementioned impairments, improve their level of function and to improve their overall quality of life  Impairments: abnormal gait, abnormal or restricted ROM, abnormal movement, activity intolerance, impaired balance, impaired physical strength, lacks appropriate home exercise program, safety issue, poor posture  and poor body mechanics  Understanding of Dx/Px/POC: excellent   Prognosis: good    Goals  Short Term Goals: to be achieved in 4 weeks  1) Patient to be independent with basic HEP  2) Increase LE strength by 1/2 MMT grade in all deficient planes  3) Increase ambulatory tolerance by 5 minutes  Long Term Goals: to be achieved by discharge  1) FOTO equal to or greater than 59   2) Patient to be independent with comprehensive HEP  3) Increase LE strength to 5/5 MMT grade in all planes to improve A/IADLS  4) Increase ambulatory tolerance to 10 minutes      Plan  Patient would benefit from: skilled PT  Planned modality interventions: biofeedback, cryotherapy, hydrotherapy, TENS, unattended electrical stimulation and low level laser therapy  Planned therapy interventions: activity modification, ADL retraining, balance, balance/weight bearing training, behavior modification, body mechanics training, functional ROM exercises, gait training, home exercise program, IADL retraining, joint mobilization, manual therapy, massage, neuromuscular re-education, patient education, strengthening, stretching, therapeutic activities, therapeutic exercise and transfer training  Frequency: 2-3x week  Duration in weeks: 12  Plan of Care beginning date: 8/11/2020  Plan of Care expiration date: 11/3/2020  Treatment plan discussed with: patient and family        Subjective Evaluation    History of Present Illness  Mechanism of injury: Patient reports worsening balance with 2 falls over the past year  Patient went to ED on 8/9 d/t to feeling terrible, blood work, urinalysis and radiographs negative  Prescribed Meclizine d/t worsening dizziness over the past 5-6 months      "I really don't know why I'm here " Patient does acknowledge having balance dysfunction, stating that she feels unsteady walking through doors, when standing up, walking over uneven ground and negotiating steps  Patient states that wearing her mask makes it more difficult for her to see the ground where she needs to flex her head more to see     Pain  No pain reported    Treatments  No previous or current treatments  Patient Goals  Patient goal: improved balance, improved quality of gait, increased strength        Objective     Active Range of Motion   Cervical/Thoracic Spine       Thoracic    Flexion:  Restriction level: maximal  Extension:  Restriction level: maximal    Passive Range of Motion   Cervical/Thoracic Spine     Thoracic     Flexion (degrees):  Restriction level: moderate  Extension (degrees):  Restriction level: moderate    Strength/Myotome Testing     Left Hip   Planes of Motion   Flexion: 4    Right Hip   Planes of Motion   Flexion: 4    Left Knee Flexion: 4+  Extension: 4+    Right Knee   Flexion: 5  Extension: 5    Left Ankle/Foot   Dorsiflexion: 4+    Right Ankle/Foot   Dorsiflexion: 4+    Ambulation   Weight-Bearing Status   Weight-Bearing Status (Left): full weight bearing   Weight-Bearing Status (Right): full weight-bearing    Assistive device used: single point cane    Ambulation: Level Surfaces   Ambulation with assistive device: independent    Functional Assessment        Comments  Timed and Up and Go (TUG): 21 96 sec, Mod I ambulation with SPC and sit to/from stand with b/l arm rests  Five Times Sit to Stand Test: 25 45 sec, SPC and and u/l arm rest, good eccentric control  Gait speed (over 10 ft): 1 46 sec/ft  Balance (sec):    Feet apart, EO: 30    Feet together, EO: 30, increased tremors    Feet apart, EC: 30, increased tremors    Feet together, EC: 30, increased tremors, requires close supervision    SLS: unable bilaterally    Tandem stance: 5 sec             Precautions: Parkinson's, fall risk, lumbar fusion      Manuals 8/11                                                                Neuro Re-Ed             Tandem stance             Sidestepping             Retro walk             Cone taps                                                    Ther Ex             Nustep             Standing hip abd, ext with TB                                                                                           Ther Activity             Squats             FSU             LSU                                                                 Gait Training                                       Modalities

## 2020-08-14 ENCOUNTER — TELEPHONE (OUTPATIENT)
Dept: NEUROLOGY | Facility: CLINIC | Age: 85
End: 2020-08-14

## 2020-08-14 NOTE — TELEPHONE ENCOUNTER
FYI-  PT's daughter Mandie Zarate (on CC) called if her mother could be seen earlier by Dr Benny Alberto for her PD consult  I could not find any slot  Made her aware that I put her on the [address]  Please contact Mandie Zarate at [phone] to change appt  Thank you

## 2020-08-17 ENCOUNTER — OFFICE VISIT (OUTPATIENT)
Dept: PHYSICAL THERAPY | Facility: CLINIC | Age: 85
End: 2020-08-17
Payer: MEDICARE

## 2020-08-17 DIAGNOSIS — G60.9 IDIOPATHIC PERIPHERAL NEUROPATHY: ICD-10-CM

## 2020-08-17 DIAGNOSIS — G20 PARKINSON'S DISEASE (HCC): Primary | ICD-10-CM

## 2020-08-17 PROCEDURE — 97112 NEUROMUSCULAR REEDUCATION: CPT | Performed by: PHYSICAL THERAPIST

## 2020-08-17 NOTE — PROGRESS NOTES
Daily Note     Today's date: 2020  Patient name: Francisco Elizondo  : 1935  MRN: [id number]  Referring provider: Dale Zacarias MD  Dx:   Encounter Diagnosis     ICD-10-CM    1  Parkinson's disease ([facility] [address] )  G20    2  Idiopathic peripheral neuropathy  G60 9        Start Time: 930  Stop Time: 1020  Total time in clinic (min): 50 minutes    Subjective: Patient reports that her tremors are very bad today and that she is having difficulty standing  Objective: See treatment diary below      Assessment: Tolerated treatment fair  Patient demonstrated fatigue post treatment and would benefit from continued PT  Patient demonstrated good static and dynamic balance, requiring CGA but did not have any near falls or significant LOB  Patient with difficulty ambulating with reciprocal arm swing  Discussed possible transfer to neuro clinic for Parkinson's specialist, but Patient would like to remain in [address] for now  Plan: Continue per plan of care  Progress treatment as tolerated  Precautions: Parkinson's, fall risk, lumbar fusion      Manuals                                                                Neuro Re-Ed             Tandem stance             Sidestepping  3 laps           Retro walk             Cone taps  20x           4-square stepping (CW, CCW)  2x ea             Stepping strategy with tierra pads  5x b/l           Walking with reciprocal arm swing  3 laps           Seated thoracic rotation with SPC             Ther Ex             Nustep             Standing hip abd, ext with TB                                                                                           Ther Activity             Squats             FSU             LSU                                                                 Gait Training                                       Modalities

## 2020-08-20 ENCOUNTER — OFFICE VISIT (OUTPATIENT)
Dept: PHYSICAL THERAPY | Facility: CLINIC | Age: 85
End: 2020-08-20
Payer: MEDICARE

## 2020-08-20 DIAGNOSIS — G20 PARKINSON'S DISEASE (HCC): Primary | ICD-10-CM

## 2020-08-20 DIAGNOSIS — G60.9 IDIOPATHIC PERIPHERAL NEUROPATHY: ICD-10-CM

## 2020-08-20 PROCEDURE — 97112 NEUROMUSCULAR REEDUCATION: CPT | Performed by: PHYSICAL THERAPIST

## 2020-08-20 PROCEDURE — 97530 THERAPEUTIC ACTIVITIES: CPT | Performed by: PHYSICAL THERAPIST

## 2020-08-20 NOTE — PROGRESS NOTES
Daily Note     Today's date: 2020  Patient name: Parker Osborn  : 1935  MRN: [id number]  Referring provider: Jovita Garland MD  Dx:   Encounter Diagnosis     ICD-10-CM    1  Parkinson's disease ([facility] [address] )  G20    2  Idiopathic peripheral neuropathy  G60 9        Start Time: 1531  Stop Time: 1620  Total time in clinic (min): 49 minutes    Subjective: Patient reports that she feels unsteady on her feet and tired d/t being later in the afternoon  Objective: See treatment diary below      Assessment: Tolerated treatment well  Patient demonstrated fatigue post treatment and would benefit from continued PT  Patient demonstrated good eccentric control descending 8" step  Patient with improved stepping strategy, minor loss of balance when stepping backwards  Plan: Continue per plan of care  Progress treatment as tolerated  Precautions: Parkinson's, fall risk, lumbar fusion      Manuals                                                               Neuro Re-Ed             Tandem stance             Sidestepping  3 laps 3 laps          Retro walk             Cone taps  20x 20x          4-square stepping (CW, CCW)  2x ea  3x ea            Stepping strategy with tierra pads  5x b/l 5x b/l          Walking with reciprocal arm swing  3 laps           Seated thoracic rotation with SPC             Ther Ex             Nustep             Standing hip abd, ext with TB   NV          Rows   N                                                                           Ther Activity             Sit to stand on foam   15x          FSU             LSU   10x 8"                                                              Gait Training                                       Modalities

## 2020-08-24 ENCOUNTER — OFFICE VISIT (OUTPATIENT)
Dept: PHYSICAL THERAPY | Facility: CLINIC | Age: 85
End: 2020-08-24
Payer: MEDICARE

## 2020-08-24 DIAGNOSIS — G60.9 IDIOPATHIC PERIPHERAL NEUROPATHY: ICD-10-CM

## 2020-08-24 DIAGNOSIS — G20 PARKINSON'S DISEASE (HCC): Primary | ICD-10-CM

## 2020-08-24 PROCEDURE — 97110 THERAPEUTIC EXERCISES: CPT | Performed by: PHYSICAL THERAPIST

## 2020-08-24 PROCEDURE — 97530 THERAPEUTIC ACTIVITIES: CPT | Performed by: PHYSICAL THERAPIST

## 2020-08-24 NOTE — PROGRESS NOTES
Daily Note     Today's date: 2020  Patient name: Rock Toscano  : 1935  MRN: [id number]  Referring provider: Rene James MD  Dx:   Encounter Diagnosis     ICD-10-CM    1  Parkinson's disease ([facility] [address] )  G20    2  Idiopathic peripheral neuropathy  G60 9        Start Time: 934  Stop Time: 1020  Total time in clinic (min): 46 minutes    Subjective: Patient reports that she would like to make her next treatment session her last visit  Objective: See treatment diary below      Assessment: Tolerated treatment fair  Patient demonstrated fatigue post treatment and would benefit from continued PT  Patient reported increased LE muscular soreness at end of session  Plan: Continue per plan of care  Potential discharge next visit  Progress treatment as tolerated  Precautions: Parkinson's, fall risk, lumbar fusion      Manuals                                                              Neuro Re-Ed             Tandem stance             Sidestepping  3 laps 3 laps          Retro walk             Cone taps  20x 20x          4-square stepping (CW, CCW)  2x ea  3x ea  Stepping strategy with tierra pads  5x b/l 5x b/l          Walking with reciprocal arm swing  3 laps           Seated thoracic rotation with SPC             Ther Ex             Nustep             Standing hip abd, ext with TB   NV 15x 2 5# ea           Rows   NV 2x10 RTB         LAQ    20x 2 5#                                                             Ther Activity             Sit to stand on foam   15x 15x no foam         FSU             LSU   10x 8" 2x10 8"                                                             Gait Training                                       Modalities

## 2020-08-27 ENCOUNTER — APPOINTMENT (OUTPATIENT)
Dept: PHYSICAL THERAPY | Facility: CLINIC | Age: 85
End: 2020-08-27
Payer: MEDICARE

## 2020-08-27 NOTE — PROGRESS NOTES
PT Discharge    Today's date: 2020  Patient name: Meghan London  : 1935  MRN: [id number]  Referring provider: Maxwell Loera MD  Dx:   Encounter Diagnosis     ICD-10-CM    1  Parkinson's disease ([facility] [address] )  G20    2  Idiopathic peripheral neuropathy  G60 9                   Assessment  Assessment details: Since beginning physical therapy, Petra Mancia has attended a total number of 5 visits and has maintained good compliance with established POC  Patient has made minimal improvements to date d/t severity of Parkinson's symptoms and shortened physical therapy stay  Patient is reporting improved ability to perform {Functional Limitations:25608}  Patient is independent with comprehensive HEP  Patient has been instructed to contact PT if she begins to notice a decline in function or has any questions or concerns  Patient will be discharged at this time  Patient is in agreement with plan  Impairments: abnormal gait, abnormal or restricted ROM, abnormal movement, activity intolerance, impaired balance, impaired physical strength, lacks appropriate home exercise program, safety issue, poor posture  and poor body mechanics  Understanding of Dx/Px/POC: excellent   Prognosis: good    Goals  Short Term Goals: to be achieved in 4 weeks  1) Patient to be independent with basic HEP  MET  2) Increase LE strength by 1/2 MMT grade in all deficient planes  NOT MET  3) Increase ambulatory tolerance by 5 minutes  MET    Long Term Goals: to be achieved by discharge  1) FOTO equal to or greater than 59  PROGRESSED, BUT NOT MET  2) Patient to be independent with comprehensive HEP  MET  3) Increase LE strength to 5/5 MMT grade in all planes to improve A/IADLS  PROGRESSED, BUT NOT MET  4) Increase ambulatory tolerance to 10 minutes   MET    Plan  Patient would benefit from: skilled PT  Planned modality interventions: biofeedback, cryotherapy, hydrotherapy, TENS, unattended electrical stimulation and low level laser therapy  Planned therapy interventions: activity modification, ADL retraining, balance, balance/weight bearing training, behavior modification, body mechanics training, functional ROM exercises, gait training, home exercise program, IADL retraining, joint mobilization, manual therapy, massage, neuromuscular re-education, patient education, strengthening, stretching, therapeutic activities, therapeutic exercise and transfer training  Frequency: 2-3x week    Duration in visits: 1  Plan of Care beginning date: 8/27/2020  Plan of Care expiration date: 8/27/2020  Treatment plan discussed with: patient and family        Subjective    Objective     Active Range of Motion   Cervical/Thoracic Spine       Thoracic    Flexion:  Restriction level: maximal  Extension:  Restriction level: maximal    Passive Range of Motion   Cervical/Thoracic Spine     Thoracic     Flexion (degrees):  Restriction level: moderate  Extension (degrees):  Restriction level: moderate    Strength/Myotome Testing     Left Hip   Planes of Motion   Flexion: 4    Right Hip   Planes of Motion   Flexion: 4    Left Knee   Flexion: 4+  Extension: 4+    Right Knee   Flexion: 5  Extension: 5    Left Ankle/Foot   Dorsiflexion: 4+    Right Ankle/Foot   Dorsiflexion: 4+    Ambulation   Weight-Bearing Status   Weight-Bearing Status (Left): full weight bearing   Weight-Bearing Status (Right): full weight-bearing    Assistive device used: single point cane    Ambulation: Level Surfaces   Ambulation with assistive device: independent    Functional Assessment        Comments  INITIAL EVALUATION (8/11/20)  Timed and Up and Go (TUG): 21 96 sec, Mod I ambulation with SPC and sit to/from stand with b/l arm rests  Five Times Sit to Stand Test: 25 45 sec, SPC and and u/l arm rest, good eccentric control  Gait speed (over 10 ft): 1 46 sec/ft  Balance (sec):    Feet apart, EO: 30    Feet together, EO: 30, increased tremors    Feet apart, EC: 30, increased tremors    Feet together, EC: 30, increased tremors, requires close supervision    SLS: unable bilaterally    Tandem stance: 5 sec    REEVALUATION (8/27/20): Timed and Up and Go (TUG): 21 96 sec, Mod I ambulation with SPC and sit to/from stand with b/l arm rests  Five Times Sit to Stand Test:   Gait speed (over 10 ft):   Balance (sec):    Feet apart, EO:     Feet together, EO: 30, increased tremors    Feet apart, EC: 30, increased tremors    Feet together, EC: 30, increased tremors, requires close supervision    SLS: unable bilaterally    Tandem stance: 5 sec             Precautions: Parkinson's, fall risk, lumbar fusion      Manuals 8/11 8/17 8/20 8/24 8/27                                                            Neuro Re-Ed             Tandem stance             Sidestepping  3 laps 3 laps          Retro walk             Cone taps  20x 20x          4-square stepping (CW, CCW)  2x ea  3x ea  Stepping strategy with tierra pads  5x b/l 5x b/l          Walking with reciprocal arm swing  3 laps           Seated thoracic rotation with SPC             Ther Ex             Nustep             Standing hip abd, ext with TB   NV 15x 2 5# ea           Rows   NV 2x10 RTB         LAQ    20x 2 5#                                                             Ther Activity             Sit to stand on foam   15x 15x no foam         FSU             LSU   10x 8" 2x10 8"                                                             Gait Training                                       Modalities

## 2020-08-27 NOTE — PROGRESS NOTES
Chanel Suazo has attended a total of 4 physical therapy appointments to date  Patient was last treated on 8/24/20 and has cancelled all remaining appointments scheduled  Goals, objective and subjective information unable to be updated at this time   Patient will be discharged from physical therapy per request

## 2020-08-31 ENCOUNTER — APPOINTMENT (OUTPATIENT)
Dept: PHYSICAL THERAPY | Facility: CLINIC | Age: 85
End: 2020-08-31
Payer: MEDICARE

## 2020-09-01 DIAGNOSIS — E78.2 MIXED HYPERLIPIDEMIA: Primary | ICD-10-CM

## 2020-09-01 DIAGNOSIS — G20 PARKINSON'S DISEASE (HCC): ICD-10-CM

## 2020-09-03 ENCOUNTER — APPOINTMENT (OUTPATIENT)
Dept: LAB | Facility: CLINIC | Age: 85
End: 2020-09-03
Payer: MEDICARE

## 2020-09-03 DIAGNOSIS — R63.4 WEIGHT LOSS: ICD-10-CM

## 2020-09-03 DIAGNOSIS — G20 PARKINSON'S DISEASE (HCC): ICD-10-CM

## 2020-09-03 DIAGNOSIS — E78.2 MIXED HYPERLIPIDEMIA: ICD-10-CM

## 2020-09-03 LAB
ALBUMIN SERPL BCP-MCNC: 4 G/DL (ref 3.5–5)
ALP SERPL-CCNC: 60 U/L (ref 46–116)
ALT SERPL W P-5'-P-CCNC: 15 U/L (ref 12–78)
ANION GAP SERPL CALCULATED.3IONS-SCNC: 4 MMOL/L (ref 4–13)
AST SERPL W P-5'-P-CCNC: 8 U/L (ref 5–45)
BASOPHILS # BLD AUTO: 0.03 THOUSANDS/ΜL (ref 0–0.1)
BASOPHILS NFR BLD AUTO: 1 % (ref 0–1)
BILIRUB SERPL-MCNC: 0.52 MG/DL (ref 0.2–1)
BUN SERPL-MCNC: 18 MG/DL (ref 5–25)
CALCIUM SERPL-MCNC: 9.1 MG/DL (ref 8.3–10.1)
CHLORIDE SERPL-SCNC: 106 MMOL/L (ref 100–108)
CHOLEST SERPL-MCNC: 171 MG/DL (ref 50–200)
CO2 SERPL-SCNC: 29 MMOL/L (ref 21–32)
CREAT SERPL-MCNC: 0.74 MG/DL (ref 0.6–1.3)
EOSINOPHIL # BLD AUTO: 0.04 THOUSAND/ΜL (ref 0–0.61)
EOSINOPHIL NFR BLD AUTO: 1 % (ref 0–6)
ERYTHROCYTE [DISTWIDTH] IN BLOOD BY AUTOMATED COUNT: 12.3 % (ref 11.6–15.1)
GFR SERPL CREATININE-BSD FRML MDRD: 74 ML/MIN/1.73SQ M
GLUCOSE P FAST SERPL-MCNC: 97 MG/DL (ref 65–99)
HCT VFR BLD AUTO: 40.1 % (ref 34.8–46.1)
HDLC SERPL-MCNC: 56 MG/DL
HGB BLD-MCNC: 12.5 G/DL (ref 11.5–15.4)
IMM GRANULOCYTES # BLD AUTO: 0.02 THOUSAND/UL (ref 0–0.2)
IMM GRANULOCYTES NFR BLD AUTO: 0 % (ref 0–2)
LDLC SERPL CALC-MCNC: 97 MG/DL (ref 0–100)
LYMPHOCYTES # BLD AUTO: 0.81 THOUSANDS/ΜL (ref 0.6–4.47)
LYMPHOCYTES NFR BLD AUTO: 16 % (ref 14–44)
MCH RBC QN AUTO: 32.1 PG (ref 26.8–34.3)
MCHC RBC AUTO-ENTMCNC: 31.2 G/DL (ref 31.4–37.4)
MCV RBC AUTO: 103 FL (ref 82–98)
MONOCYTES # BLD AUTO: 0.49 THOUSAND/ΜL (ref 0.17–1.22)
MONOCYTES NFR BLD AUTO: 10 % (ref 4–12)
NEUTROPHILS # BLD AUTO: 3.61 THOUSANDS/ΜL (ref 1.85–7.62)
NEUTS SEG NFR BLD AUTO: 72 % (ref 43–75)
NRBC BLD AUTO-RTO: 0 /100 WBCS
PLATELET # BLD AUTO: 210 THOUSANDS/UL (ref 149–390)
PMV BLD AUTO: 10.1 FL (ref 8.9–12.7)
POTASSIUM SERPL-SCNC: 4.2 MMOL/L (ref 3.5–5.3)
PROT SERPL-MCNC: 7.3 G/DL (ref 6.4–8.2)
RBC # BLD AUTO: 3.9 MILLION/UL (ref 3.81–5.12)
SODIUM SERPL-SCNC: 139 MMOL/L (ref 136–145)
TRIGL SERPL-MCNC: 90 MG/DL
WBC # BLD AUTO: 5 THOUSAND/UL (ref 4.31–10.16)

## 2020-09-03 PROCEDURE — 84443 ASSAY THYROID STIM HORMONE: CPT

## 2020-09-03 PROCEDURE — 80061 LIPID PANEL: CPT

## 2020-09-03 PROCEDURE — 85025 COMPLETE CBC W/AUTO DIFF WBC: CPT

## 2020-09-03 PROCEDURE — 36415 COLL VENOUS BLD VENIPUNCTURE: CPT

## 2020-09-03 PROCEDURE — 80053 COMPREHEN METABOLIC PANEL: CPT

## 2020-09-04 ENCOUNTER — OFFICE VISIT (OUTPATIENT)
Dept: FAMILY MEDICINE CLINIC | Facility: CLINIC | Age: 85
End: 2020-09-04
Payer: MEDICARE

## 2020-09-04 VITALS
DIASTOLIC BLOOD PRESSURE: 72 MMHG | TEMPERATURE: 98.3 F | HEIGHT: 58 IN | OXYGEN SATURATION: 96 % | WEIGHT: 93 LBS | SYSTOLIC BLOOD PRESSURE: 118 MMHG | HEART RATE: 68 BPM | BODY MASS INDEX: 19.52 KG/M2

## 2020-09-04 DIAGNOSIS — G56.02 CARPAL TUNNEL SYNDROME OF LEFT WRIST: ICD-10-CM

## 2020-09-04 DIAGNOSIS — G20 PARKINSON'S DISEASE (HCC): Primary | ICD-10-CM

## 2020-09-04 DIAGNOSIS — R63.4 WEIGHT LOSS: ICD-10-CM

## 2020-09-04 DIAGNOSIS — Z00.00 MEDICARE ANNUAL WELLNESS VISIT, SUBSEQUENT: ICD-10-CM

## 2020-09-04 DIAGNOSIS — Z23 NEED FOR PNEUMOCOCCAL VACCINE: ICD-10-CM

## 2020-09-04 DIAGNOSIS — E78.2 MIXED HYPERLIPIDEMIA: ICD-10-CM

## 2020-09-04 DIAGNOSIS — L98.9 SKIN LESION OF CHEEK: ICD-10-CM

## 2020-09-04 LAB — TSH SERPL DL<=0.05 MIU/L-ACNC: 0.85 UIU/ML (ref 0.36–3.74)

## 2020-09-04 PROCEDURE — 99214 OFFICE O/P EST MOD 30 MIN: CPT | Performed by: FAMILY MEDICINE

## 2020-09-04 PROCEDURE — G0439 PPPS, SUBSEQ VISIT: HCPCS | Performed by: FAMILY MEDICINE

## 2020-09-04 PROCEDURE — 90670 PCV13 VACCINE IM: CPT

## 2020-09-04 PROCEDURE — G0009 ADMIN PNEUMOCOCCAL VACCINE: HCPCS

## 2020-09-04 RX ORDER — ATORVASTATIN CALCIUM 10 MG/1
10 TABLET, FILM COATED ORAL DAILY
Qty: 90 TABLET | Refills: 3 | Status: SHIPPED | OUTPATIENT
Start: 2020-09-04 | End: 2021-06-09 | Stop reason: ALTCHOICE

## 2020-09-04 NOTE — PROGRESS NOTES
Assessment and Plan:     Problem List Items Addressed This Visit        Nervous and Auditory    Parkinson's disease ([facility] [address] ) - Primary     Follow-up with neurology          Carpal tunnel syndrome of left wrist     Continue bracing  Offered referral to Orthopedics, patient declined at this time  Other    Medicare annual wellness visit, subsequent    Weight loss     Will add TSH to recent blood work  Advised her to track caloric intake         Relevant Orders    TSH, 3rd generation    Mixed hyperlipidemia     Lipids well controlled  Continue current regimen         Relevant Medications    atorvastatin (LIPITOR) 10 mg tablet      Other Visit Diagnoses     Skin lesion of cheek        Relevant Orders    Ambulatory referral to Dermatology    Need for pneumococcal vaccine               Preventive health issues were discussed with patient, and age appropriate screening tests were ordered as noted in patient's After Visit Summary  Personalized health advice and appropriate referrals for health education or preventive services given if needed, as noted in patient's After Visit Summary  History of Present Illness:     Patient presents for Welcome to Medicare visit       Patient Care Team:  Maryana Baltazar MD as PCP - General (Family Medicine)  MD Minnie Damian MD Roma Ruck, PA-C as Physician Assistant (Physician Assistant)     Problem List:     Patient Active Problem List   Diagnosis    Parkinson's disease [facility])    Idiopathic peripheral neuropathy    Carpal tunnel syndrome of left wrist    Medicare annual wellness visit, subsequent    Weight loss    Mixed hyperlipidemia      Past Medical and Surgical History:     Past Medical History:   Diagnosis Date    Bruit of left carotid artery     Cervicalgia     Constipation     CTS (carpal tunnel syndrome)     right    Hypercholesteremia     Nausea     [facility]     Tremor      Past Surgical History:   Procedure Laterality Date    BREAST LUMPECTOMY Bilateral     LUMBAR FUSION      NEUROPLASTY / TRANSPOSITION MEDIAN NERVE AT CARPAL TUNNEL Right       Family History:     Family History   Problem Relation Age of Onset   Zuly Leisure Cancer Mother     No Known Problems Father     Diabetes Sister     Heart disease Sister       Social History:        Social History     Socioeconomic History    Marital status:       Spouse name: Not on file    Number of children: Not on file    Years of education: Not on file    Highest education level: Not on file   Occupational History    Not on file   Social Needs    Financial resource strain: Not on file    Food insecurity     Worry: Not on file     Inability: Not on file    Transportation needs     Medical: Not on file     Non-medical: Not on file   Tobacco Use    Smoking status: Never Smoker    Smokeless tobacco: Never Used   Substance and Sexual Activity    Alcohol use: No    Drug use: No    Sexual activity: Not Currently   Lifestyle    Physical activity     Days per week: Not on file     Minutes per session: Not on file    Stress: Not on file   Relationships    Social connections     Talks on phone: Not on file     Gets together: Not on file     Attends Buddhism service: Not on file     Active member of club or organization: Not on file     Attends meetings of clubs or organizations: Not on file     Relationship status: Not on file    Intimate partner violence     Fear of current or ex partner: Not on file     Emotionally abused: Not on file     Physically abused: Not on file     Forced sexual activity: Not on file   Other Topics Concern    Not on file   Social History Narrative    Not on file      Medications and Allergies:     Current Outpatient Medications   Medication Sig Dispense Refill    acetaminophen (TYLENOL) 325 mg tablet Take 1 tablet by mouth daily as needed      aspirin 81 MG tablet Take 1 tablet by mouth daily      atorvastatin (LIPITOR) 10 mg tablet Take 1 tablet (10 mg total) by mouth daily 90 tablet 3    carbidopa-levodopa (SINEMET)  mg per tablet Take 1 tablet by mouth 4 (four) times a day 120 tablet 5    dibucaine (NUPERCAINAL) 1 % ointment Apply topically 3 (three) times a day (Patient taking differently: Apply topically as needed ) 30 g 0    polyethylene glycol (MIRALAX) 17 g packet Take 17 g by mouth daily      rasagiline (AZILECT) 1 MG Take 1 tablet (1 mg total) by mouth daily 30 tablet 4    Sennosides 25 MG TABS Take 1 tablet by mouth daily as needed         No current facility-administered medications for this visit  No Known Allergies   Immunizations:     Immunization History   Administered Date(s) Administered    Influenza TIV (IM) 10/29/2002    Pneumococcal Polysaccharide PPV23 12/19/2006      Health Maintenance: There are no preventive care reminders to display for this patient  Topic Date Due    DTaP,Tdap,and Td Vaccines (1 - Tdap) 05/25/1956      Medicare Screening Tests and Risk Assessments:     Daniel Harley is here for her Subsequent Wellness visit  Last Medicare Wellness visit information reviewed, patient interviewed, no change since last AWV  Health Risk Assessment:   Patient rates overall health as poor  Patient feels that their physical health rating is slightly worse  Eyesight was rated as slightly worse  Hearing was rated as same  Patient feels that their emotional and mental health rating is slightly worse  Pain experienced in the last 7 days has been some  Patient's pain rating has been 5/10  Patient states that she has experienced no weight loss or gain in last 6 months  Depression Screening:   PHQ-2 Score: 0      Fall Risk Screening:    In the past year, patient has experienced: history of falling in past year    Number of falls: 2 or more  Injured during fall?: No    Feels unsteady when standing or walking?: Yes    Worried about falling?: Yes      Urinary Incontinence Screening:   Patient has not leaked urine accidently in the last six months  Home Safety:  Patient does not have trouble with stairs inside or outside of their home  Patient has working smoke alarms and has working carbon monoxide detector  Home safety hazards include: none  Nutrition:   Current diet is Regular  Medications:   Patient is not currently taking any over-the-counter supplements  Patient is able to manage medications  Activities of Daily Living (ADLs)/Instrumental Activities of Daily Living (IADLs):   Walk and transfer into and out of bed and chair?: Yes  Dress and groom yourself?: Yes    Bathe or shower yourself?: Yes    Feed yourself? Yes  Do your laundry/housekeeping?: Yes  Manage your money, pay your bills and track your expenses?: No  Make your own meals?: Yes    Do your own shopping?: No    Previous Hospitalizations:   Any hospitalizations or ED visits within the last 12 months?: Yes    How many hospitalizations have you had in the last year?: 1-2    Hospitalization Comments: ED- for dizziness    Advance Care Planning:   Living will: Yes    Durable POA for healthcare:  Yes    Advanced directive: Yes      Cognitive Screening:   Provider or family/friend/caregiver concerned regarding cognition?: No    PREVENTIVE SCREENINGS      Cardiovascular Screening:    General: Screening Not Indicated, History Lipid Disorder, Risks and Benefits Discussed and Screening Current      Diabetes Screening:     General: Screening Current and Risks and Benefits Discussed      Colorectal Cancer Screening:     General: Screening Not Indicated      Breast Cancer Screening:     General: Screening Not Indicated      Cervical Cancer Screening:    General: Screening Not Indicated      Osteoporosis Screening:    General: Screening Not Indicated      Abdominal Aortic Aneurysm (AAA) Screening:        General: Screening Not Indicated      Lung Cancer Screening:     General: Screening Not Indicated      Hepatitis C Screening:    General: Screening Not Indicated    Other Counseling Topics:   Regular weightbearing exercise           Richard Monique MD

## 2020-09-04 NOTE — PROGRESS NOTES
Assessment/Plan:       Problem List Items Addressed This Visit        Nervous and Auditory    Parkinson's disease ([facility] [address] ) - Primary     Follow-up with neurology          Carpal tunnel syndrome of left wrist     Continue bracing  Offered referral to Orthopedics, patient declined at this time  Other    Medicare annual wellness visit, subsequent    Weight loss     Will add TSH to recent blood work  Advised her to track caloric intake         Relevant Orders    TSH, 3rd generation    Mixed hyperlipidemia     Lipids well controlled  Continue current regimen         Relevant Medications    atorvastatin (LIPITOR) 10 mg tablet      Other Visit Diagnoses     Skin lesion of cheek        Relevant Orders    Ambulatory referral to Dermatology    Need for pneumococcal vaccine                Subjective:      Patient ID: Louvella Siemens is a 80 y o  female  80year old here with daughter  Parkinson's - she sees Neurology  Is struggling with symptoms  On Sinemet and Azilect  Has appt with Dr Usman Naylor in November  Carpal Tunnel - on Left side, wakes up at night with pain, tingling in the hand  She has had surgery on the R side  Says she is losing weight  Has lost 7 lbs in past year  Reports normal appetite but per daughter, she does not eat much  No exercise  Lipids - are well controlled on Atorvastatin  She states she had a stroke in her eye and that is when she started taking the medication  Has lesion on cheek that won't heal  Has been there for "awhile"  The following portions of the patient's history were reviewed and updated as appropriate: She  has a past medical history of Bruit of left carotid artery, Cervicalgia, Constipation, CTS (carpal tunnel syndrome), Hypercholesteremia, Nausea, Parkinson's disease ([facility] [address] ), and Tremor    She   Patient Active Problem List    Diagnosis Date Noted    Medicare annual wellness visit, subsequent 09/04/2020    Weight loss 09/04/2020    Mixed hyperlipidemia 09/04/2020    Carpal tunnel syndrome of left wrist 08/05/2016    Idiopathic peripheral neuropathy 11/30/2015    Parkinson's disease ([facility] [address] [facility] ) 11/15/2013     She  has a past surgical history that includes Breast lumpectomy (Bilateral); Neuroplasty / transposition median nerve at carpal tunnel (Right); and Lumbar fusion  Her family history includes Cancer in her mother; Diabetes in her sister; Heart disease in her sister; No Known Problems in her father  She  reports that she has never smoked  She has never used smokeless tobacco  She reports that she does not drink alcohol or use drugs  Current Outpatient Medications   Medication Sig Dispense Refill    acetaminophen (TYLENOL) 325 mg tablet Take 1 tablet by mouth daily as needed      aspirin 81 MG tablet Take 1 tablet by mouth daily      atorvastatin (LIPITOR) 10 mg tablet Take 1 tablet (10 mg total) by mouth daily 90 tablet 3    carbidopa-levodopa (SINEMET)  mg per tablet Take 1 tablet by mouth 4 (four) times a day 120 tablet 5    dibucaine (NUPERCAINAL) 1 % ointment Apply topically 3 (three) times a day (Patient taking differently: Apply topically as needed ) 30 g 0    polyethylene glycol (MIRALAX) 17 g packet Take 17 g by mouth daily      rasagiline (AZILECT) 1 MG Take 1 tablet (1 mg total) by mouth daily 30 tablet 4    Sennosides 25 MG TABS Take 1 tablet by mouth daily as needed         No current facility-administered medications for this visit        Current Outpatient Medications on File Prior to Visit   Medication Sig    acetaminophen (TYLENOL) 325 mg tablet Take 1 tablet by mouth daily as needed    aspirin 81 MG tablet Take 1 tablet by mouth daily    carbidopa-levodopa (SINEMET)  mg per tablet Take 1 tablet by mouth 4 (four) times a day    dibucaine (NUPERCAINAL) 1 % ointment Apply topically 3 (three) times a day (Patient taking differently: Apply topically as needed )    polyethylene glycol (MIRALAX) 17 g packet Take 17 g by mouth daily    rasagiline (AZILECT) 1 MG Take 1 tablet (1 mg total) by mouth daily    Sennosides 25 MG TABS Take 1 tablet by mouth daily as needed      [DISCONTINUED] atorvastatin (LIPITOR) 10 mg tablet TAKE ONE TABLET BY MOUTH ONE TIME DAILY     [DISCONTINUED] meclizine (ANTIVERT) 25 mg tablet Take 1 tablet (25 mg total) by mouth 3 (three) times a day as needed for dizziness (Patient not taking: Reported on 9/4/2020)     No current facility-administered medications on file prior to visit  She has No Known Allergies       Review of Systems   Constitutional: Positive for fatigue and unexpected weight change  Negative for activity change, appetite change, chills and fever  HENT: Negative for congestion, ear discharge, ear pain, postnasal drip, sinus pressure and sore throat  Eyes: Negative for discharge and visual disturbance  Respiratory: Negative for cough, shortness of breath and wheezing  Cardiovascular: Negative for chest pain, palpitations and leg swelling  Gastrointestinal: Negative for abdominal pain, constipation, diarrhea, nausea and vomiting  Endocrine: Negative for cold intolerance, heat intolerance, polydipsia and polyuria  Genitourinary: Negative for difficulty urinating and frequency  Musculoskeletal: Negative for arthralgias, back pain, joint swelling and myalgias  Skin: Negative for rash  Lesion on R cheek   Neurological: Positive for dizziness  Negative for weakness, light-headedness, numbness and headaches  Abnormal movement   Hematological: Negative for adenopathy  Psychiatric/Behavioral: Negative for behavioral problems, confusion, dysphoric mood, sleep disturbance and suicidal ideas  The patient is not nervous/anxious  Objective:      /72   Pulse 68   Temp 98 3 °F (36 8 °C)   Ht 4' 10" (1 473 m)   Wt 42 2 kg (93 lb)   SpO2 96%   BMI 19 44 kg/m²          Physical Exam  Vitals signs and nursing note reviewed     Constitutional: General: She is not in acute distress  Appearance: Normal appearance  She is not ill-appearing, toxic-appearing or diaphoretic  HENT:      Head: Normocephalic and atraumatic  Right Ear: Tympanic membrane, ear canal and external ear normal       Left Ear: Tympanic membrane, ear canal and external ear normal       Mouth/Throat:      Mouth: Mucous membranes are moist    Cardiovascular:      Rate and Rhythm: Normal rate and regular rhythm  Heart sounds: No murmur  No friction rub  Pulmonary:      Effort: Pulmonary effort is normal  No respiratory distress  Breath sounds: Normal breath sounds  No stridor  No wheezing, rhonchi or rales  Musculoskeletal:         General: No swelling  Skin:     Comments: Pearly lesion with scab in center of R cheek   Neurological:      General: No focal deficit present  Mental Status: She is alert  Mental status is at baseline  Comments: akithisia    Psychiatric:         Attention and Perception: Attention normal          Mood and Affect: Mood normal          Speech: Speech normal          Behavior: Behavior normal          Thought Content:  Thought content normal          Judgment: Judgment normal

## 2020-09-04 NOTE — PATIENT INSTRUCTIONS
Medicare Preventive Visit Patient Instructions  Thank you for completing your Welcome to Medicare Visit or Medicare Annual Wellness Visit today  Your next wellness visit will be due in one year (9/4/2021)  The screening/preventive services that you may require over the next 5-10 years are detailed below  Some tests may not apply to you based off risk factors and/or age  Screening tests ordered at today's visit but not completed yet may show as past due  Also, please note that scanned in results may not display below  Preventive Screenings:  Service Recommendations Previous Testing/Comments   Colorectal Cancer Screening  * Colonoscopy    * Fecal Occult Blood Test (FOBT)/Fecal Immunochemical Test (FIT)  * Fecal DNA/Cologuard Test  * Flexible Sigmoidoscopy Age: 54-65 years old   Colonoscopy: every 10 years (may be performed more frequently if at higher risk)  OR  FOBT/FIT: every 1 year  OR  Cologuard: every 3 years  OR  Sigmoidoscopy: every 5 years  Screening may be recommended earlier than age 48 if at higher risk for colorectal cancer  Also, an individualized decision between you and your healthcare provider will decide whether screening between the ages of 74-80 would be appropriate  Colonoscopy: Not on file  FOBT/FIT: Not on file  Cologuard: Not on file  Sigmoidoscopy: 09/05/2019    Screening Not Indicated     Breast Cancer Screening Age: 36 years old  Frequency: every 1-2 years  Not required if history of left and right mastectomy Mammogram: Not on file       Cervical Cancer Screening Between the ages of 21-29, pap smear recommended once every 3 years  Between the ages of 33-67, can perform pap smear with HPV co-testing every 5 years     Recommendations may differ for women with a history of total hysterectomy, cervical cancer, or abnormal pap smears in past  Pap Smear: Not on file    Screening Not Indicated   Hepatitis C Screening Once for adults born between 1945 and 1965  More frequently in patients at high risk for Hepatitis C Hep C Antibody: Not on file       Diabetes Screening 1-2 times per year if you're at risk for diabetes or have pre-diabetes Fasting glucose: 97 mg/dL   A1C: No results in last 5 years    Screening Current   Cholesterol Screening Once every 5 years if you don't have a lipid disorder  May order more often based on risk factors  Lipid panel: 09/03/2020    Screening Not Indicated  History Lipid Disorder     Other Preventive Screenings Covered by Medicare:  1  Abdominal Aortic Aneurysm (AAA) Screening: covered once if your at risk  You're considered to be at risk if you have a family history of AAA  2  Lung Cancer Screening: covers low dose CT scan once per year if you meet all of the following conditions: (1) Age 50-69; (2) No signs or symptoms of lung cancer; (3) Current smoker or have quit smoking within the last 15 years; (4) You have a tobacco smoking history of at least 30 pack years (packs per day multiplied by number of years you smoked); (5) You get a written order from a healthcare provider  3  Glaucoma Screening: covered annually if you're considered high risk: (1) You have diabetes OR (2) Family history of glaucoma OR (3)  aged 48 and older OR (3)  American aged 72 and older  3  Osteoporosis Screening: covered every 2 years if you meet one of the following conditions: (1) You're estrogen deficient and at risk for osteoporosis based off medical history and other findings; (2) Have a vertebral abnormality; (3) On glucocorticoid therapy for more than 3 months; (4) Have primary hyperparathyroidism; (5) On osteoporosis medications and need to assess response to drug therapy  · Last bone density test (DXA Scan): 08/29/2013  5  HIV Screening: covered annually if you're between the age of 12-76  Also covered annually if you are younger than 13 and older than 72 with risk factors for HIV infection   For pregnant patients, it is covered up to 3 times per pregnancy  Immunizations:  Immunization Recommendations   Influenza Vaccine Annual influenza vaccination during flu season is recommended for all persons aged >= 6 months who do not have contraindications   Pneumococcal Vaccine (Prevnar and Pneumovax)  * Prevnar = PCV13  * Pneumovax = PPSV23   Adults 25-60 years old: 1-3 doses may be recommended based on certain risk factors  Adults 72 years old: Prevnar (PCV13) vaccine recommended followed by Pneumovax (PPSV23) vaccine  If already received PPSV23 since turning 65, then PCV13 recommended at least one year after PPSV23 dose  Hepatitis B Vaccine 3 dose series if at intermediate or high risk (ex: diabetes, end stage renal disease, liver disease)   Tetanus (Td) Vaccine - COST NOT COVERED BY MEDICARE PART B Following completion of primary series, a booster dose should be given every 10 years to maintain immunity against tetanus  Td may also be given as tetanus wound prophylaxis  Tdap Vaccine - COST NOT COVERED BY MEDICARE PART B Recommended at least once for all adults  For pregnant patients, recommended with each pregnancy  Shingles Vaccine (Shingrix) - COST NOT COVERED BY MEDICARE PART B  2 shot series recommended in those aged 48 and above     Health Maintenance Due:  There are no preventive care reminders to display for this patient  Immunizations Due:      Topic Date Due    DTaP,Tdap,and Td Vaccines (1 - Tdap) 05/25/1956     Advance Directives   What are advance directives? Advance directives are legal documents that state your wishes and plans for medical care  These plans are made ahead of time in case you lose your ability to make decisions for yourself  Advance directives can apply to any medical decision, such as the treatments you want, and if you want to donate organs  What are the types of advance directives? There are many types of advance directives, and each state has rules about how to use them   You may choose a combination of any of the following:  · Living will: This is a written record of the treatment you want  You can also choose which treatments you do not want, which to limit, and which to stop at a certain time  This includes surgery, medicine, IV fluid, and tube feedings  · Durable power of  for healthcare [facility]): This is a written record that states who you want to make healthcare choices for you when you are unable to make them for yourself  This person, called a proxy, is usually a family member or a friend  You may choose more than 1 proxy  · Do not resuscitate (DNR) order:  A DNR order is used in case your heart stops beating or you stop breathing  It is a request not to have certain forms of treatment, such as CPR  A DNR order may be included in other types of advance directives  · Medical directive: This covers the care that you want if you are in a coma, near death, or unable to make decisions for yourself  You can list the treatments you want for each condition  Treatment may include pain medicine, surgery, blood transfusions, dialysis, IV or tube feedings, and a ventilator (breathing machine)  · Values history: This document has questions about your views, beliefs, and how you feel and think about life  This information can help others choose the care that you would choose  Why are advance directives important? An advance directive helps you control your care  Although spoken wishes may be used, it is better to have your wishes written down  Spoken wishes can be misunderstood, or not followed  Treatments may be given even if you do not want them  An advance directive may make it easier for your family to make difficult choices about your care  Fall Prevention    Fall prevention  includes ways to make your home and other areas safer  It also includes ways you can move more carefully to prevent a fall   Health conditions that cause changes in your blood pressure, vision, or muscle strength and coordination may increase your risk for falls  Medicines may also increase your risk for falls if they make you dizzy, weak, or sleepy  Fall prevention tips:   · Stand or sit up slowly  · Use assistive devices as directed  · Wear shoes that fit well and have soles that   · Wear a personal alarm  · Stay active  · Manage your medical conditions  Home Safety Tips:  · Add items to prevent falls in the bathroom  · Keep paths clear  · Install bright lights in your home  · Keep items you use often on shelves within reach  · Paint or place reflective tape on the edges of your stairs  © Copyright [employer] 2018 Information is for End User's use only and may not be sold, redistributed or otherwise used for commercial purposes   All illustrations and images included in CareNotes® are the copyrighted property of [employer]  or [address] [place] [address] [employer]

## 2020-09-23 ENCOUNTER — APPOINTMENT (EMERGENCY)
Dept: RADIOLOGY | Facility: HOSPITAL | Age: 85
End: 2020-09-23
Payer: MEDICARE

## 2020-09-23 ENCOUNTER — HOSPITAL ENCOUNTER (EMERGENCY)
Facility: HOSPITAL | Age: 85
Discharge: HOME | End: 2020-09-23
Attending: EMERGENCY MEDICINE | Admitting: EMERGENCY MEDICINE
Payer: MEDICARE

## 2020-09-23 ENCOUNTER — APPOINTMENT (EMERGENCY)
Dept: CT IMAGING | Facility: HOSPITAL | Age: 85
End: 2020-09-23
Payer: MEDICARE

## 2020-09-23 VITALS
TEMPERATURE: 98.5 F | SYSTOLIC BLOOD PRESSURE: 131 MMHG | HEART RATE: 96 BPM | DIASTOLIC BLOOD PRESSURE: 62 MMHG | BODY MASS INDEX: 19.86 KG/M2 | OXYGEN SATURATION: 97 % | RESPIRATION RATE: 18 BRPM | WEIGHT: 95.02 LBS

## 2020-09-23 DIAGNOSIS — S63.502A LEFT WRIST SPRAIN: Primary | ICD-10-CM

## 2020-09-23 DIAGNOSIS — G20 PARKINSON'S DISEASE (HCC): ICD-10-CM

## 2020-09-23 DIAGNOSIS — S62.627A: ICD-10-CM

## 2020-09-23 DIAGNOSIS — R42 DIZZINESS: ICD-10-CM

## 2020-09-23 LAB
ALBUMIN SERPL BCP-MCNC: 4 G/DL (ref 3.5–5)
ALP SERPL-CCNC: 70 U/L (ref 46–116)
ALT SERPL W P-5'-P-CCNC: 12 U/L (ref 12–78)
ANION GAP SERPL CALCULATED.3IONS-SCNC: 7 MMOL/L (ref 4–13)
AST SERPL W P-5'-P-CCNC: 10 U/L (ref 5–45)
ATRIAL RATE: 70 BPM
BASOPHILS # BLD AUTO: 0.03 THOUSANDS/ΜL (ref 0–0.1)
BASOPHILS NFR BLD AUTO: 0 % (ref 0–1)
BILIRUB SERPL-MCNC: 0.3 MG/DL (ref 0.2–1)
BUN SERPL-MCNC: 20 MG/DL (ref 5–25)
CALCIUM SERPL-MCNC: 8.9 MG/DL (ref 8.3–10.1)
CHLORIDE SERPL-SCNC: 102 MMOL/L (ref 100–108)
CO2 SERPL-SCNC: 29 MMOL/L (ref 21–32)
CREAT SERPL-MCNC: 0.79 MG/DL (ref 0.6–1.3)
EOSINOPHIL # BLD AUTO: 0.04 THOUSAND/ΜL (ref 0–0.61)
EOSINOPHIL NFR BLD AUTO: 0 % (ref 0–6)
ERYTHROCYTE [DISTWIDTH] IN BLOOD BY AUTOMATED COUNT: 11.8 % (ref 11.6–15.1)
GFR SERPL CREATININE-BSD FRML MDRD: 68 ML/MIN/1.73SQ M
GLUCOSE SERPL-MCNC: 89 MG/DL (ref 65–140)
HCT VFR BLD AUTO: 39 % (ref 34.8–46.1)
HGB BLD-MCNC: 12.7 G/DL (ref 11.5–15.4)
IMM GRANULOCYTES # BLD AUTO: 0.05 THOUSAND/UL (ref 0–0.2)
IMM GRANULOCYTES NFR BLD AUTO: 1 % (ref 0–2)
LYMPHOCYTES # BLD AUTO: 1.08 THOUSANDS/ΜL (ref 0.6–4.47)
LYMPHOCYTES NFR BLD AUTO: 10 % (ref 14–44)
MCH RBC QN AUTO: 32.2 PG (ref 26.8–34.3)
MCHC RBC AUTO-ENTMCNC: 32.6 G/DL (ref 31.4–37.4)
MCV RBC AUTO: 99 FL (ref 82–98)
MONOCYTES # BLD AUTO: 0.87 THOUSAND/ΜL (ref 0.17–1.22)
MONOCYTES NFR BLD AUTO: 8 % (ref 4–12)
NEUTROPHILS # BLD AUTO: 8.41 THOUSANDS/ΜL (ref 1.85–7.62)
NEUTS SEG NFR BLD AUTO: 81 % (ref 43–75)
NRBC BLD AUTO-RTO: 0 /100 WBCS
P AXIS: 79 DEGREES
PLATELET # BLD AUTO: 205 THOUSANDS/UL (ref 149–390)
PMV BLD AUTO: 9.6 FL (ref 8.9–12.7)
POTASSIUM SERPL-SCNC: 4.2 MMOL/L (ref 3.5–5.3)
PR INTERVAL: 158 MS
PROT SERPL-MCNC: 7.7 G/DL (ref 6.4–8.2)
QRS AXIS: 29 DEGREES
QRSD INTERVAL: 80 MS
QT INTERVAL: 376 MS
QTC INTERVAL: 406 MS
RBC # BLD AUTO: 3.95 MILLION/UL (ref 3.81–5.12)
SODIUM SERPL-SCNC: 138 MMOL/L (ref 136–145)
T WAVE AXIS: 76 DEGREES
TROPONIN I SERPL-MCNC: <0.02 NG/ML
VENTRICULAR RATE: 70 BPM
WBC # BLD AUTO: 10.48 THOUSAND/UL (ref 4.31–10.16)

## 2020-09-23 PROCEDURE — G1004 CDSM NDSC: HCPCS

## 2020-09-23 PROCEDURE — 99284 EMERGENCY DEPT VISIT MOD MDM: CPT

## 2020-09-23 PROCEDURE — 93010 ELECTROCARDIOGRAM REPORT: CPT | Performed by: INTERNAL MEDICINE

## 2020-09-23 PROCEDURE — 85025 COMPLETE CBC W/AUTO DIFF WBC: CPT | Performed by: PHYSICIAN ASSISTANT

## 2020-09-23 PROCEDURE — 73130 X-RAY EXAM OF HAND: CPT

## 2020-09-23 PROCEDURE — 80053 COMPREHEN METABOLIC PANEL: CPT | Performed by: PHYSICIAN ASSISTANT

## 2020-09-23 PROCEDURE — 84484 ASSAY OF TROPONIN QUANT: CPT | Performed by: PHYSICIAN ASSISTANT

## 2020-09-23 PROCEDURE — 36415 COLL VENOUS BLD VENIPUNCTURE: CPT | Performed by: PHYSICIAN ASSISTANT

## 2020-09-23 PROCEDURE — 73110 X-RAY EXAM OF WRIST: CPT

## 2020-09-23 PROCEDURE — 93005 ELECTROCARDIOGRAM TRACING: CPT

## 2020-09-23 PROCEDURE — 1123F ACP DISCUSS/DSCN MKR DOCD: CPT | Performed by: PHYSICIAN ASSISTANT

## 2020-09-23 PROCEDURE — 70450 CT HEAD/BRAIN W/O DYE: CPT

## 2020-09-23 PROCEDURE — 99285 EMERGENCY DEPT VISIT HI MDM: CPT | Performed by: PHYSICIAN ASSISTANT

## 2020-09-23 RX ORDER — MECLIZINE HYDROCHLORIDE 25 MG/1
25 TABLET ORAL 3 TIMES DAILY PRN
Qty: 25 TABLET | Refills: 0 | Status: SHIPPED | OUTPATIENT
Start: 2020-09-23 | End: 2021-06-09 | Stop reason: ALTCHOICE

## 2020-09-23 RX ORDER — MECLIZINE HYDROCHLORIDE 25 MG/1
25 TABLET ORAL ONCE
Status: COMPLETED | OUTPATIENT
Start: 2020-09-23 | End: 2020-09-23

## 2020-09-23 RX ADMIN — MECLIZINE HYDROCHLORIDE 25 MG: 25 TABLET ORAL at 17:47

## 2020-09-23 NOTE — TRAUMA DOCUMENTATION
Bowen Easton was asked to walk patient at 1800, after dose of meclazine was given, and apply finger splint

## 2020-09-23 NOTE — ED NOTES
Per provider, patient to be ambulated  Patient ambulated independently to the restroom and back to assigned room  This writer observed the patient to walk with a shuffled gait  Patient maintained her balance and was able to use the restroom and wash her hands without assistance  Patient stated that she was still dizzy when she sat up but it lessened (not disappeared) when she stood and walked  Primary RN and provider notified       Augustus Aquino  09/23/20 8644

## 2020-09-23 NOTE — ED PROVIDER NOTES
History  Chief Complaint   Patient presents with    Fall     Pt states she fell coming up from the cellar when she stubbed her toe and fell hitting her head  Pt also states she injured her left wrist and pinky  Pt denies LOC but does take thinners      81 yo s/p fall around 0900 today  States her foot got caught on something on the ground leading her to fall forward on outstretched arms  Struck head and injured left wrist and left fifth finger  States head injury was mild  No headache at this time  Does take aspirin  Wrist pain is ulnar/radial aspects  Left fifth finger  She also reports having chronic dizziness but worse in the past 3 days  She attributes her dizziness to parkinson's  She was not feeling dizzy today when she fell  Reports it was purely a mechanical fall  History provided by:  Patient   used: No    Fall   Mechanism of injury: fall    Injury location: head, left wrist   Incident location:  Home  Time since incident:  6 hours  Arrived directly from scene: no    Fall:     Fall occurred:  Tripped    Height of fall:  Standing    Impact surface:  Hard floor    Point of impact:  Outstretched arms and head    Entrapped after fall: no    Protective equipment: none    Suspicion of alcohol use: no    Suspicion of drug use: no    Prior to arrival data:     Bystander interventions:  None  Associated symptoms: headaches    Associated symptoms: no abdominal pain, no back pain, no blindness, no chest pain, no difficulty breathing, no hearing loss, no loss of consciousness, no nausea, no neck pain, no seizures and no vomiting        Prior to Admission Medications   Prescriptions Last Dose Informant Patient Reported? Taking?    Sennosides 25 MG TABS  Self Yes No   Sig: Take 1 tablet by mouth daily as needed     acetaminophen (TYLENOL) 325 mg tablet  Self Yes No   Sig: Take 1 tablet by mouth daily as needed   aspirin 81 MG tablet  Self Yes No   Sig: Take 1 tablet by mouth daily   atorvastatin (LIPITOR) 10 mg tablet   No No   Sig: Take 1 tablet (10 mg total) by mouth daily   carbidopa-levodopa (SINEMET)  mg per tablet   No No   Sig: Take 1 tablet by mouth 4 (four) times a day   dibucaine (NUPERCAINAL) 1 % ointment  Self No No   Sig: Apply topically 3 (three) times a day   Patient taking differently: Apply topically as needed    polyethylene glycol (MIRALAX) 17 g packet  Self Yes No   Sig: Take 17 g by mouth daily   rasagiline (AZILECT) 1 MG   No No   Sig: Take 1 tablet (1 mg total) by mouth daily      Facility-Administered Medications: None       Past Medical History:   Diagnosis Date    Bruit of left carotid artery     Cervicalgia     Constipation     CTS (carpal tunnel syndrome)     right    Hypercholesteremia     Nausea     Parkinson's disease (HCC)     Tremor        Past Surgical History:   Procedure Laterality Date    BREAST LUMPECTOMY Bilateral     LUMBAR FUSION      NEUROPLASTY / TRANSPOSITION MEDIAN NERVE AT CARPAL TUNNEL Right        Family History   Problem Relation Age of Onset    Cancer Mother     No Known Problems Father     Diabetes Sister     Heart disease Sister      I have reviewed and agree with the history as documented  E-Cigarette/Vaping     E-Cigarette/Vaping Substances     Social History     Tobacco Use    Smoking status: Never Smoker    Smokeless tobacco: Never Used   Substance Use Topics    Alcohol use: No    Drug use: No       Review of Systems   Constitutional: Negative for activity change, appetite change, chills, diaphoresis, fatigue, fever and unexpected weight change  HENT: Negative for congestion, hearing loss, rhinorrhea, sinus pressure, sore throat and trouble swallowing  Eyes: Negative for blindness, photophobia and visual disturbance  Respiratory: Negative for apnea, cough, choking, chest tightness, shortness of breath, wheezing and stridor  Cardiovascular: Negative for chest pain, palpitations and leg swelling     Gastrointestinal: Negative for abdominal distention, abdominal pain, blood in stool, constipation, diarrhea, nausea and vomiting  Genitourinary: Negative for decreased urine volume, difficulty urinating, dysuria, enuresis, flank pain, frequency, hematuria and urgency  Musculoskeletal: Negative for arthralgias, back pain, myalgias, neck pain and neck stiffness  Left wrist and left hand injury   Skin: Negative for color change, pallor, rash and wound  Allergic/Immunologic: Negative  Neurological: Positive for dizziness and headaches  Negative for tremors, seizures, loss of consciousness, syncope, weakness, light-headedness and numbness  Hematological: Negative  Psychiatric/Behavioral: Negative  All other systems reviewed and are negative  Physical Exam  Physical Exam  Vitals signs and nursing note reviewed  Constitutional:       General: She is not in acute distress  Appearance: Normal appearance  She is well-developed  She is not ill-appearing, toxic-appearing or diaphoretic  HENT:      Head: Normocephalic and atraumatic  Eyes:      General: Lids are normal       Pupils: Pupils are equal, round, and reactive to light  Neck:      Musculoskeletal: Normal range of motion and neck supple  Cardiovascular:      Rate and Rhythm: Normal rate and regular rhythm  Pulses: Normal pulses  No decreased pulses  Radial pulses are 2+ on the right side and 2+ on the left side  Heart sounds: Normal heart sounds, S1 normal and S2 normal  Heart sounds not distant  No murmur  No friction rub  No gallop  Pulmonary:      Effort: Pulmonary effort is normal  No tachypnea, bradypnea, accessory muscle usage or respiratory distress  Breath sounds: Normal breath sounds  No decreased breath sounds, wheezing, rhonchi or rales  Abdominal:      General: There is no distension  Palpations: Abdomen is soft  Abdomen is not rigid  Tenderness: There is no abdominal tenderness   There is no guarding or rebound  Musculoskeletal:         General: No deformity  Left wrist: She exhibits tenderness and bony tenderness  She exhibits normal range of motion, no swelling and no effusion  Left hand: She exhibits tenderness, bony tenderness and swelling  She exhibits normal range of motion, normal capillary refill, no deformity and no laceration  Normal sensation noted  Normal strength noted  Hands:    Skin:     General: Skin is warm and dry  Coloration: Skin is not pale  Findings: No erythema or rash  Neurological:      Mental Status: She is alert and oriented to person, place, and time  GCS: GCS eye subscore is 4  GCS verbal subscore is 5  GCS motor subscore is 6  Cranial Nerves: No cranial nerve deficit  Comments: GCS 15  AAOx3  Ambulating in department without difficulty  CN II-XII grossly intact  No focal neuro deficits  Bilateral lower extremity tremors       Psychiatric:         Speech: Speech normal          Vital Signs  ED Triage Vitals   Temperature Pulse Respirations Blood Pressure SpO2   09/23/20 1640 09/23/20 1640 09/23/20 1640 09/23/20 1640 09/23/20 1640   98 5 °F (36 9 °C) 71 18 126/64 96 %      Temp Source Heart Rate Source Patient Position - Orthostatic VS BP Location FiO2 (%)   09/23/20 1640 09/23/20 1640 09/23/20 1740 09/23/20 1821 --   Oral Monitor Lying Left arm       Pain Score       --                  Vitals:    09/23/20 1740 09/23/20 1821 09/23/20 1823 09/23/20 1825   BP: 132/66 125/58 137/63 131/62   Pulse: 81 85 86 96   Patient Position - Orthostatic VS: Lying Lying - Orthostatic VS Sitting - Orthostatic VS Standing - Orthostatic VS         Visual Acuity  Visual Acuity      Most Recent Value   L Pupil Size (mm)  3   R Pupil Size (mm)  3          ED Medications  Medications   meclizine (ANTIVERT) tablet 25 mg (25 mg Oral Given 9/23/20 1747)       Diagnostic Studies  Results Reviewed     Procedure Component Value Units Date/Time Comprehensive metabolic panel [[id number]] Collected:  09/23/20 1657    Lab Status:  Final result Specimen:  Blood from Arm, Right Updated:  09/23/20 1734     Sodium 138 mmol/L      Potassium 4 2 mmol/L      Chloride 102 mmol/L      CO2 29 mmol/L      ANION GAP 7 mmol/L      BUN 20 mg/dL      Creatinine 0 79 mg/dL      Glucose 89 mg/dL      Calcium 8 9 mg/dL      AST 10 U/L      ALT 12 U/L      Alkaline Phosphatase 70 U/L      Total Protein 7 7 g/dL      Albumin 4 0 g/dL      Total Bilirubin 0 30 mg/dL      eGFR 68 ml/min/1 73sq m     Narrative:       National Kidney Disease Foundation guidelines for Chronic Kidney Disease (CKD):     Stage 1 with normal or high GFR (GFR > 90 mL/min/1 73 square meters)    Stage 2 Mild CKD (GFR = 60-89 mL/min/1 73 square meters)    Stage 3A Moderate CKD (GFR = 45-59 mL/min/1 73 square meters)    Stage 3B Moderate CKD (GFR = 30-44 mL/min/1 73 square meters)    Stage 4 Severe CKD (GFR = 15-29 mL/min/1 73 square meters)    Stage 5 End Stage CKD (GFR <15 mL/min/1 73 square meters)  Note: GFR calculation is accurate only with a steady state creatinine    Troponin I [[id number]]  (Normal) Collected:  09/23/20 1657    Lab Status:  Final result Specimen:  Blood from Arm, Right Updated:  09/23/20 1728     Troponin I <0 02 ng/mL     CBC and differential [[id number]]  (Abnormal) Collected:  09/23/20 1657    Lab Status:  Final result Specimen:  Blood from Arm, Right Updated:  09/23/20 1709     WBC 10 48 Thousand/uL      RBC 3 95 Million/uL      Hemoglobin 12 7 g/dL      Hematocrit 39 0 %      MCV 99 fL      MCH 32 2 pg      MCHC 32 6 g/dL      RDW 11 8 %      MPV 9 6 fL      Platelets 490 Thousands/uL      nRBC 0 /100 WBCs      Neutrophils Relative 81 %      Immat GRANS % 1 %      Lymphocytes Relative 10 %      Monocytes Relative 8 %      Eosinophils Relative 0 %      Basophils Relative 0 %      Neutrophils Absolute 8 41 Thousands/µL      Immature Grans Absolute 0 05 Thousand/uL Lymphocytes Absolute 1 08 Thousands/µL      Monocytes Absolute 0 87 Thousand/µL      Eosinophils Absolute 0 04 Thousand/µL      Basophils Absolute 0 03 Thousands/µL                  CT head without contrast   Final Result by Sunny Daniel MD (09/23 1733)      No acute intracranial abnormality  Workstation performed: [id number]         XR hand 3+ views LEFT   ED Interpretation by Cheikh Haider PA-C (09/23 1743)   Fracture left fifth middle phalanx      XR wrist 3+ views LEFT    (Results Pending)              Procedures  ECG 12 Lead Documentation Only    Date/Time: 9/23/2020 5:12 PM  Performed by: Cheikh Haider PA-C  Authorized by: Cheikh Haider PA-C     Indications / Diagnosis:  Dizziness  fall  ECG reviewed by me, the ED Provider: yes    Patient location:  ED  Previous ECG:     Previous ECG:  Compared to current    Comparison ECG info:  Aug 9 2020    Similarity:  Changes noted    Comparison to cardiac monitor: Yes    Interpretation:     Interpretation: abnormal    Quality:     Tracing quality:  Limited by artifact  Rate:     ECG rate:  70    ECG rate assessment: normal    Rhythm:     Rhythm: sinus rhythm    Ectopy:     Ectopy: none    QRS:     QRS axis:  Normal    QRS intervals:  Normal  Conduction:     Conduction: normal    ST segments:     ST segments:  Normal  T waves:     T waves: non-specific    Comments:      murphy             ED Course  ED Course as of Sep 23 1854   Wed Sep 23, 2020   1746 Pt has previously been taking dramamine for her dizziness but not helping in past 3 days  Will trial meclizine  Ambulate with walker  Dispo pending  1831 Pt ambulated with walker  Did well  Offered admission given continued dizziness however pt feels she would rather go home  Reports "I've been putting up with it for so long I'd rather go home"  Pt states she feels safe going home and safe at home  Pt will use walker and cane to ambulate                HEART Risk Score      Most Recent Value   Heart Score Risk Calculator   History  0 Filed at: 09/23/2020 1834   ECG  1 Filed at: 09/23/2020 1834   Age  2 Filed at: 09/23/2020 1834   Risk Factors  1 Filed at: 09/23/2020 1834   Troponin  0 Filed at: 09/23/2020 1834   HEART Score  4 Filed at: 09/23/2020 1834                                  MDM  Number of Diagnoses or Management Options  Dizziness: new and requires workup  Fracture of middle phalanx of left little finger: new and requires workup  Left wrist sprain: new and requires workup  Parkinson's disease [facility]): new and requires workup  Diagnosis management comments: Differential diagnosis including but not limited to: sprain, strain, fracture, dislocation, contusion, ICH, doubt CVA, MI, ACS, dehydration, arrhythmia  Plan: Cardiac workup  CT head  XR left wrist and finger  Dispo pending  Amount and/or Complexity of Data Reviewed  Clinical lab tests: ordered and reviewed  Tests in the radiology section of CPT®: ordered and reviewed  Independent visualization of images, tracings, or specimens: yes    Risk of Complications, Morbidity, and/or Mortality  Presenting problems: moderate  Management options: low  General comments: 81 yo female with mechanical fall  XR reveal fx of left little finger middle phalanx  Metal finger splint applied  She is feeling better  Ambulating in department with walker  Pt feels this is her typical dizziness and would like to go home  Pt was offered admission but declined  Recommended ortho and pcp follow up  Return parameters provided  Pt understands and agrees with plan        Patient Progress  Patient progress: stable      Disposition  Final diagnoses:   Left wrist sprain   Fracture of middle phalanx of left little finger   Dizziness   Parkinson's disease ([facility] [address] )     Time reflects when diagnosis was documented in both MDM as applicable and the Disposition within this note     Time User Action Codes Description Comment    9/23/2020  6:32 PM Torito Lieberman Add [E79 [id number]] Left wrist sprain     9/23/2020  6:32 PM Rush Guppy Add [C51 [id number]] Fracture of middle phalanx of left little finger     9/23/2020  6:32 PM Rush Guppy Add [R42] Dizziness     9/23/2020  6:36 PM Rush Guppy Add [G20] Parkinson's disease [facility])       ED Disposition     ED Disposition Condition Date/Time Comment    Discharge Stable Wed Sep 23, 2020  6:32 PM Warren Stern discharge to home/self care              Follow-up Information     Follow up With Specialties Details Why Contact Info    Breanna Mars MD Family Medicine Call in 1 day  [address] [facility]  [address] [facility]      Jordin Jones MD Orthopedic Surgery, Hand Surgery, Orthopedics Call in 1 day for follow up appointment [address] [address]  [phone]            Patient's Medications   Discharge Prescriptions    MECLIZINE (ANTIVERT) 25 MG TABLET    Take 1 tablet (25 mg total) by mouth 3 (three) times a day as needed for dizziness       Start Date: 9/23/2020 End Date: --       Order Dose: 25 mg       Quantity: 25 tablet    Refills: 0     Outpatient Discharge Orders   Walker       PDMP Review     None          ED Provider  Electronically Signed by           Marco Lynn PA-C  09/23/20 Agnes Kwon

## 2020-09-25 ENCOUNTER — TELEPHONE (OUTPATIENT)
Dept: FAMILY MEDICINE CLINIC | Facility: CLINIC | Age: 85
End: 2020-09-25

## 2020-09-25 DIAGNOSIS — R42 DIZZINESS: Primary | ICD-10-CM

## 2020-09-25 DIAGNOSIS — R42 VERTIGO: ICD-10-CM

## 2020-09-25 DIAGNOSIS — Z13.21 ENCOUNTER FOR VITAMIN DEFICIENCY SCREENING: ICD-10-CM

## 2020-09-30 ENCOUNTER — EVALUATION (OUTPATIENT)
Dept: PHYSICAL THERAPY | Facility: CLINIC | Age: 85
End: 2020-09-30
Payer: MEDICARE

## 2020-09-30 DIAGNOSIS — R42 VERTIGO: ICD-10-CM

## 2020-09-30 DIAGNOSIS — H81.13 BPPV (BENIGN PAROXYSMAL POSITIONAL VERTIGO), BILATERAL: Primary | ICD-10-CM

## 2020-09-30 PROCEDURE — 97162 PT EVAL MOD COMPLEX 30 MIN: CPT

## 2020-09-30 PROCEDURE — 95992 CANALITH REPOSITIONING PROC: CPT

## 2020-09-30 NOTE — PROGRESS NOTES
PT Evaluation     Today's date: 2020  Patient name: Tommy Valentin  : 1935  MRN: [id number]  Referring provider: Ousmane Agudelo MD  Dx:   Encounter Diagnosis     ICD-10-CM    1  BPPV (benign paroxysmal positional vertigo), bilateral  H81 13    2  Vertigo  R42 Ambulatory referral to Physical Therapy       Assessment  Assessment details: Tommy Valentin is a 80 y o female who was referred for outpatient vestibular physical therapy with the chief complaint of dizziness for the past 6 months that has recently started to worsen  She notes that symptoms are typically triggered by position changes, such as supine to/from sitting, and sometimes by head movements  PT examination findings include: L upbeating torsional nystagmus in L Wily-Hallpike position with symptoms lasting 30+ seconds  She also reported symptoms with R Wily-Hallpike position; however, unable to assess for nystagmus as she shut her eyes as symptoms presented  The remainder of the oculomotor screen was normal  These findings are consistent with L posterior semi-circular canal (PSCC) benign paroxsymal positional vertigo (BPPV) with possible involvement of the R PSCC as well  L Epley maneuver was performed today and tolerated, but had difficulty getting into proper positions for the technique secondary to movement difficulty due to her Parkinson's Disease  Will consider Semont maneuver instead next session  Her and her daughter were educated about her condition and expectations with treatment  The patient will benefit from skilled physical therapy to provide canalith repositioning, exercises, neuromuscular reeducation, manual techniques, and modalities as deemed necessary in order to help her achieve her goals and maximize her safety with functional mobility         Impairments: activity intolerance, impaired balance and safety issue  Other impairment: dizziness    Symptom irritability: moderateUnderstanding of Dx/Px/POC: good   Prognosis: good    Goals  STGs in 3 weeks  1  Patient will be independent with HEP (for VOR, movement deficits, and/or balance as needed)  2  Patient will report an improvement in dizziness symptoms by at least 50%  LTGs in 6 weeks  1  Patient will report being symptom free for 7 consecutive days  2  Patient will not have any symptom provocation during a Holliday Hallpike maneuver bilaterally  3  Patient will be able to perform all bed mobility without provocation of dizziness symptoms  Plan  Patient would benefit from: skilled physical therapy  Planned therapy interventions: manual therapy, balance, neuromuscular re-education, canalith repositioning, patient education, strengthening, stretching, therapeutic activities, therapeutic exercise and home exercise program  Frequency: 2x week  Duration in weeks: 6  Treatment plan discussed with: family and patient        Subjective Evaluation    History of Present Illness  Mechanism of injury: Virgen Lion presents with her daughter Rodo Kim  She states that she started getting dizzy about 5-6 months ago  Over the course of the past few months, she feels that the dizziness has gotten worse  She notes that some times it is worse than others  She thinks that it is worse in the morning as once she is sitting up she has to wait a few minutes until it subsides  She also feels dizzy when she goes up a step  She gets some symptoms intermittently with head movements  She notes that she can't concentrate on an activity for too long as she gets a lot of pressure in her head and feels like her head is going to pop  Her daughter wonders if the dizziness could contributed to her not eating or drinking a lot  She was prescribed Meclizine in beginning of August and took it a few times, but no real effect on the symptoms  She recently had a fall as she tripped over her feet and fell spraining her pinky finger  Since the fall, she has been avoiding stairs and been using a rollator   Prior to the fall, she would primarily use a cane  She lives with her son     Pain  No pain reported          Objective    Flowsheet Rows      Most Recent Value   PT/OT G-Codes   Current Score  40   Projected Score  58        Dysequilibrium: Yes  Lightheadedness: Yes  Vertigo: No  Rocking or Swaying: Yes         Oscillopsia: No  Diplopia: No  Motion sickness: No  Floating, Swimming, Disconnected: No    Exacerbation Factors:  Bending over: No  Turning Head: No  Rolling in bed: No  Walking: No  Looking up: No  Supine to/from sitting: Yes  Optokinetic movement: No    Duration of Symptoms: a few minutes  Frequency of symptoms: multiple times a day     Concurrent Complaints:  Tinnitus:No  Aural Fullness:No  Known hearing loss:No  Nausea, Vomiting: Yes  Altered Vision: Yes  Poor Concentration: Yes  Memory Loss: No  Peripheral Neuropathy:Yes--bilateral feet  Cervical Pain: No   Headache: No      PHYSICAL FINDINGS:  Oculomotor ROM: WFL  Resting nystagmus: No  Gaze holding nystagmus No   Smooth pursuit Abnormal--slow in all directions    Vertical Saccades:Normal  Horizontal Saccades:Normal  Convergence: Normal    Head thrust (room light): Normal--unsure if accurate as patient had a difficult time relaxing her head    Dynamic Visual Acuity: NT  Dynamic Head: 20/  Static Head: 20/      Positional testing: Right Left   [facility]  symptomatic, but no observed nystagmus as pt closed eyes--lasted > 30 sec Positive: upbeating L torsional nystagmus; symptoms lasted > 30 sec   Roll test:         Cervical ROM: WFL with no dizziness symptoms reported             Precautions: falls risk; Parkinson's Disease; hx of lumbar fusion      Manuals 9/30            L Epley maneuver x1                                                   Neuro Re-Ed                                                                                                        Ther Ex Ther Activity                                       Gait Training                                       Modalities

## 2020-10-02 ENCOUNTER — OFFICE VISIT (OUTPATIENT)
Dept: PHYSICAL THERAPY | Facility: CLINIC | Age: 85
End: 2020-10-02
Payer: MEDICARE

## 2020-10-02 DIAGNOSIS — R42 VERTIGO: ICD-10-CM

## 2020-10-02 DIAGNOSIS — H81.13 BPPV (BENIGN PAROXYSMAL POSITIONAL VERTIGO), BILATERAL: Primary | ICD-10-CM

## 2020-10-02 PROCEDURE — 97112 NEUROMUSCULAR REEDUCATION: CPT

## 2020-10-02 PROCEDURE — 95992 CANALITH REPOSITIONING PROC: CPT

## 2020-10-05 ENCOUNTER — OFFICE VISIT (OUTPATIENT)
Dept: PHYSICAL THERAPY | Facility: CLINIC | Age: 85
End: 2020-10-05
Payer: MEDICARE

## 2020-10-05 DIAGNOSIS — R42 VERTIGO: ICD-10-CM

## 2020-10-05 DIAGNOSIS — H81.13 BPPV (BENIGN PAROXYSMAL POSITIONAL VERTIGO), BILATERAL: Primary | ICD-10-CM

## 2020-10-05 PROCEDURE — 95992 CANALITH REPOSITIONING PROC: CPT

## 2020-10-05 PROCEDURE — 97112 NEUROMUSCULAR REEDUCATION: CPT

## 2020-10-08 ENCOUNTER — APPOINTMENT (OUTPATIENT)
Dept: PHYSICAL THERAPY | Facility: CLINIC | Age: 85
End: 2020-10-08
Payer: MEDICARE

## 2020-10-12 ENCOUNTER — APPOINTMENT (OUTPATIENT)
Dept: PHYSICAL THERAPY | Facility: CLINIC | Age: 85
End: 2020-10-12
Payer: MEDICARE

## 2020-10-15 ENCOUNTER — OFFICE VISIT (OUTPATIENT)
Dept: PHYSICAL THERAPY | Facility: CLINIC | Age: 85
End: 2020-10-15
Payer: MEDICARE

## 2020-10-15 ENCOUNTER — TELEPHONE (OUTPATIENT)
Dept: FAMILY MEDICINE CLINIC | Facility: CLINIC | Age: 85
End: 2020-10-15

## 2020-10-15 DIAGNOSIS — R42 VERTIGO: ICD-10-CM

## 2020-10-15 DIAGNOSIS — H81.13 BPPV (BENIGN PAROXYSMAL POSITIONAL VERTIGO), BILATERAL: Primary | ICD-10-CM

## 2020-10-15 PROCEDURE — 97110 THERAPEUTIC EXERCISES: CPT

## 2020-10-15 PROCEDURE — 97112 NEUROMUSCULAR REEDUCATION: CPT

## 2020-10-16 ENCOUNTER — APPOINTMENT (OUTPATIENT)
Dept: PHYSICAL THERAPY | Facility: CLINIC | Age: 85
End: 2020-10-16
Payer: MEDICARE

## 2020-10-16 DIAGNOSIS — R42 VERTIGO: ICD-10-CM

## 2020-10-16 DIAGNOSIS — G20 PARKINSON'S DISEASE (HCC): Primary | ICD-10-CM

## 2020-10-19 ENCOUNTER — TELEPHONE (OUTPATIENT)
Dept: FAMILY MEDICINE CLINIC | Facility: CLINIC | Age: 85
End: 2020-10-19

## 2020-10-20 ENCOUNTER — TELEPHONE (OUTPATIENT)
Dept: FAMILY MEDICINE CLINIC | Facility: CLINIC | Age: 85
End: 2020-10-20

## 2020-11-03 ENCOUNTER — TELEPHONE (OUTPATIENT)
Dept: FAMILY MEDICINE CLINIC | Facility: CLINIC | Age: 85
End: 2020-11-03

## 2020-11-24 ENCOUNTER — TELEPHONE (OUTPATIENT)
Dept: FAMILY MEDICINE CLINIC | Facility: CLINIC | Age: 85
End: 2020-11-24

## 2020-11-30 ENCOUNTER — OFFICE VISIT (OUTPATIENT)
Dept: NEUROLOGY | Facility: CLINIC | Age: 85
End: 2020-11-30
Payer: MEDICARE

## 2020-11-30 VITALS
WEIGHT: 94 LBS | HEART RATE: 92 BPM | DIASTOLIC BLOOD PRESSURE: 68 MMHG | BODY MASS INDEX: 19.73 KG/M2 | HEIGHT: 58 IN | TEMPERATURE: 96.8 F | SYSTOLIC BLOOD PRESSURE: 139 MMHG

## 2020-11-30 DIAGNOSIS — G20 PARKINSON'S DISEASE (HCC): Primary | ICD-10-CM

## 2020-11-30 PROCEDURE — 99215 OFFICE O/P EST HI 40 MIN: CPT | Performed by: PSYCHIATRY & NEUROLOGY

## 2020-11-30 RX ORDER — WHEAT DEXTRIN 3 G/3.8 G
1 POWDER (GRAM) ORAL DAILY
COMMUNITY

## 2020-11-30 RX ORDER — CARBIDOPA 25 MG/1
25 TABLET ORAL 4 TIMES DAILY
Qty: 120 TABLET | Refills: 3 | Status: SHIPPED | OUTPATIENT
Start: 2020-11-30

## 2020-12-01 ENCOUNTER — TELEPHONE (OUTPATIENT)
Dept: NEUROLOGY | Facility: CLINIC | Age: 85
End: 2020-12-01

## 2020-12-14 ENCOUNTER — TELEPHONE (OUTPATIENT)
Dept: NEUROLOGY | Facility: CLINIC | Age: 85
End: 2020-12-14

## 2020-12-21 DIAGNOSIS — G20 PARKINSON'S DISEASE (HCC): Primary | ICD-10-CM

## 2020-12-21 RX ORDER — SAFINAMIDE MESYLATE 50 MG/1
50 TABLET, FILM COATED ORAL DAILY
Qty: 30 TABLET | Refills: 3 | Status: SHIPPED | OUTPATIENT
Start: 2020-12-21 | End: 2021-06-09 | Stop reason: ALTCHOICE

## 2021-01-04 ENCOUNTER — TELEPHONE (OUTPATIENT)
Dept: NEUROLOGY | Facility: CLINIC | Age: 86
End: 2021-01-04

## 2021-01-04 DIAGNOSIS — G20 PARKINSON'S DISEASE (HCC): Primary | ICD-10-CM

## 2021-01-04 DIAGNOSIS — R42 DIZZINESS AND GIDDINESS: ICD-10-CM

## 2021-01-04 DIAGNOSIS — R42 DIZZINESS AND GIDDINESS: Primary | ICD-10-CM

## 2021-01-04 NOTE — TELEPHONE ENCOUNTER
Pt called still c/o dizziness  She stopped carbidopa 25 mg almost a month ago  It was getting better after few days of stopping carbidopa but dizziness is worse again  "My right ear pop all the time when she swallow"  Pt states that she had PT in Sept which helped her vertigo  Pt is requesting PT referral      Also pt is asking if there are other test you can order to find out what is causing her dizziness                   [phone] ok to leave detailed message

## 2021-01-04 NOTE — TELEPHONE ENCOUNTER
Called and advised pt of the below  She verbalized clear understanding  She is also agreeable to see ENT  Requesting referral be mailed to the address on file

## 2021-01-18 ENCOUNTER — TELEPHONE (OUTPATIENT)
Dept: NEUROLOGY | Facility: CLINIC | Age: 86
End: 2021-01-18

## 2021-01-18 NOTE — TELEPHONE ENCOUNTER
Patient c/o worsening b/l hand and b/l feet shaking  Also c/o feeling nauseated  Dizziness has improved slightly  She doesn't notice sxs worse any specific time of day  Denies any other new or worsening sxs  Stopped the xadago a few weeks ago because of SE  Carbidopa 25 mg QID    She stated she doesn't take the meclizine but has it on hand  Please advise

## 2021-01-26 DIAGNOSIS — G20 PARKINSON DISEASE (HCC): ICD-10-CM

## 2021-01-26 NOTE — TELEPHONE ENCOUNTER
Patient requesting sinemet refill  Jewel  Patient requesting a call back once it's been sent to pharm

## 2021-03-29 ENCOUNTER — IMMUNIZATIONS (OUTPATIENT)
Dept: FAMILY MEDICINE CLINIC | Facility: HOSPITAL | Age: 86
End: 2021-03-29

## 2021-03-29 DIAGNOSIS — Z23 ENCOUNTER FOR IMMUNIZATION: Primary | ICD-10-CM

## 2021-03-29 PROCEDURE — 91300 SARS-COV-2 / COVID-19 MRNA VACCINE (PFIZER-BIONTECH) 30 MCG: CPT

## 2021-03-29 PROCEDURE — 0001A SARS-COV-2 / COVID-19 MRNA VACCINE (PFIZER-BIONTECH) 30 MCG: CPT

## 2021-03-31 ENCOUNTER — PROCEDURE VISIT (OUTPATIENT)
Dept: SURGERY | Facility: CLINIC | Age: 86
End: 2021-03-31
Payer: MEDICARE

## 2021-03-31 VITALS
SYSTOLIC BLOOD PRESSURE: 128 MMHG | RESPIRATION RATE: 18 BRPM | TEMPERATURE: 97.2 F | WEIGHT: 97 LBS | HEIGHT: 58 IN | BODY MASS INDEX: 20.36 KG/M2 | DIASTOLIC BLOOD PRESSURE: 64 MMHG | HEART RATE: 74 BPM

## 2021-03-31 DIAGNOSIS — C44.91 BASAL CELL CARCINOMA: Primary | ICD-10-CM

## 2021-03-31 DIAGNOSIS — C44.319 BASAL CELL CARCINOMA (BCC) OF RIGHT CHEEK: ICD-10-CM

## 2021-03-31 PROCEDURE — 88305 TISSUE EXAM BY PATHOLOGIST: CPT | Performed by: STUDENT IN AN ORGANIZED HEALTH CARE EDUCATION/TRAINING PROGRAM

## 2021-03-31 PROCEDURE — 11641 EXC F/E/E/N/L MAL+MRG 0.6-1: CPT | Performed by: SURGERY

## 2021-03-31 PROCEDURE — 99203 OFFICE O/P NEW LOW 30 MIN: CPT | Performed by: SURGERY

## 2021-03-31 NOTE — PROGRESS NOTES
Assessment/Plan:    Basal cell carcinoma (BCC) of right cheek   The patient is a pleasant 42-year-old female presenting with a new diagnosis of right cheek basal cell carcinoma status post shave biopsy by her dermatologist for which definitive treatment by excisional biopsy here in the office under local anesthesia is now indicated  On physical examination the patient is a pleasant well-nourished well-developed female  She is competent reliable as a historian  Awake alert and oriented  Inspection of the skin of the right cheek reveals a 6 by 5 mm area of skin in the central right buccal cheek that is the area of confirmed skin cancer  The technical excisions of excisional biopsy under local anesthesia here in the office were explained to the patient the presence of her daughter  Benefits risks and alternatives also discussed and informed verbal consent taken to proceed  Subjective:      Patient ID: Jhon Avelar is a 80 y o  female  Patient is today for a re-excision of right cheek basil cell ca  States that lesion was first removed by dermatology, no change in size and does have scab over the area  Liliya Henriquez MA          Review of Systems   Constitutional: Negative for chills and fever  HENT: Negative for ear pain and sore throat  Eyes: Negative for pain and visual disturbance  Respiratory: Negative for cough and shortness of breath  Cardiovascular: Negative for chest pain and palpitations  Gastrointestinal: Negative for abdominal pain and vomiting  Genitourinary: Negative for dysuria and hematuria  Musculoskeletal: Negative for arthralgias and back pain  Skin: Negative for color change and rash  Neurological: Positive for tremors  Negative for seizures and syncope  History of Parkinson's   All other systems reviewed and are negative          Objective:      /64 (BP Location: Left arm, Patient Position: Sitting, Cuff Size: Standard)   Pulse 74 Temp (!) 97 2 °F (36 2 °C) (Temporal)   Resp 18   Ht 4' 10" (1 473 m)   Wt 44 kg (97 lb)   BMI 20 27 kg/m²            Physical Exam  Constitutional:       Appearance: She is well-developed  HENT:      Head: Normocephalic and atraumatic  Eyes:      Conjunctiva/sclera: Conjunctivae normal       Pupils: Pupils are equal, round, and reactive to light  Neck:      Musculoskeletal: Normal range of motion and neck supple  Cardiovascular:      Rate and Rhythm: Normal rate and regular rhythm  Pulmonary:      Effort: Pulmonary effort is normal       Breath sounds: Normal breath sounds  Abdominal:      General: Bowel sounds are normal       Palpations: Abdomen is soft  Musculoskeletal: Normal range of motion  Skin:     General: Skin is warm and dry  Comments: Right buccal cheek 6 x 5 mm area of basal cell carcinoma   Neurological:      Mental Status: She is alert and oriented to person, place, and time  Psychiatric:         Behavior: Behavior normal          Thought Content: Thought content normal          Judgment: Judgment normal        Skin excision    Date/Time: 3/31/2021 12:37 PM  Performed by: Esmer Smith MD  Authorized by: Esmer Smith MD   Universal Protocol:  Procedure performed by: Shonna JOHN)  Consent: Verbal consent obtained  Risks and benefits: risks, benefits and alternatives were discussed  Time out: Immediately prior to procedure a "time out" was called to verify the correct patient, procedure, equipment, support staff and site/side marked as required    Timeout called at: 3/31/2021 12:37 PM   Patient understanding: patient states understanding of the procedure being performed  Patient consent: the patient's understanding of the procedure matches consent given  Site marked: the operative site was marked  Patient identity confirmed: verbally with patient      Procedure Details - Skin Excision:     Number of Lesions:  1  Lesion 1:     Body area:  Head/neck Head/neck location:  R cheek       Final defect size (mm):  6    Malignancy: malignant lesion      Skin cancer type: basal cell carcinoma    Repair type:  Linear closure    Graft type: full-thickness      Closure complexity: simple      Repair size (cm):  1     Repair Comments:  Procedure note:   With informed verbal consent under sterile conditions using aseptic technique using 1% lidocaine local anesthesia with epinephrine and bicarbonate the 6 mm right cheek skin lesion was sharply excised with a 15 scalpel and the wound closed primarily with simple interrupted sutures of 5 0 nylon

## 2021-03-31 NOTE — LETTER
March 31, 2021     Jeanette Siemens, [facility]  [facility]    Patient: Julito Pratt   YOB: 1935   Date of Visit: 3/31/2021       Dear Dr Naveed Abdi: Thank you for referring Jacinda Tinoco to me for evaluation  Below are my notes for this consultation  If you have questions, please do not hesitate to call me  I look forward to following your patient along with you  Sincerely,        Araceli Hicks MD        CC: MD Araceli Moran MD  3/31/2021 12:38 PM  Sign when Signing Visit  Assessment/Plan:    Basal cell carcinoma (BCC) of right cheek   The patient is a pleasant 70-year-old female presenting with a new diagnosis of right cheek basal cell carcinoma status post shave biopsy by her dermatologist for which definitive treatment by excisional biopsy here in the office under local anesthesia is now indicated  On physical examination the patient is a pleasant well-nourished well-developed female  She is competent reliable as a historian  Awake alert and oriented  Inspection of the skin of the right cheek reveals a 6 by 5 mm area of skin in the central right buccal cheek that is the area of confirmed skin cancer  The technical excisions of excisional biopsy under local anesthesia here in the office were explained to the patient the presence of her daughter  Benefits risks and alternatives also discussed and informed verbal consent taken to proceed  Subjective:      Patient ID: Julito Pratt is a 80 y o  female  Patient is today for a re-excision of right cheek basil cell ca  States that lesion was first removed by dermatology, no change in size and does have scab over the area  Liliya Henriquez MA          Review of Systems   Constitutional: Negative for chills and fever  HENT: Negative for ear pain and sore throat  Eyes: Negative for pain and visual disturbance     Respiratory: Negative for cough and shortness of breath  Cardiovascular: Negative for chest pain and palpitations  Gastrointestinal: Negative for abdominal pain and vomiting  Genitourinary: Negative for dysuria and hematuria  Musculoskeletal: Negative for arthralgias and back pain  Skin: Negative for color change and rash  Neurological: Positive for tremors  Negative for seizures and syncope  History of Parkinson's   All other systems reviewed and are negative  Objective:      /64 (BP Location: Left arm, Patient Position: Sitting, Cuff Size: Standard)   Pulse 74   Temp (!) 97 2 °F (36 2 °C) (Temporal)   Resp 18   Ht 4' 10" (1 473 m)   Wt 44 kg (97 lb)   BMI 20 27 kg/m²            Physical Exam  Constitutional:       Appearance: She is well-developed  HENT:      Head: Normocephalic and atraumatic  Eyes:      Conjunctiva/sclera: Conjunctivae normal       Pupils: Pupils are equal, round, and reactive to light  Neck:      Musculoskeletal: Normal range of motion and neck supple  Cardiovascular:      Rate and Rhythm: Normal rate and regular rhythm  Pulmonary:      Effort: Pulmonary effort is normal       Breath sounds: Normal breath sounds  Abdominal:      General: Bowel sounds are normal       Palpations: Abdomen is soft  Musculoskeletal: Normal range of motion  Skin:     General: Skin is warm and dry  Comments: Right buttock all cheek 6 x 5 mm area of basal cell carcinoma   Neurological:      Mental Status: She is alert and oriented to person, place, and time  Psychiatric:         Behavior: Behavior normal          Thought Content: Thought content normal          Judgment: Judgment normal        Skin excision    Date/Time: 3/31/2021 12:37 PM  Performed by: Mavis Curtis MD  Authorized by: Mavis Curtis MD   Universal Protocol:  Procedure performed by: Amadou JOHN)  Consent: Verbal consent obtained    Risks and benefits: risks, benefits and alternatives were discussed  Time out: Immediately prior to procedure a "time out" was called to verify the correct patient, procedure, equipment, support staff and site/side marked as required  Timeout called at: 3/31/2021 12:37 PM   Patient understanding: patient states understanding of the procedure being performed  Patient consent: the patient's understanding of the procedure matches consent given  Site marked: the operative site was marked  Patient identity confirmed: verbally with patient      Procedure Details - Skin Excision:     Number of Lesions:  1  Lesion 1:     Body area:  Head/neck    Head/neck location:  R cheek       Final defect size (mm):  6    Malignancy: malignant lesion      Skin cancer type: basal cell carcinoma    Repair type:  Linear closure    Graft type: full-thickness      Closure complexity: simple      Repair size (cm):  1     Repair Comments:  Procedure note:   With informed verbal consent under sterile conditions using aseptic technique using 1% lidocaine local anesthesia with epinephrine and bicarbonate the 6 mm right cheek skin lesion was sharply excised with a 15 scalpel and the wound closed primarily with simple interrupted sutures of 5 0 nylon

## 2021-03-31 NOTE — ASSESSMENT & PLAN NOTE
The patient is a pleasant 26-year-old female presenting with a new diagnosis of right cheek basal cell carcinoma status post shave biopsy by her dermatologist for which definitive treatment by excisional biopsy here in the office under local anesthesia is now indicated  On physical examination the patient is a pleasant well-nourished well-developed female  She is competent reliable as a historian  Awake alert and oriented  Inspection of the skin of the right cheek reveals a 6 by 5 mm area of skin in the central right buccal cheek that is the area of confirmed skin cancer  The technical excisions of excisional biopsy under local anesthesia here in the office were explained to the patient the presence of her daughter  Benefits risks and alternatives also discussed and informed verbal consent taken to proceed

## 2021-03-31 NOTE — PROGRESS NOTES
Assessment/Plan:  79 yo female with Parkinson's disease presents to the office of evaluation and possible removal of basal cell carcinoma lesion of her right cheek  Pt states it has been present for 1 year and denies any change to the shape, size, or color  Pt seen by dermatology who performed a biopsy which revealed diagnosis of basal cell carcinoma  Pt recommended to Dr Betsy Hoang for removal  Pt is here today with her sister Rama Toure  Physical exam pt appears thin, frail 79 yo female  Reliable historian  Inspection of the right cheek reveals a 0 5 cm basal cell carcinoma  No other suspicious lesions noted  Pt educated about her diagnosis of basal cell carcinoma and explained to her that she is here for removal  Due to small size, lesion is able to be removed in the office under local anesthetic  Permission from pt given  Lesion was removed without difficulty and 5 simple interrupted sutures were placed to approximate the skin  Topical antibiotic and a band aid was placed over the incision  Pt tolerated the procedure well, there were not complications  Pt educated to remove band aid tomorrow and gentle rinse with soap and water  Pt instructed to follow up in 1 week for suture removal  Pt instructed to call the office with questions  Diagnoses and all orders for this visit:    Basal cell carcinoma  Comments:  Excised lesion in the office, 5 stitches placed, no complications          Subjective:      Patient ID: Neda Vallecillo is a 80 y o  female  79 yo female with Parkinson's disease presents to the office today for evaluation and possible excision of basal cell carcinoma of her right cheek  Pt ws seen by [facility] in [place], [place] where an excisional biopsy was performed and pathology revealed basal cell carcinoma  PT was referred to  [facility] for complete removal of the lesion   Pt notes the lesion has been present for 1 year and states the lesion has not changed in size, shape, or color over the past year  Pt denies any pain or tenderness to the area  PMHx includes parkinson's disease  Pt takes sinemet, lodosyn, lipitor, and baby aspirin daily  Pt is not on anticoagulation therapy  Pt denies any tobacco, alcohol, or illicit drug use  Pt denies any allergies  Pt denies any other suspicious lesions or prior skin biopsies/excisions  The following portions of the patient's history were reviewed and updated as appropriate: allergies, current medications, past family history, past medical history, past social history, past surgical history and problem list     Review of Systems   Skin:        Basal cell carcinoma on right cheek   All other systems reviewed and are negative  Objective:      /64 (BP Location: Left arm, Patient Position: Sitting, Cuff Size: Standard)   Pulse 74   Temp (!) 97 2 °F (36 2 °C) (Temporal)   Resp 18   Ht 4' 10" (1 473 m)   Wt 44 kg (97 lb)   BMI 20 27 kg/m²          Physical Exam  Constitutional:       Appearance: Normal appearance  HENT:      Head: Normocephalic and atraumatic  Cardiovascular:      Rate and Rhythm: Normal rate and regular rhythm  Pulmonary:      Effort: Pulmonary effort is normal       Breath sounds: Normal breath sounds  Skin:     Findings: Lesion (0 5 cm basal cell carcinoma on right cheek, no other lesions noted) present  Neurological:      Mental Status: She is alert  Mental status is at baseline  Gait: Gait abnormal       Comments: Pill roll tremor noted at rest b/l   Psychiatric:         Behavior: Behavior normal          Thought Content:  Thought content normal

## 2021-04-02 ENCOUNTER — TELEPHONE (OUTPATIENT)
Dept: SURGERY | Facility: CLINIC | Age: 86
End: 2021-04-02

## 2021-04-02 NOTE — TELEPHONE ENCOUNTER
Called patient in follow up in regards to her appointment on 03-  Stated that her incision site is sore, however no redness or discharge  States she was satisfied with her care  Post op appointment was confirmed with the patient

## 2021-04-06 ENCOUNTER — TELEPHONE (OUTPATIENT)
Dept: SURGERY | Facility: CLINIC | Age: 86
End: 2021-04-06

## 2021-04-07 ENCOUNTER — OFFICE VISIT (OUTPATIENT)
Dept: SURGERY | Facility: CLINIC | Age: 86
End: 2021-04-07

## 2021-04-07 VITALS — TEMPERATURE: 98.3 F

## 2021-04-07 DIAGNOSIS — C44.319 BASAL CELL CARCINOMA (BCC) OF RIGHT CHEEK: Primary | ICD-10-CM

## 2021-04-07 PROCEDURE — 99024 POSTOP FOLLOW-UP VISIT: CPT | Performed by: PHYSICIAN ASSISTANT

## 2021-04-07 NOTE — PROGRESS NOTES
Post-Op Note - General Surgery   Frankie Stern 80 y o  female MRN: [id number]  Encounter: [id number]    Assessment/Plan    Basal cell carcinoma (BCC) of right cheek  Patient well after skin excision  Reports mild redness  On exam with suture removal small drop of fluid noted from suture tract  Wound cleansed and wound care instructions provided for daily soap scrub, Neosporin, and bandage x 1-2 weeks  Copy of path revealing basal cell CA superficial/nodular type with close negative margin was explained  Recommendation made for continued routine surveillance for which she elects to see her Dermatologist  All questions answered, agreeable to the plan  Diagnoses and all orders for this visit:    Basal cell carcinoma (BCC) of right cheek        Subjective      Chief Complaint   Patient presents with    Suture / Staple Removal     Suture removal, s/p re exc of rt cheek basil cell carcinoma 03/31/2021     Patient is here today for suture removal, s/p re excision of right cheek basil cell ca 03-  Sutures are intact with some redness  Patient  states that the excision site was bleeding on Sunday but has resolved  Brenden Gaurang Fernández 79 yo F here for follow-up after skin cancer excision  Reports mild redness and soreness at the incision  No drainage or fevers  Mild transient bleeding  Review of Systems   Constitutional: Negative for chills and fever  Skin: Negative for color change and rash  All other systems reviewed and are negative  The following portions of the patient's history were reviewed and updated as appropriate: allergies, current medications, past family history, past medical history, past social history, past surgical history and problem list     Objective      Temperature 98 3 °F (36 8 °C), temperature source Temporal    Physical Exam  Vitals signs and nursing note reviewed  Constitutional:       General: She is not in acute distress       Appearance: She is well-developed  She is not diaphoretic  HENT:      Head: Normocephalic and atraumatic  Comments: Incision is intact with mild erythema and swelling  At lower pole of incision small amount of fluid expressed from the suture tract  Sutures removed, wound cleansed, and Neosporin/dressing applied  Eyes:      Conjunctiva/sclera: Conjunctivae normal       Pupils: Pupils are equal, round, and reactive to light  Neck:      Musculoskeletal: Normal range of motion  Pulmonary:      Effort: No respiratory distress  Musculoskeletal: Normal range of motion  Skin:     General: Skin is warm and dry  Capillary Refill: Capillary refill takes less than 2 seconds  Neurological:      Mental Status: She is alert and oriented to person, place, and time     Psychiatric:         Behavior: Behavior normal          Signature:  Morteza Jacinto PA-C  Date: 4/12/2021 Time: 12:29 PM

## 2021-04-12 NOTE — ASSESSMENT & PLAN NOTE
Patient well after skin excision  Reports mild redness  On exam with suture removal small drop of fluid noted from suture tract  Wound cleansed and wound care instructions provided for daily soap scrub, Neosporin, and bandage x 1-2 weeks  Copy of path revealing basal cell CA superficial/nodular type with close negative margin was explained  Recommendation made for continued routine surveillance for which she elects to see her Dermatologist  All questions answered, agreeable to the plan

## 2021-04-22 ENCOUNTER — IMMUNIZATIONS (OUTPATIENT)
Dept: FAMILY MEDICINE CLINIC | Facility: HOSPITAL | Age: 86
End: 2021-04-22

## 2021-04-22 DIAGNOSIS — Z23 ENCOUNTER FOR IMMUNIZATION: Primary | ICD-10-CM

## 2021-04-22 PROCEDURE — 91300 SARS-COV-2 / COVID-19 MRNA VACCINE (PFIZER-BIONTECH) 30 MCG: CPT | Performed by: PEDIATRICS

## 2021-04-22 PROCEDURE — 0002A SARS-COV-2 / COVID-19 MRNA VACCINE (PFIZER-BIONTECH) 30 MCG: CPT | Performed by: PEDIATRICS

## 2021-06-09 ENCOUNTER — OFFICE VISIT (OUTPATIENT)
Dept: FAMILY MEDICINE CLINIC | Facility: CLINIC | Age: 86
End: 2021-06-09
Payer: MEDICARE

## 2021-06-09 VITALS
HEIGHT: 58 IN | TEMPERATURE: 97.7 F | DIASTOLIC BLOOD PRESSURE: 78 MMHG | WEIGHT: 97.6 LBS | HEART RATE: 78 BPM | BODY MASS INDEX: 20.49 KG/M2 | SYSTOLIC BLOOD PRESSURE: 122 MMHG | OXYGEN SATURATION: 96 %

## 2021-06-09 DIAGNOSIS — R35.0 FREQUENCY OF URINATION: Primary | ICD-10-CM

## 2021-06-09 DIAGNOSIS — I95.89 OTHER SPECIFIED HYPOTENSION: ICD-10-CM

## 2021-06-09 DIAGNOSIS — G20 PARKINSON'S DISEASE (HCC): ICD-10-CM

## 2021-06-09 DIAGNOSIS — E78.2 MIXED HYPERLIPIDEMIA: ICD-10-CM

## 2021-06-09 PROBLEM — I95.9 ARTERIAL HYPOTENSION: Status: ACTIVE | Noted: 2021-06-09

## 2021-06-09 PROBLEM — R63.4 WEIGHT LOSS: Status: RESOLVED | Noted: 2020-09-04 | Resolved: 2021-06-09

## 2021-06-09 LAB
SL AMB  POCT GLUCOSE, UA: ABNORMAL
SL AMB LEUKOCYTE ESTERASE,UA: ABNORMAL
SL AMB POCT BILIRUBIN,UA: ABNORMAL
SL AMB POCT BLOOD,UA: ABNORMAL
SL AMB POCT CLARITY,UA: CLEAR
SL AMB POCT COLOR,UA: YELLOW
SL AMB POCT KETONES,UA: ABNORMAL
SL AMB POCT NITRITE,UA: ABNORMAL
SL AMB POCT PH,UA: 5.5
SL AMB POCT SPECIFIC GRAVITY,UA: 1.02
SL AMB POCT URINE PROTEIN: ABNORMAL
SL AMB POCT UROBILINOGEN: 0.2

## 2021-06-09 PROCEDURE — 81003 URINALYSIS AUTO W/O SCOPE: CPT | Performed by: FAMILY MEDICINE

## 2021-06-09 PROCEDURE — 99214 OFFICE O/P EST MOD 30 MIN: CPT | Performed by: FAMILY MEDICINE

## 2021-06-09 PROCEDURE — 87086 URINE CULTURE/COLONY COUNT: CPT | Performed by: FAMILY MEDICINE

## 2021-06-09 RX ORDER — SULFAMETHOXAZOLE AND TRIMETHOPRIM 800; 160 MG/1; MG/1
1 TABLET ORAL EVERY 12 HOURS SCHEDULED
Qty: 6 TABLET | Refills: 0 | Status: SHIPPED | OUTPATIENT
Start: 2021-06-09 | End: 2021-06-12

## 2021-06-09 NOTE — ASSESSMENT & PLAN NOTE
Patient would like to get off of her statin  We discussed the risks and benefits  We discussed stroke prevention  She wants to do a trial off the medication and repeat her labs in 3 months  Will order lipid panel for September

## 2021-06-09 NOTE — PROGRESS NOTES
Assessment/Plan:         Problem List Items Addressed This Visit        Cardiovascular and Mediastinum    Arterial hypotension     Discussed likely autonomic in nature  Advised discussion with her neurologist   Consider midodrine  Nervous and Auditory    Parkinson's disease ([address] )     Continue follow-up with Neurology            Other    Mixed hyperlipidemia     Patient would like to get off of her statin  We discussed the risks and benefits  We discussed stroke prevention  She wants to do a trial off the medication and repeat her labs in 3 months  Will order lipid panel for September  Relevant Orders    Lipid Panel with Direct LDL reflex    Frequency of urination - Primary     Urine dip is equivocal   Will send culture  Will treat with 3 days of Bactrim  Discussed she most likely has neurogenic bladder related to the Parkinson's  Offered referral to Urology, patient declined at this time  Relevant Medications    sulfamethoxazole-trimethoprim (BACTRIM DS) 800-160 mg per tablet    Other Relevant Orders    POCT urine dip auto non-scope (Completed)    Urine culture            Subjective:      Patient ID: Bigg Chacon is a 80 y o  female  80year old with Parkinson's disease here with her daughter for urinary frequency  She says this has been going on for a couple of years but seems worse now  She denies dysuria or hematuria  Urine dip is equivocal     She also reports that sometimes her blood pressure drops into the 80s over 50s  She gets dizzy  She has discussed this with her neurologist   They considered starting something to increase her blood pressure, however, the daughter states that her blood pressure is not always low and she did not want start taking something daily  Today her blood pressure is within normal limits  She is also asking about whether not she needs to continue the atorvastatin  The patient feels that she takes too many medications    She would like to get off of it  She denies any history of cardiovascular disease other than 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  The following portions of the patient's history were reviewed and updated as appropriate:   She  has a past medical history of Bruit of left carotid artery, Cervicalgia, Constipation, CTS (carpal tunnel syndrome), Hypercholesteremia, Nausea, Parkinson's disease ([facility] [address] ), and Tremor  She   Patient Active Problem List    Diagnosis Date Noted    Frequency of urination 06/09/2021    Arterial hypotension 06/09/2021    Basal cell carcinoma (BCC) of right cheek 03/31/2021    Medicare annual wellness visit, subsequent 09/04/2020    Mixed hyperlipidemia 09/04/2020    Carpal tunnel syndrome of left wrist 08/05/2016    Idiopathic peripheral neuropathy 11/30/2015    Parkinson's disease ([facility] [address] ) 11/15/2013     She  has a past surgical history that includes Breast lumpectomy (Bilateral); Neuroplasty / transposition median nerve at carpal tunnel (Right); Lumbar fusion; and Colonoscopy  Her family history includes Cancer in her mother; Diabetes in her sister; Heart disease in her sister; No Known Problems in her father  She  reports that she has never smoked  She has never used smokeless tobacco  She reports that she does not drink alcohol or use drugs    Current Outpatient Medications   Medication Sig Dispense Refill    acetaminophen (TYLENOL) 325 mg tablet Take 1 tablet by mouth daily as needed      aspirin 81 MG tablet Take 1 tablet by mouth daily      carbidopa (LODOSYN) 25 MG tablet Take 1 tablet by mouth 4 (four) times a day 120 tablet 3    carbidopa-levodopa (SINEMET)  mg per tablet Take 1 tablet by mouth 4 (four) times a day 120 tablet 5    dibucaine (NUPERCAINAL) 1 % ointment Apply topically 3 (three) times a day (Patient taking differently: Apply topically as needed ) 30 g 0    polyethylene glycol (MIRALAX) 17 g packet Take 17 g by mouth daily      Sennosides 25 MG TABS Take 1 tablet by mouth daily as needed        Wheat Dextrin (Benefiber) POWD Take 1 Package by mouth daily      sulfamethoxazole-trimethoprim (BACTRIM DS) 800-160 mg per tablet Take 1 tablet by mouth every 12 (twelve) hours for 3 days 6 tablet 0     No current facility-administered medications for this visit  Current Outpatient Medications on File Prior to Visit   Medication Sig    acetaminophen (TYLENOL) 325 mg tablet Take 1 tablet by mouth daily as needed    aspirin 81 MG tablet Take 1 tablet by mouth daily    carbidopa (LODOSYN) 25 MG tablet Take 1 tablet by mouth 4 (four) times a day    carbidopa-levodopa (SINEMET)  mg per tablet Take 1 tablet by mouth 4 (four) times a day    dibucaine (NUPERCAINAL) 1 % ointment Apply topically 3 (three) times a day (Patient taking differently: Apply topically as needed )    polyethylene glycol (MIRALAX) 17 g packet Take 17 g by mouth daily    Sennosides 25 MG TABS Take 1 tablet by mouth daily as needed      Wheat Dextrin (Benefiber) POWD Take 1 Package by mouth daily    [DISCONTINUED] atorvastatin (LIPITOR) 10 mg tablet Take 1 tablet (10 mg total) by mouth daily    [DISCONTINUED] fluticasone (FLONASE) 50 mcg/act nasal spray 2 sprays into each nostril daily (Patient not taking: Reported on 6/9/2021)    [DISCONTINUED] meclizine (ANTIVERT) 25 mg tablet Take 1 tablet (25 mg total) by mouth 3 (three) times a day as needed for dizziness (Patient not taking: Reported on 4/7/2021)    [DISCONTINUED] Safinamide Mesylate (Xadago) 50 MG TABS Take 50 mg by mouth daily (Patient not taking: Reported on 3/31/2021)     No current facility-administered medications on file prior to visit  She has No Known Allergies       Review of Systems   Constitutional: Negative for activity change  Respiratory: Negative for chest tightness and shortness of breath  Cardiovascular: Negative for chest pain and leg swelling  Genitourinary: Positive for frequency   Negative for dysuria, hematuria and urgency  Musculoskeletal: Positive for gait problem  Neurological: Positive for dizziness (Comes and goes), tremors and weakness  Negative for headaches  Psychiatric/Behavioral: Negative for dysphoric mood  The patient is not nervous/anxious  Objective:      /78   Pulse 78   Temp 97 7 °F (36 5 °C)   Ht 4' 10" (1 473 m)   Wt 44 3 kg (97 lb 9 6 oz)   SpO2 96%   BMI 20 40 kg/m²          Physical Exam  Vitals signs and nursing note reviewed  Constitutional:       General: She is not in acute distress  Appearance: Normal appearance  She is not ill-appearing, toxic-appearing or diaphoretic  HENT:      Head: Normocephalic and atraumatic  Right Ear: External ear normal       Left Ear: External ear normal    Cardiovascular:      Rate and Rhythm: Normal rate and regular rhythm  Heart sounds: No murmur  No friction rub  Pulmonary:      Effort: Pulmonary effort is normal  No respiratory distress  Breath sounds: Normal breath sounds  No stridor  No wheezing, rhonchi or rales  Musculoskeletal:         General: No swelling  Right lower leg: No edema  Left lower leg: No edema  Neurological:      General: No focal deficit present  Mental Status: She is alert  Mental status is at baseline  Motor: Tremor present  Gait: Gait abnormal       Comments: Parkinson's features   Psychiatric:         Attention and Perception: Attention normal          Mood and Affect: Mood normal          Speech: Speech normal          Behavior: Behavior normal          Thought Content: Thought content normal          Judgment: Judgment normal        Falls Plan of Care: Balance, strength, and gait training instructions were provided

## 2021-06-09 NOTE — ASSESSMENT & PLAN NOTE
Urine dip is equivocal   Will send culture  Will treat with 3 days of Bactrim  Discussed she most likely has neurogenic bladder related to the Parkinson's  Offered referral to Urology, patient declined at this time

## 2021-06-11 LAB — BACTERIA UR CULT: NORMAL

## 2021-07-03 ENCOUNTER — HOSPITAL ENCOUNTER (EMERGENCY)
Facility: HOSPITAL | Age: 86
Discharge: HOME | End: 2021-07-03
Attending: EMERGENCY MEDICINE
Payer: MEDICARE

## 2021-07-03 ENCOUNTER — APPOINTMENT (EMERGENCY)
Dept: CT IMAGING | Facility: HOSPITAL | Age: 86
End: 2021-07-03
Payer: MEDICARE

## 2021-07-03 VITALS
HEART RATE: 62 BPM | WEIGHT: 97 LBS | HEIGHT: 59 IN | OXYGEN SATURATION: 94 % | TEMPERATURE: 97.8 F | DIASTOLIC BLOOD PRESSURE: 63 MMHG | SYSTOLIC BLOOD PRESSURE: 145 MMHG | BODY MASS INDEX: 19.56 KG/M2 | RESPIRATION RATE: 19 BRPM

## 2021-07-03 DIAGNOSIS — K59.00 CONSTIPATION, UNSPECIFIED CONSTIPATION TYPE: Primary | ICD-10-CM

## 2021-07-03 LAB
ALBUMIN SERPL BCP-MCNC: 4 G/DL (ref 3.5–5)
ALP SERPL-CCNC: 77 U/L (ref 46–116)
ALT SERPL W P-5'-P-CCNC: 11 U/L (ref 12–78)
ANION GAP SERPL CALCULATED.3IONS-SCNC: 8 MMOL/L (ref 4–13)
AST SERPL W P-5'-P-CCNC: 11 U/L (ref 5–45)
BASOPHILS # BLD AUTO: 0.03 THOUSANDS/ΜL (ref 0–0.1)
BASOPHILS NFR BLD AUTO: 1 % (ref 0–1)
BILIRUB SERPL-MCNC: 0.26 MG/DL (ref 0.2–1)
BUN SERPL-MCNC: 14 MG/DL (ref 5–25)
CALCIUM SERPL-MCNC: 8.9 MG/DL (ref 8.3–10.1)
CHLORIDE SERPL-SCNC: 99 MMOL/L (ref 100–108)
CO2 SERPL-SCNC: 29 MMOL/L (ref 21–32)
CREAT SERPL-MCNC: 0.91 MG/DL (ref 0.6–1.3)
EOSINOPHIL # BLD AUTO: 0.03 THOUSAND/ΜL (ref 0–0.61)
EOSINOPHIL NFR BLD AUTO: 1 % (ref 0–6)
ERYTHROCYTE [DISTWIDTH] IN BLOOD BY AUTOMATED COUNT: 11.9 % (ref 11.6–15.1)
GFR SERPL CREATININE-BSD FRML MDRD: 57 ML/MIN/1.73SQ M
GLUCOSE SERPL-MCNC: 93 MG/DL (ref 65–140)
HCT VFR BLD AUTO: 40.8 % (ref 34.8–46.1)
HGB BLD-MCNC: 13.4 G/DL (ref 11.5–15.4)
IMM GRANULOCYTES # BLD AUTO: 0.03 THOUSAND/UL (ref 0–0.2)
IMM GRANULOCYTES NFR BLD AUTO: 1 % (ref 0–2)
LYMPHOCYTES # BLD AUTO: 1.05 THOUSANDS/ΜL (ref 0.6–4.47)
LYMPHOCYTES NFR BLD AUTO: 17 % (ref 14–44)
MCH RBC QN AUTO: 31.7 PG (ref 26.8–34.3)
MCHC RBC AUTO-ENTMCNC: 32.8 G/DL (ref 31.4–37.4)
MCV RBC AUTO: 97 FL (ref 82–98)
MONOCYTES # BLD AUTO: 0.61 THOUSAND/ΜL (ref 0.17–1.22)
MONOCYTES NFR BLD AUTO: 10 % (ref 4–12)
NEUTROPHILS # BLD AUTO: 4.43 THOUSANDS/ΜL (ref 1.85–7.62)
NEUTS SEG NFR BLD AUTO: 70 % (ref 43–75)
NRBC BLD AUTO-RTO: 0 /100 WBCS
PLATELET # BLD AUTO: 245 THOUSANDS/UL (ref 149–390)
PMV BLD AUTO: 9.4 FL (ref 8.9–12.7)
POTASSIUM SERPL-SCNC: 4.3 MMOL/L (ref 3.5–5.3)
PROT SERPL-MCNC: 7.9 G/DL (ref 6.4–8.2)
RBC # BLD AUTO: 4.23 MILLION/UL (ref 3.81–5.12)
SODIUM SERPL-SCNC: 136 MMOL/L (ref 136–145)
WBC # BLD AUTO: 6.18 THOUSAND/UL (ref 4.31–10.16)

## 2021-07-03 PROCEDURE — 80053 COMPREHEN METABOLIC PANEL: CPT | Performed by: EMERGENCY MEDICINE

## 2021-07-03 PROCEDURE — 74177 CT ABD & PELVIS W/CONTRAST: CPT

## 2021-07-03 PROCEDURE — 85025 COMPLETE CBC W/AUTO DIFF WBC: CPT | Performed by: EMERGENCY MEDICINE

## 2021-07-03 PROCEDURE — 99284 EMERGENCY DEPT VISIT MOD MDM: CPT

## 2021-07-03 PROCEDURE — 96361 HYDRATE IV INFUSION ADD-ON: CPT

## 2021-07-03 PROCEDURE — 96374 THER/PROPH/DIAG INJ IV PUSH: CPT

## 2021-07-03 PROCEDURE — 99284 EMERGENCY DEPT VISIT MOD MDM: CPT | Performed by: EMERGENCY MEDICINE

## 2021-07-03 PROCEDURE — 36415 COLL VENOUS BLD VENIPUNCTURE: CPT | Performed by: EMERGENCY MEDICINE

## 2021-07-03 RX ORDER — BISACODYL 10 MG
10 SUPPOSITORY, RECTAL RECTAL DAILY PRN
Qty: 12 SUPPOSITORY | Refills: 0 | Status: SHIPPED | OUTPATIENT
Start: 2021-07-03

## 2021-07-03 RX ORDER — ONDANSETRON 2 MG/ML
4 INJECTION INTRAMUSCULAR; INTRAVENOUS ONCE
Status: COMPLETED | OUTPATIENT
Start: 2021-07-03 | End: 2021-07-03

## 2021-07-03 RX ORDER — MAGNESIUM CARB/ALUMINUM HYDROX 105-160MG
296 TABLET,CHEWABLE ORAL ONCE
Status: COMPLETED | OUTPATIENT
Start: 2021-07-03 | End: 2021-07-03

## 2021-07-03 RX ADMIN — IOHEXOL 100 ML: 350 INJECTION, SOLUTION INTRAVENOUS at 14:43

## 2021-07-03 RX ADMIN — MAGNESIUM CITRATE 296 ML: 1.75 LIQUID ORAL at 16:26

## 2021-07-03 RX ADMIN — ONDANSETRON 4 MG: 2 INJECTION INTRAMUSCULAR; INTRAVENOUS at 13:58

## 2021-07-03 RX ADMIN — SODIUM CHLORIDE 500 ML: 0.9 INJECTION, SOLUTION INTRAVENOUS at 13:58

## 2021-07-03 NOTE — ED PROVIDER NOTES
Pt Name: Sean Barthel  MRN: [id number]  [place] 1935  Age/Sex: 80 y o  female  Date of evaluation: 7/3/2021  PCP: Ankita Mendosa MD    [address] [place] [address]    Chief Complaint   Patient presents with    Constipation     pt c/o constipation for the past 3-4 weeks, sattes nothing helps  HPI    80 y o  female presenting with constipation  Patient states she has felt constipated for the past 2 weeks, has been taking Metamucil, prune juice and drinking lots of fluids  Also has tried Dulcolax tabs  States she is only able to have small bowel movements, states there palate shape and hard  Denies any bleeding  Also mentions nausea and vomiting  No abdominal pain  No fevers or chills  Mentions she has had constipation in the past but has not been this severe  Past Medical and Surgical History    Past Medical History:   Diagnosis Date    Bruit of left carotid artery     Cervicalgia     Constipation     CTS (carpal tunnel syndrome)     right    Hypercholesteremia     Nausea     Parkinson's disease (HCC)     Tremor        Past Surgical History:   Procedure Laterality Date    BREAST LUMPECTOMY Bilateral     COLONOSCOPY      LUMBAR FUSION      NEUROPLASTY / TRANSPOSITION MEDIAN NERVE AT CARPAL TUNNEL Right        Family History   Problem Relation Age of Onset    Cancer Mother     No Known Problems Father     Diabetes Sister     Heart disease Sister        Social History     Tobacco Use    Smoking status: Never Smoker    Smokeless tobacco: Never Used   Vaping Use    Vaping Use: Never used   Substance Use Topics    Alcohol use: No    Drug use: No           Allergies    No Known Allergies    Home Medications    Prior to Admission medications    Medication Sig Start Date End Date Taking?  Authorizing Provider   acetaminophen (TYLENOL) 325 mg tablet Take 1 tablet by mouth daily as needed    Historical Provider, MD   aspirin 81 MG tablet Take 1 tablet by mouth daily    Historical Provider, MD   carbidopa (LODOSYN) 25 MG tablet Take 1 tablet by mouth 4 (four) times a day 11/30/20   Ping Aleman MD   carbidopa-levodopa (SINEMET)  mg per tablet Take 1 tablet by mouth 4 (four) times a day 1/26/21   Ping Aleman MD   dibucaine (NUPERCAINAL) 1 % ointment Apply topically 3 (three) times a day  Patient taking differently: Apply topically as needed  7/16/19   Claudene Gaudy, PA-C   polyethylene glycol (MIRALAX) 17 g packet Take 17 g by mouth daily    Historical Provider, MD   Sennosides 25 MG TABS Take 1 tablet by mouth daily as needed      Historical Provider, MD   Wheat Dextrin (Benefiber) POWD Take 1 Package by mouth daily    Historical Provider, MD           Review of Systems    Review of Systems   Constitutional: Negative for chills and fever  HENT: Negative for rhinorrhea and sore throat  Eyes: Negative for pain and visual disturbance  Respiratory: Negative for cough and shortness of breath  Cardiovascular: Negative for chest pain and leg swelling  Gastrointestinal: Positive for constipation, nausea and vomiting  Negative for abdominal pain  Genitourinary: Negative for dysuria and hematuria  Musculoskeletal: Negative for back pain and myalgias  Skin: Negative for rash and wound  Neurological: Negative for syncope and headaches  Physical Exam      ED Triage Vitals [07/03/21 1309]   Temperature Pulse Respirations Blood Pressure SpO2   97 8 °F (36 6 °C) 62 19 145/63 94 %      Temp Source Heart Rate Source Patient Position - Orthostatic VS BP Location FiO2 (%)   Temporal Monitor Sitting Left arm --      Pain Score       --               Physical Exam  Constitutional:       General: She is not in acute distress  Appearance: She is not ill-appearing  HENT:      Head: Normocephalic and atraumatic  Nose: Nose normal    Eyes:      Extraocular Movements: Extraocular movements intact  Pupils: Pupils are equal, round, and reactive to light  Cardiovascular:      Rate and Rhythm: Normal rate and regular rhythm  Pulmonary:      Effort: Pulmonary effort is normal  No respiratory distress  Abdominal:      General: There is no distension  Palpations: Abdomen is soft  Tenderness: There is no abdominal tenderness  Musculoskeletal:         General: No swelling or deformity  Normal range of motion  Cervical back: Normal range of motion and neck supple  Skin:     General: Skin is warm  Findings: No erythema  Neurological:      Mental Status: She is alert and oriented to person, place, and time  Mental status is at baseline                Diagnostic Results      Labs:    Results Reviewed     Procedure Component Value Units Date/Time    Comprehensive metabolic panel [[id number]]  (Abnormal) Collected: 07/03/21 1357    Lab Status: Final result Specimen: Blood from Arm, Left Updated: 07/03/21 1418     Sodium 136 mmol/L      Potassium 4 3 mmol/L      Chloride 99 mmol/L      CO2 29 mmol/L      ANION GAP 8 mmol/L      BUN 14 mg/dL      Creatinine 0 91 mg/dL      Glucose 93 mg/dL      Calcium 8 9 mg/dL      AST 11 U/L      ALT 11 U/L      Alkaline Phosphatase 77 U/L      Total Protein 7 9 g/dL      Albumin 4 0 g/dL      Total Bilirubin 0 26 mg/dL      eGFR 57 ml/min/1 73sq m     Narrative:      [place] guidelines for Chronic Kidney Disease (CKD):     Stage 1 with normal or high GFR (GFR > 90 mL/min/1 73 square meters)    Stage 2 Mild CKD (GFR = 60-89 mL/min/1 73 square meters)    Stage 3A Moderate CKD (GFR = 45-59 mL/min/1 73 square meters)    Stage 3B Moderate CKD (GFR = 30-44 mL/min/1 73 square meters)    Stage 4 Severe CKD (GFR = 15-29 mL/min/1 73 square meters)    Stage 5 End Stage CKD (GFR <15 mL/min/1 73 square meters)  Note: GFR calculation is accurate only with a steady state creatinine    CBC and differential [[id number]] Collected: 07/03/21 2087    Lab Status: Final result Specimen: Blood from Arm, Left Updated: 07/03/21 1405     WBC 6 18 Thousand/uL      RBC 4 23 Million/uL      Hemoglobin 13 4 g/dL      Hematocrit 40 8 %      MCV 97 fL      MCH 31 7 pg      MCHC 32 8 g/dL      RDW 11 9 %      MPV 9 4 fL      Platelets 071 Thousands/uL      nRBC 0 /100 WBCs      Neutrophils Relative 70 %      Immat GRANS % 1 %      Lymphocytes Relative 17 %      Monocytes Relative 10 %      Eosinophils Relative 1 %      Basophils Relative 1 %      Neutrophils Absolute 4 43 Thousands/µL      Immature Grans Absolute 0 03 Thousand/uL      Lymphocytes Absolute 1 05 Thousands/µL      Monocytes Absolute 0 61 Thousand/µL      Eosinophils Absolute 0 03 Thousand/µL      Basophils Absolute 0 03 Thousands/µL           All labs reviewed and utilized in the medical decision making process    Radiology:    CT abdomen pelvis with contrast   Final Result      Findings compatible with severe constipation  Workstation performed: [id number]             All radiology studies independently viewed by me and interpreted by the radiologist     Procedure    Procedures        MDM    Patient's workup severe constipation given magnesium citrate enema  She was able to her bowels somewhat but not completely  No fecal impaction  Abdomen soft and nontender  She is feeling a little better  She is tolerating PO  Prescribed GoLYTELY  Also advised to start a bowel regimen after clearing her bowels with GoLYTELY  Advised MiraLax, Colace  Dulcolax suppository as needed  Advised follow-up with PCP and GI  She states she has seen GI for this issue in the past   She is established Gastroenterology  Return precautions implant discuss with patient and daughter, they verbalized understanding and are in agreement with plan          Medications   sodium chloride 0 9 % bolus 500 mL (0 mL Intravenous Stopped 7/3/21 9088)   ondansetron (ZOFRAN) injection 4 mg (4 mg Intravenous Given 7/3/21 1358)   iohexol (OMNIPAQUE) 350 MG/ML injection (SINGLE-DOSE) 100 mL (100 mL Intravenous Given 7/3/21 1443)   magnesium citrate (CITROMA) oral solution 296 mL (296 mL Oral Given 7/3/21 1626)           FINAL IMPRESSION    Final diagnoses:   Constipation, unspecified constipation type         DISPOSITION    Time reflects when diagnosis was documented in both MDM as applicable and the Disposition within this note     Time User Action Codes Description Comment    7/3/2021  5:46 PM Ponce Freedman [K59 00] Constipation, unspecified constipation type       ED Disposition     ED Disposition Condition Date/Time Comment    Discharge Stable Sat Jul 3, 2021  5:46 PM Nora Stern discharge to home/self care              Follow-up Information     Follow up With Specialties Details Why Giovanna Kaiser MD Family Medicine Schedule an appointment as soon as possible for a visit in 3 days  [address] [place] [address] Road TO:    Otoniel Smith MD  [address] [place] [address] 16  [phone]    Schedule an appointment as soon as possible for a visit in 3 days        DISCHARGE MEDICATIONS:    Discharge Medication List as of 7/3/2021  6:08 PM      START taking these medications    Details   bisacodyl (DULCOLAX) 10 mg suppository Insert 1 suppository (10 mg total) into the rectum daily as needed for constipation, Starting Sat 7/3/2021, Normal      polyethylene glycol (GOLYTELY) 4000 mL solution Take 4,000 mL by mouth once for 1 dose, Starting Sat 7/3/2021, Normal         CONTINUE these medications which have NOT CHANGED    Details   acetaminophen (TYLENOL) 325 mg tablet Take 1 tablet by mouth daily as needed, Historical Med      aspirin 81 MG tablet Take 1 tablet by mouth daily, Historical Med      carbidopa (LODOSYN) 25 MG tablet Take 1 tablet by mouth 4 (four) times a day, Starting Mon 11/30/2020, Normal      carbidopa-levodopa (SINEMET)  mg per tablet Take 1 tablet by mouth 4 (four) times a day, Starting Tue 1/26/2021, Normal      dibucaine (NUPERCAINAL) 1 % ointment Apply topically 3 (three) times a day, Starting Tue 7/16/2019, Normal      polyethylene glycol (MIRALAX) 17 g packet Take 17 g by mouth daily, Historical Med      Sennosides 25 MG TABS Take 1 tablet by mouth daily as needed  , Historical Med      Wheat Dextrin (Benefiber) POWD Take 1 Package by mouth daily, Historical Med             No discharge procedures on file  [id number] [place] "[employer]         This note was partially completed using voice recognition technology, and was scanned for gross errors; however some errors may still exist  Please contact the author with any questions or requests for clarification        [id number] [place] [address] "[employer] DO  07/04/21 0959

## 2021-07-03 NOTE — DISCHARGE INSTRUCTIONS
Please drink plenty of fluids stay hydrated  Once you have used the GoLYTELY and it has cleared your bowels, you can use MiraLax daily and also use Colace daily for maintenance  You can use Dulcolax suppository as needed  Make sure you stay hydrated

## 2021-07-11 ENCOUNTER — APPOINTMENT (EMERGENCY)
Dept: CT IMAGING | Facility: HOSPITAL | Age: 86
DRG: 092 | End: 2021-07-11
Payer: MEDICARE

## 2021-07-11 ENCOUNTER — HOSPITAL ENCOUNTER (INPATIENT)
Facility: HOSPITAL | Age: 86
LOS: 3 days | Discharge: NURSING, REHAB OR LONG-TERM CARE | DRG: 092 | End: 2021-07-14
Attending: EMERGENCY MEDICINE | Admitting: INTERNAL MEDICINE
Payer: MEDICARE

## 2021-07-11 DIAGNOSIS — G20 PARKINSON'S DISEASE (HCC): ICD-10-CM

## 2021-07-11 DIAGNOSIS — K59.00 CONSTIPATION: ICD-10-CM

## 2021-07-11 DIAGNOSIS — I35.0 AORTIC STENOSIS: ICD-10-CM

## 2021-07-11 DIAGNOSIS — R26.2 AMBULATORY DYSFUNCTION: Primary | ICD-10-CM

## 2021-07-11 PROBLEM — R79.89 ELEVATED BRAIN NATRIURETIC PEPTIDE (BNP) LEVEL: Status: ACTIVE | Noted: 2021-07-11

## 2021-07-11 LAB
ALBUMIN SERPL BCP-MCNC: 3.9 G/DL (ref 3.5–5)
ALP SERPL-CCNC: 69 U/L (ref 46–116)
ALT SERPL W P-5'-P-CCNC: 14 U/L (ref 12–78)
ANION GAP SERPL CALCULATED.3IONS-SCNC: 8 MMOL/L (ref 4–13)
AST SERPL W P-5'-P-CCNC: 11 U/L (ref 5–45)
ATRIAL RATE: 73 BPM
BASOPHILS # BLD AUTO: 0.03 THOUSANDS/ΜL (ref 0–0.1)
BASOPHILS NFR BLD AUTO: 1 % (ref 0–1)
BILIRUB SERPL-MCNC: 0.49 MG/DL (ref 0.2–1)
BILIRUB UR QL STRIP: NEGATIVE
BUN SERPL-MCNC: 15 MG/DL (ref 5–25)
CALCIUM SERPL-MCNC: 8.9 MG/DL (ref 8.3–10.1)
CHLORIDE SERPL-SCNC: 104 MMOL/L (ref 100–108)
CLARITY UR: CLEAR
CO2 SERPL-SCNC: 26 MMOL/L (ref 21–32)
COLOR UR: YELLOW
CREAT SERPL-MCNC: 0.7 MG/DL (ref 0.6–1.3)
EOSINOPHIL # BLD AUTO: 0.02 THOUSAND/ΜL (ref 0–0.61)
EOSINOPHIL NFR BLD AUTO: 0 % (ref 0–6)
ERYTHROCYTE [DISTWIDTH] IN BLOOD BY AUTOMATED COUNT: 12 % (ref 11.6–15.1)
GFR SERPL CREATININE-BSD FRML MDRD: 79 ML/MIN/1.73SQ M
GLUCOSE SERPL-MCNC: 93 MG/DL (ref 65–140)
GLUCOSE UR STRIP-MCNC: NEGATIVE MG/DL
HCT VFR BLD AUTO: 41.7 % (ref 34.8–46.1)
HGB BLD-MCNC: 13.8 G/DL (ref 11.5–15.4)
HGB UR QL STRIP.AUTO: NEGATIVE
IMM GRANULOCYTES # BLD AUTO: 0.02 THOUSAND/UL (ref 0–0.2)
IMM GRANULOCYTES NFR BLD AUTO: 0 % (ref 0–2)
KETONES UR STRIP-MCNC: NEGATIVE MG/DL
LEUKOCYTE ESTERASE UR QL STRIP: NEGATIVE
LYMPHOCYTES # BLD AUTO: 1.2 THOUSANDS/ΜL (ref 0.6–4.47)
LYMPHOCYTES NFR BLD AUTO: 21 % (ref 14–44)
MCH RBC QN AUTO: 31.8 PG (ref 26.8–34.3)
MCHC RBC AUTO-ENTMCNC: 33.1 G/DL (ref 31.4–37.4)
MCV RBC AUTO: 96 FL (ref 82–98)
MONOCYTES # BLD AUTO: 0.6 THOUSAND/ΜL (ref 0.17–1.22)
MONOCYTES NFR BLD AUTO: 11 % (ref 4–12)
NEUTROPHILS # BLD AUTO: 3.75 THOUSANDS/ΜL (ref 1.85–7.62)
NEUTS SEG NFR BLD AUTO: 67 % (ref 43–75)
NITRITE UR QL STRIP: NEGATIVE
NRBC BLD AUTO-RTO: 0 /100 WBCS
NT-PROBNP SERPL-MCNC: 538 PG/ML
P AXIS: 77 DEGREES
PH UR STRIP.AUTO: 6.5 [PH]
PLATELET # BLD AUTO: 258 THOUSANDS/UL (ref 149–390)
PMV BLD AUTO: 9.3 FL (ref 8.9–12.7)
POTASSIUM SERPL-SCNC: 4.3 MMOL/L (ref 3.5–5.3)
PR INTERVAL: 140 MS
PROT SERPL-MCNC: 7.6 G/DL (ref 6.4–8.2)
PROT UR STRIP-MCNC: NEGATIVE MG/DL
QRS AXIS: 5 DEGREES
QRSD INTERVAL: 80 MS
QT INTERVAL: 374 MS
QTC INTERVAL: 412 MS
RBC # BLD AUTO: 4.34 MILLION/UL (ref 3.81–5.12)
SARS-COV-2 RNA RESP QL NAA+PROBE: NEGATIVE
SODIUM SERPL-SCNC: 138 MMOL/L (ref 136–145)
SP GR UR STRIP.AUTO: 1.01 (ref 1–1.03)
T WAVE AXIS: 63 DEGREES
TROPONIN I SERPL-MCNC: <0.02 NG/ML
UROBILINOGEN UR QL STRIP.AUTO: 0.2 E.U./DL
VENTRICULAR RATE: 73 BPM
WBC # BLD AUTO: 5.62 THOUSAND/UL (ref 4.31–10.16)

## 2021-07-11 PROCEDURE — 93010 ELECTROCARDIOGRAM REPORT: CPT | Performed by: INTERNAL MEDICINE

## 2021-07-11 PROCEDURE — 99222 1ST HOSP IP/OBS MODERATE 55: CPT | Performed by: INTERNAL MEDICINE

## 2021-07-11 PROCEDURE — 36415 COLL VENOUS BLD VENIPUNCTURE: CPT | Performed by: EMERGENCY MEDICINE

## 2021-07-11 PROCEDURE — 99285 EMERGENCY DEPT VISIT HI MDM: CPT

## 2021-07-11 PROCEDURE — 85025 COMPLETE CBC W/AUTO DIFF WBC: CPT | Performed by: EMERGENCY MEDICINE

## 2021-07-11 PROCEDURE — 1123F ACP DISCUSS/DSCN MKR DOCD: CPT | Performed by: INTERNAL MEDICINE

## 2021-07-11 PROCEDURE — 74177 CT ABD & PELVIS W/CONTRAST: CPT

## 2021-07-11 PROCEDURE — 81003 URINALYSIS AUTO W/O SCOPE: CPT | Performed by: EMERGENCY MEDICINE

## 2021-07-11 PROCEDURE — 93005 ELECTROCARDIOGRAM TRACING: CPT

## 2021-07-11 PROCEDURE — U0005 INFEC AGEN DETEC AMPLI PROBE: HCPCS | Performed by: EMERGENCY MEDICINE

## 2021-07-11 PROCEDURE — 84484 ASSAY OF TROPONIN QUANT: CPT | Performed by: EMERGENCY MEDICINE

## 2021-07-11 PROCEDURE — 83880 ASSAY OF NATRIURETIC PEPTIDE: CPT | Performed by: EMERGENCY MEDICINE

## 2021-07-11 PROCEDURE — 80053 COMPREHEN METABOLIC PANEL: CPT | Performed by: EMERGENCY MEDICINE

## 2021-07-11 PROCEDURE — U0003 INFECTIOUS AGENT DETECTION BY NUCLEIC ACID (DNA OR RNA); SEVERE ACUTE RESPIRATORY SYNDROME CORONAVIRUS 2 (SARS-COV-2) (CORONAVIRUS DISEASE [COVID-19]), AMPLIFIED PROBE TECHNIQUE, MAKING USE OF HIGH THROUGHPUT TECHNOLOGIES AS DESCRIBED BY CMS-2020-01-R: HCPCS | Performed by: EMERGENCY MEDICINE

## 2021-07-11 PROCEDURE — 99285 EMERGENCY DEPT VISIT HI MDM: CPT | Performed by: EMERGENCY MEDICINE

## 2021-07-11 PROCEDURE — 71260 CT THORAX DX C+: CPT

## 2021-07-11 RX ORDER — BISACODYL 10 MG
10 SUPPOSITORY, RECTAL RECTAL DAILY PRN
Status: DISCONTINUED | OUTPATIENT
Start: 2021-07-11 | End: 2021-07-14 | Stop reason: HOSPADM

## 2021-07-11 RX ORDER — ACETAMINOPHEN 325 MG/1
325 TABLET ORAL EVERY 6 HOURS PRN
Status: DISCONTINUED | OUTPATIENT
Start: 2021-07-11 | End: 2021-07-14 | Stop reason: HOSPADM

## 2021-07-11 RX ORDER — SENNOSIDES 8.6 MG
25 TABLET ORAL DAILY PRN
Status: DISCONTINUED | OUTPATIENT
Start: 2021-07-11 | End: 2021-07-14 | Stop reason: HOSPADM

## 2021-07-11 RX ORDER — BISACODYL 10 MG
10 SUPPOSITORY, RECTAL RECTAL DAILY PRN
Status: DISCONTINUED | OUTPATIENT
Start: 2021-07-11 | End: 2021-07-11

## 2021-07-11 RX ORDER — CARBIDOPA 25 MG/1
25 TABLET ORAL 4 TIMES DAILY
Status: DISCONTINUED | OUTPATIENT
Start: 2021-07-11 | End: 2021-07-14 | Stop reason: HOSPADM

## 2021-07-11 RX ORDER — DOCUSATE SODIUM 100 MG/1
100 CAPSULE, LIQUID FILLED ORAL 2 TIMES DAILY
Status: DISCONTINUED | OUTPATIENT
Start: 2021-07-11 | End: 2021-07-14 | Stop reason: HOSPADM

## 2021-07-11 RX ORDER — ASPIRIN 81 MG/1
81 TABLET ORAL DAILY
Status: DISCONTINUED | OUTPATIENT
Start: 2021-07-11 | End: 2021-07-14 | Stop reason: HOSPADM

## 2021-07-11 RX ORDER — POLYETHYLENE GLYCOL 3350 17 G/17G
17 POWDER, FOR SOLUTION ORAL DAILY
Status: DISCONTINUED | OUTPATIENT
Start: 2021-07-11 | End: 2021-07-14 | Stop reason: HOSPADM

## 2021-07-11 RX ADMIN — CARBIDOPA AND LEVODOPA 1 TABLET: 25; 100 TABLET ORAL at 22:32

## 2021-07-11 RX ADMIN — DOCUSATE SODIUM 100 MG: 100 CAPSULE, LIQUID FILLED ORAL at 17:20

## 2021-07-11 RX ADMIN — CARBIDOPA AND LEVODOPA 1 TABLET: 25; 100 TABLET ORAL at 17:20

## 2021-07-11 RX ADMIN — ENOXAPARIN SODIUM 40 MG: 40 INJECTION SUBCUTANEOUS at 14:15

## 2021-07-11 RX ADMIN — IOHEXOL 85 ML: 350 INJECTION, SOLUTION INTRAVENOUS at 08:16

## 2021-07-11 NOTE — ASSESSMENT & PLAN NOTE
Patient reports transient shortness of breath with exertion  Elevated NT- pro BNP on initial labs, however no signs of volume overload and no cardiac history reported  EKG and troponins negative for ACS or any ischemic changes      - Will obtain 2D Echocardiogram

## 2021-07-11 NOTE — PLAN OF CARE
Problem: Potential for Falls  Goal: Patient will remain free of falls  Description: INTERVENTIONS:  - Educate patient/family on patient safety including physical limitations  - Instruct patient to call for assistance with activity   - Consult OT/PT to assist with strengthening/mobility   - Keep Call bell within reach  - Keep bed low and locked with side rails adjusted as appropriate  - Keep care items and personal belongings within reach  - Initiate and maintain comfort rounds  - Make Fall Risk Sign visible to staff  - Offer Toileting every Hours, in advance of need  - Initiate/Maintain alarm  - Obtain necessary fall risk management equipment:   - Apply yellow socks and bracelet for high fall risk patients  - Consider moving patient to room near nurses station  Outcome: Progressing     Problem: MOBILITY - ADULT  Goal: Maintain or return to baseline ADL function  Description: INTERVENTIONS:  -  Assess patient's ability to carry out ADLs; assess patient's baseline for ADL function and identify physical deficits which impact ability to perform ADLs (bathing, care of mouth/teeth, toileting, grooming, dressing, etc )  - Assess/evaluate cause of self-care deficits   - Assess range of motion  - Assess patient's mobility; develop plan if impaired  - Assess patient's need for assistive devices and provide as appropriate  - Encourage maximum independence but intervene and supervise when necessary  - Involve family in performance of ADLs  - Assess for home care needs following discharge   - Consider OT consult to assist with ADL evaluation and planning for discharge  - Provide patient education as appropriate  Outcome: Progressing  Goal: Maintains/Returns to pre admission functional level  Description: INTERVENTIONS:  - Perform BMAT or MOVE assessment daily    - Set and communicate daily mobility goal to care team and patient/family/caregiver     - Collaborate with rehabilitation services on mobility goals if consulted  - Perform Range of Motion times a day  - Reposition patient every hours    - Dangle patient times a day  - Stand patient times a day  - Ambulate patient  times a day  - Out of bed to chair times a day   - Out of bed for meals times a day  - Out of bed for toileting  - Record patient progress and toleration of activity level   Outcome: Progressing     Problem: GASTROINTESTINAL - ADULT  Goal: Minimal or absence of nausea and/or vomiting  Description: INTERVENTIONS:  - Administer IV fluids if ordered to ensure adequate hydration  - Maintain NPO status until nausea and vomiting are resolved  - Nasogastric tube if ordered  - Administer ordered antiemetic medications as needed  - Provide nonpharmacologic comfort measures as appropriate  - Advance diet as tolerated, if ordered  - Consider nutrition services referral to assist patient with adequate nutrition and appropriate food choices  Outcome: Progressing  Goal: Maintains or returns to baseline bowel function  Description: INTERVENTIONS:  - Assess bowel function  - Encourage oral fluids to ensure adequate hydration  - Administer IV fluids if ordered to ensure adequate hydration  - Administer ordered medications as needed  - Encourage mobilization and activity  - Consider nutritional services referral to assist patient with adequate nutrition and appropriate food choices  Outcome: Progressing  Goal: Maintains adequate nutritional intake  Description: INTERVENTIONS:  - Monitor percentage of each meal consumed  - Identify factors contributing to decreased intake, treat as appropriate  - Assist with meals as needed  - Monitor I&O, weight, and lab values if indicated  - Obtain nutrition services referral as needed  Outcome: Progressing     Problem: METABOLIC, FLUID AND ELECTROLYTES - ADULT  Goal: Electrolytes maintained within normal limits  Description: INTERVENTIONS:  - Monitor labs and assess patient for signs and symptoms of electrolyte imbalances  - Administer electrolyte replacement as ordered  - Monitor response to electrolyte replacements, including repeat lab results as appropriate  - Instruct patient on fluid and nutrition as appropriate  Outcome: Progressing  Goal: Fluid balance maintained  Description: INTERVENTIONS:  - Monitor labs   - Monitor I/O and WT  - Instruct patient on fluid and nutrition as appropriate  - Assess for signs & symptoms of volume excess or deficit  Outcome: Progressing     Problem: SKIN/TISSUE INTEGRITY - ADULT  Goal: Skin Integrity remains intact(Skin Breakdown Prevention)  Description: Assess:  -Perform Marco assessment every 2 hrs  -Clean and moisturize skin every   -Inspect skin when repositioning, toileting, and assisting with ADLS  -Assess under medical devices such as  ever  -Assess extremities for adequate circulation and sensation     Bed Management:  -Have minimal linens on bed & keep smooth, unwrinkled  -Change linens as needed when moist or perspiring  -Avoid sitting or lying in one position for more than  hours while in bed  -Keep HOB at degrees     Toileting:  -Offer bedside commode  -Assess for incontinence every   -Use incontinent care products after each incontinent episode such as     Activity:  -Mobilize patient  times a day  -Encourage activity and walks on unit  -Encourage or provide ROM exercises   -Turn and reposition patient every  Hours  -Use appropriate equipment to lift or move patient in bed  -Instruct/ Assist with weight shifting every when out of bed in chair  -Consider limitation of chair time hour intervals    Skin Care:  -Avoid use of baby powder, tape, friction and shearing, hot water or constrictive clothing  -Relieve pressure over bony prominences using   -Do not massage red bony areas    Next Steps:  -Teach patient strategies to minimize risks such as   -Consider consults to  interdisciplinary teams such as   Outcome: Progressing

## 2021-07-11 NOTE — ASSESSMENT & PLAN NOTE
History of Parkinson Disease on carbidopa and levodopa  Ambulates with walker at baseline, but noted for worsening ambulatory dysfunction over a few weeks  Has been managed by neurology at [facility] with recent modifications to anti-Parkinson medications  - Obtain PT/OT  - Continue carbidopa-levodopa therapy  - Obtain neurology consultation

## 2021-07-11 NOTE — H&P
[address] [facility]  H&P- Lizy Kemp 1935, 80 y o  female MRN: [id number]  Unit/Bed#: ED 25 Encounter: [id number]  Primary Care Provider: Jorge Verduzco MD   Date and time admitted to hospital: 7/11/2021  7:20 AM    Constipation  Assessment & Plan  Patient has a history of abdominal pain and constipation, with a previous presentation on 7/3 for these complaints, and discharged with ducolax and golytely Patient reports improvements in bowel movements, but did not take laxatives consistently, as she was spending much time on the toilet and changing her sanitary underwear frequently due to loose movements  CT C/A/P on presentation show slight improvement in constipation  No electrolyte abnormalities noted  Possibly medication-related given reported adjustments to anti-Parkinson medications  - Continue ducolax suppository, mirilax, psyllium and senna  Elevated brain natriuretic peptide (BNP) level  Assessment & Plan  Patient reports transient shortness of breath with exertion  Elevated NT- pro BNP on initial labs, however no signs of volume overload and no cardiac history reported  EKG and troponins negative for ACS or any ischemic changes  - Will obtain 2D Echocardiogram     Ambulatory dysfunction  Assessment & Plan  Presents with worsening ambulatory dysfunction for a few weeks  Patient has a history of Parkinson's disease in addition to peripheral neuropathy  Patient also reports constipation and frequent urination  Denies any back pain or new onset focal neurological deficits  Patient was supposed be placed in long-term care facility in 2 weeks time  - Obtain PT/OT evaluation and treatment  - Obtain case management for appropriate and efficient placement post-discharge  Parkinson's disease [facility])  Assessment & Plan  History of Parkinson Disease on carbidopa and levodopa   Ambulates with walker at baseline, but noted for worsening ambulatory dysfunction over a few weeks  Has been managed by neurology at [address] [place] [address] with recent modifications to anti-Parkinson medications  - Obtain PT/OT  - Continue carbidopa-levodopa therapy  - Obtain neurology consultation  VTE Prophylaxis: Enoxaparin (Lovenox)  / sequential compression device   Code Status: Full Code  POLST: POLST form is not discussed and not completed at this time  Discussion with family: Son present at the bedside at the time of this encounter  Anticipated Length of Stay:  Patient will be admitted on an Inpatient basis with an anticipated length of stay of  Greater than 2 midnights  Justification for Hospital Stay: ambulatory dysfunction work-up  Appropriate post-discharge placement  Total Time for Visit, including Counseling / Coordination of Care: 45 minutes  Greater than 50% of this total time spent on direct patient counseling and coordination of care  Chief Complaint:   Ambulatory dysfunction    History of Present Illness:    David Fallon is a 80 y o  female who presents with ambulatory dysfunction for a few weeks duration  Patient also reported additional symptoms of abdominal pain and shortness of breath  Patient has a history of Parkinson's disease managed carbidopa and levodopa and ambulates with a walker at baseline, noted for progressive weakness, worse in the morning with some improvement as the day progresses  Patient also reports constipation, in addition to urinary frequency, and had been seen at [facility] ED on 7/3 for abdominal pain and constipation, given goLYTELY and advised to take  mirilax colace and dulcolax suppository as needed   Patient has chronic peripheral neuropathy but denies any lower back pain or radiating symptoms  Patient's son is present at the bedside at the time of the encounter, and states that patient was to be placed in [place] in 2 weeks, however concern is express due to patient's worsening functional capacity      Review of Systems:    Review of Systems   Constitutional: Negative for chills and fever  HENT: Negative for ear pain and sore throat  Eyes: Negative for pain and visual disturbance  Respiratory: Positive for shortness of breath  Negative for cough  Cardiovascular: Negative for chest pain and palpitations  Gastrointestinal: Positive for abdominal pain and constipation  Negative for nausea and vomiting  Genitourinary: Negative for dysuria and hematuria  Musculoskeletal: Negative for arthralgias and back pain  Skin: Negative for color change and rash  Neurological: Positive for weakness  Negative for dizziness, seizures, syncope and headaches  Psychiatric/Behavioral: Negative for agitation and behavioral problems  All other systems reviewed and are negative  Past Medical and Surgical History:     Past Medical History:   Diagnosis Date    Bruit of left carotid artery     Cervicalgia     Constipation     CTS (carpal tunnel syndrome)     right    Hypercholesteremia     Nausea     Parkinson's disease ([facility] [address] )     Tremor        Past Surgical History:   Procedure Laterality Date    BREAST LUMPECTOMY Bilateral     COLONOSCOPY      LUMBAR FUSION      NEUROPLASTY / TRANSPOSITION MEDIAN NERVE AT CARPAL TUNNEL Right        Meds/Allergies:    Prior to Admission medications    Medication Sig Start Date End Date Taking?  Authorizing Provider   acetaminophen (TYLENOL) 325 mg tablet Take 1 tablet by mouth daily as needed    Historical Provider, MD   aspirin 81 MG tablet Take 1 tablet by mouth daily    Historical Provider, MD   bisacodyl (DULCOLAX) 10 mg suppository Insert 1 suppository (10 mg total) into the rectum daily as needed for constipation 7/3/21   Zachariah Carson DO   carbidopa (LODOSYN) 25 MG tablet Take 1 tablet by mouth 4 (four) times a day 11/30/20   Jyothi Lima MD   carbidopa-levodopa (SINEMET)  mg per tablet Take 1 tablet by mouth 4 (four) times a day 1/26/21   Jyothi Lima MD   dibucaine (Arturo Petrin) 1 % ointment Apply topically 3 (three) times a day  Patient taking differently: Apply topically as needed  7/16/19   Sherrill Neil PA-C   polyethylene glycol (GOLYTELY) 4000 mL solution Take 4,000 mL by mouth once for 1 dose 7/3/21 7/3/21  Zachariah Carson DO   polyethylene glycol (MIRALAX) 17 g packet Take 17 g by mouth daily    Historical Provider, MD   Sennosides 25 MG TABS Take 1 tablet by mouth daily as needed      Historical Provider, MD   Wheat Dextrin (Benefiber) POWD Take 1 Package by mouth daily    Historical Provider, MD     I have reviewed home medications with patient personally  Allergies: No Known Allergies    Social History:     Marital Status:    Occupation: retired  Patient Pre-hospital Living Situation: lives at home with son  Patient Pre-hospital Level of Mobility: ambulates with walker  Patient Pre-hospital Diet Restrictions: none  Substance Use History:   Social History     Substance and Sexual Activity   Alcohol Use No     Social History     Tobacco Use   Smoking Status Never Smoker   Smokeless Tobacco Never Used     Social History     Substance and Sexual Activity   Drug Use No       Family History:    Family History   Problem Relation Age of Onset    Cancer Mother     No Known Problems Father     Diabetes Sister     Heart disease Sister        Physical Exam:     Vitals:   Blood Pressure: 133/63 (07/11/21 1300)  Pulse: 76 (07/11/21 1300)  Temperature: 98 6 °F (37 °C) (07/11/21 0722)  Temp Source: Oral (07/11/21 0722)  Respirations: 22 (07/11/21 1300)  Weight - Scale: 46 1 kg (101 lb 10 1 oz) (07/11/21 0722)  SpO2: 97 % (07/11/21 1300)    Physical Exam  Vitals and nursing note reviewed  Constitutional:       General: She is not in acute distress  Appearance: She is well-developed  HENT:      Head: Normocephalic and atraumatic  Mouth/Throat:      Mouth: Mucous membranes are moist    Eyes:      Conjunctiva/sclera: Conjunctivae normal    Neck:      Vascular: No JVD  Cardiovascular:      Rate and Rhythm: Normal rate and regular rhythm  Heart sounds: No murmur heard  Pulmonary:      Effort: Pulmonary effort is normal  No respiratory distress  Breath sounds: Normal breath sounds  Abdominal:      Palpations: Abdomen is soft  There is no mass  Tenderness: There is no abdominal tenderness  There is no guarding or rebound  Musculoskeletal:      Cervical back: Neck supple  Right lower leg: No edema  Left lower leg: No edema  Skin:     General: Skin is warm and dry  Coloration: Skin is jaundiced  Neurological:      General: No focal deficit present  Mental Status: She is alert and oriented to person, place, and time  Psychiatric:         Mood and Affect: Mood normal          Behavior: Behavior normal            Additional Data:     Lab Results: I have personally reviewed pertinent reports  Results from last 7 days   Lab Units 07/11/21  0743   WBC Thousand/uL 5 62   HEMOGLOBIN g/dL 13 8   HEMATOCRIT % 41 7   PLATELETS Thousands/uL 258   NEUTROS PCT % 67   LYMPHS PCT % 21   MONOS PCT % 11   EOS PCT % 0     Results from last 7 days   Lab Units 07/11/21  0743   SODIUM mmol/L 138   POTASSIUM mmol/L 4 3   CHLORIDE mmol/L 104   CO2 mmol/L 26   BUN mg/dL 15   CREATININE mg/dL 0 70   ANION GAP mmol/L 8   CALCIUM mg/dL 8 9   ALBUMIN g/dL 3 9   TOTAL BILIRUBIN mg/dL 0 49   ALK PHOS U/L 69   ALT U/L 14   AST U/L 11   GLUCOSE RANDOM mg/dL 93                       Imaging: I have personally reviewed pertinent reports  CT chest abdomen pelvis w contrast   Final Result by Valeri Calderon DO (07/11 1327)   1  Slight improvement in constipation  2   Mild abnormal appearance of the urinary bladder  Correlate for underlying urinary tract infection  The study was marked in [facility] for immediate notification  Workstation performed: [id number]             EKG, Pathology, and Other Studies Reviewed on Admission:   · EKG: NSR  NAD  No acute ischemic changes noted  No arrhythmias appreciated  Allscripts / Epic Records Reviewed: Yes     ** Please Note: This note has been constructed using a voice recognition system   **

## 2021-07-11 NOTE — ED PROVIDER NOTES
History  Chief Complaint   Patient presents with    Abdominal Pain     ems was called for unable to move and SOB  hx of parkinsons  complains of abd pain and SOB  ambulatory with EMS  HPI  79 yo F with PMH Parkinsons disease, recent ED visit for constipation presents with abdominal pain and shortness of breath  Patient unable to say how long she has had the symptoms  She states that the abdominal pain is aching and constant  She thinks that she got some improvement with the medications for constipation  Denies chest pain, cough, nausea, vomiting, diarrhea, fevers  She states that her Parkinson's makes it hard to get around her house  She states she is supposed to go to a personal care home in the next two weeks but feels like she cannot get around at home anymore even with her walker  She lives with her son  Prior to Admission Medications   Prescriptions Last Dose Informant Patient Reported? Taking?    Sennosides 25 MG TABS  Self Yes No   Sig: Take 1 tablet by mouth daily as needed     Wheat Dextrin (Benefiber) POWD  Self Yes No   Sig: Take 1 Package by mouth daily   acetaminophen (TYLENOL) 325 mg tablet  Self Yes No   Sig: Take 1 tablet by mouth daily as needed   aspirin 81 MG tablet  Self Yes No   Sig: Take 1 tablet by mouth daily   bisacodyl (DULCOLAX) 10 mg suppository   No No   Sig: Insert 1 suppository (10 mg total) into the rectum daily as needed for constipation   carbidopa (LODOSYN) 25 MG tablet  Self No No   Sig: Take 1 tablet by mouth 4 (four) times a day   carbidopa-levodopa (SINEMET)  mg per tablet   No No   Sig: Take 1 tablet by mouth 4 (four) times a day   dibucaine (NUPERCAINAL) 1 % ointment  Self No No   Sig: Apply topically 3 (three) times a day   Patient taking differently: Apply topically as needed    polyethylene glycol (GOLYTELY) 4000 mL solution   No No   Sig: Take 4,000 mL by mouth once for 1 dose   polyethylene glycol (MIRALAX) 17 g packet  Self Yes No   Sig: Take 17 g by mouth daily      Facility-Administered Medications: None       Past Medical History:   Diagnosis Date    Bruit of left carotid artery     Cervicalgia     Constipation     CTS (carpal tunnel syndrome)     right    Hypercholesteremia     Nausea     Parkinson's disease ([address] )     Tremor        Past Surgical History:   Procedure Laterality Date    BREAST LUMPECTOMY Bilateral     COLONOSCOPY      LUMBAR FUSION      NEUROPLASTY / TRANSPOSITION MEDIAN NERVE AT CARPAL TUNNEL Right        Family History   Problem Relation Age of Onset    Cancer Mother     No Known Problems Father     Diabetes Sister     Heart disease Sister      I have reviewed and agree with the history as documented  E-Cigarette/Vaping    E-Cigarette Use Never User      E-Cigarette/Vaping Substances    Nicotine No     THC No     CBD No     Flavoring No     Other No     Unknown No      Social History     Tobacco Use    Smoking status: Never Smoker    Smokeless tobacco: Never Used   Vaping Use    Vaping Use: Never used   Substance Use Topics    Alcohol use: No    Drug use: No       Review of Systems   Constitutional: Negative for chills and fever  HENT: Negative for dental problem and ear pain  Eyes: Negative for pain and redness  Respiratory: Positive for shortness of breath  Negative for cough  Cardiovascular: Negative for chest pain and palpitations  Gastrointestinal: Positive for abdominal pain  Negative for nausea  Endocrine: Negative for polydipsia and polyphagia  Genitourinary: Negative for dysuria and frequency  Musculoskeletal: Negative for arthralgias and joint swelling  Skin: Negative for color change and rash  Neurological: Negative for dizziness and headaches  Psychiatric/Behavioral: Negative for behavioral problems and confusion  All other systems reviewed and are negative  Physical Exam  Physical Exam  Vitals and nursing note reviewed     Constitutional:       General: She is not in acute distress  Appearance: She is well-developed  She is not diaphoretic  HENT:      Head: Atraumatic  Right Ear: External ear normal       Left Ear: External ear normal       Nose: Nose normal    Eyes:      Conjunctiva/sclera: Conjunctivae normal       Pupils: Pupils are equal, round, and reactive to light  Neck:      Vascular: No JVD  Cardiovascular:      Rate and Rhythm: Normal rate and regular rhythm  Heart sounds: Normal heart sounds  No murmur heard  Pulmonary:      Effort: Pulmonary effort is normal  No respiratory distress  Breath sounds: Normal breath sounds  No wheezing  Abdominal:      General: Bowel sounds are normal  There is no distension  Palpations: Abdomen is soft  Tenderness: There is no abdominal tenderness  Musculoskeletal:         General: Normal range of motion  Cervical back: Normal range of motion and neck supple  Skin:     General: Skin is warm and dry  Capillary Refill: Capillary refill takes less than 2 seconds  Neurological:      Mental Status: She is alert and oriented to person, place, and time  Cranial Nerves: No cranial nerve deficit     Psychiatric:         Behavior: Behavior normal          Vital Signs  ED Triage Vitals [07/11/21 0722]   Temperature Pulse Respirations Blood Pressure SpO2   98 6 °F (37 °C) 69 15 (!) 192/85 99 %      Temp Source Heart Rate Source Patient Position - Orthostatic VS BP Location FiO2 (%)   Oral Monitor -- Right arm --      Pain Score       5           Vitals:    07/11/21 0722 07/11/21 0745   BP: (!) 192/85 155/65   Pulse: 69 70         Visual Acuity      ED Medications  Medications   iohexol (OMNIPAQUE) 350 MG/ML injection (SINGLE-DOSE) 85 mL (85 mL Intravenous Given 7/11/21 0816)       Diagnostic Studies  Results Reviewed     Procedure Component Value Units Date/Time    Novel Coronavirus [facility] [[id number]]  (Normal) Collected: 07/11/21 0743    Lab Status: Final result Specimen: Nares from Nose Updated: 07/11/21 0901     SARS-CoV-2 Negative    Narrative: The specimen collection materials, transport medium, and/or testing methodology utilized in the production of these test results have been proven to be reliable in a limited validation with an abbreviated program under the Emergency Utilization Authorization provided by the FDA  Testing reported as "Presumptive positive" will be confirmed with secondary testing to ensure result accuracy  Clinical caution and judgement should be used with the interpretation of these results with consideration of the clinical impression and other laboratory testing  Testing reported as "Positive" or "Negative" has been proven to be accurate according to standard laboratory validation requirements  All testing is performed with control materials showing appropriate reactivity at standard intervals        UA w Reflex to Microscopic w Reflex to Culture [[id number]] Collected: 07/11/21 0836    Lab Status: Final result Specimen: Urine, Other Updated: 07/11/21 0842     Color, UA Yellow     Clarity, UA Clear     Specific Gravity, UA 1 010     pH, UA 6 5     Leukocytes, UA Negative     Nitrite, UA Negative     Protein, UA Negative mg/dl      Glucose, UA Negative mg/dl      Ketones, UA Negative mg/dl      Urobilinogen, UA 0 2 E U /dl      Bilirubin, UA Negative     Blood, UA Negative    NT-BNP PRO [[id number]]  (Abnormal) Collected: 07/11/21 0743    Lab Status: Final result Specimen: Blood from Arm, Left Updated: 07/11/21 0811     NT-proBNP 538 pg/mL     Troponin I [[id number]]  (Normal) Collected: 07/11/21 0743    Lab Status: Final result Specimen: Blood from Arm, Left Updated: 07/11/21 0806     Troponin I <0 02 ng/mL     Comprehensive metabolic panel [[id number]] Collected: 07/11/21 0743    Lab Status: Final result Specimen: Blood from Arm, Left Updated: 07/11/21 0804     Sodium 138 mmol/L      Potassium 4 3 mmol/L      Chloride 104 mmol/L      CO2 26 mmol/L ANION GAP 8 mmol/L      BUN 15 mg/dL      Creatinine 0 70 mg/dL      Glucose 93 mg/dL      Calcium 8 9 mg/dL      AST 11 U/L      ALT 14 U/L      Alkaline Phosphatase 69 U/L      Total Protein 7 6 g/dL      Albumin 3 9 g/dL      Total Bilirubin 0 49 mg/dL      eGFR 79 ml/min/1 73sq m     Narrative:      National Kidney Disease Foundation guidelines for Chronic Kidney Disease (CKD):     Stage 1 with normal or high GFR (GFR > 90 mL/min/1 73 square meters)    Stage 2 Mild CKD (GFR = 60-89 mL/min/1 73 square meters)    Stage 3A Moderate CKD (GFR = 45-59 mL/min/1 73 square meters)    Stage 3B Moderate CKD (GFR = 30-44 mL/min/1 73 square meters)    Stage 4 Severe CKD (GFR = 15-29 mL/min/1 73 square meters)    Stage 5 End Stage CKD (GFR <15 mL/min/1 73 square meters)  Note: GFR calculation is accurate only with a steady state creatinine    CBC and differential [[id number]] Collected: 07/11/21 0743    Lab Status: Final result Specimen: Blood from Arm, Left Updated: 07/11/21 0749     WBC 5 62 Thousand/uL      RBC 4 34 Million/uL      Hemoglobin 13 8 g/dL      Hematocrit 41 7 %      MCV 96 fL      MCH 31 8 pg      MCHC 33 1 g/dL      RDW 12 0 %      MPV 9 3 fL      Platelets 492 Thousands/uL      nRBC 0 /100 WBCs      Neutrophils Relative 67 %      Immat GRANS % 0 %      Lymphocytes Relative 21 %      Monocytes Relative 11 %      Eosinophils Relative 0 %      Basophils Relative 1 %      Neutrophils Absolute 3 75 Thousands/µL      Immature Grans Absolute 0 02 Thousand/uL      Lymphocytes Absolute 1 20 Thousands/µL      Monocytes Absolute 0 60 Thousand/µL      Eosinophils Absolute 0 02 Thousand/µL      Basophils Absolute 0 03 Thousands/µL                  CT chest abdomen pelvis w contrast   Final Result by Juliana Martin DO (07/11 9572)   1  Slight improvement in constipation  2   Mild abnormal appearance of the urinary bladder  Correlate for underlying urinary tract infection              The study was marked in EPIC for immediate notification  Workstation performed: [id number]                    Procedures  ECG 12 Lead Documentation Only    Date/Time: 7/11/2021 7:42 AM  Performed by: Lesley Kenny MD  Authorized by: Lesley Kenny MD     Comments:      Normal sinus rhythm rate of 73, normal axis intervals, no acute ST elevations or depressions             ED Course                             SBIRT 22yo+      Most Recent Value   SBIRT (25 yo +)   In order to provide better care to our patients, we are screening all of our patients for alcohol and drug use  Would it be okay to ask you these screening questions? Yes Filed at: 07/11/2021 0731   Initial Alcohol Screen: US AUDIT-C    1  How often do you have a drink containing alcohol?  0 Filed at: 07/11/2021 0731   2  How many drinks containing alcohol do you have on a typical day you are drinking? 0 Filed at: 07/11/2021 0731   3a  Male UNDER 65: How often do you have five or more drinks on one occasion? 0 Filed at: 07/11/2021 0731   3b  FEMALE Any Age, or MALE 65+: How often do you have 4 or more drinks on one occassion? 0 Filed at: 07/11/2021 0731   Audit-C Score  0 Filed at: 07/11/2021 4617   [place]: How many times in the past year have you    Used an illegal drug or used a prescription medication for non-medical reasons? Never Filed at: 07/11/2021 9502                    MDM  Patient presents with trouble walking and carrying out her activities of daily living at home due to her worsening Parkinson's  She is also complaining of vague shortness of breath and abdominal pain, she is not hypoxic, labs are unremarkable and reassuring  Her CT scan shows no acute process other than constipation  I discussed with son and patient at bedtime and they both do not feel that she is safe to go home at this time and are requesting admission for consideration for a skilled nursing facility rather than the plan of the personal care home  Will admit    Disposition  Final diagnoses:   Ambulatory dysfunction   Constipation     Time reflects when diagnosis was documented in both MDM as applicable and the Disposition within this note     Time User Action Codes Description Comment    7/11/2021  9:08 AM Martin Freedman [R26 2] Ambulatory dysfunction     7/11/2021  9:08 AM Martin Freedman [K59 00] Constipation       ED Disposition     ED Disposition Condition Date/Time Comment    Admit Stable Sun Jul 11, 2021  9:10 AM Case was discussed with Dr Edith Maya and the patient's admission status was agreed to be Admission Status: inpatient status to the service of Dr Edith Maya   Follow-up Information    None         Patient's Medications   Discharge Prescriptions    No medications on file     No discharge procedures on file      PDMP Review     None          ED Provider  Electronically Signed by           Shakira Cooney MD  07/11/21 2791

## 2021-07-11 NOTE — ASSESSMENT & PLAN NOTE
Patient has a history of abdominal pain and constipation, with a previous presentation on 7/3 for these complaints, and discharged with ducolax and golytely Patient reports improvements in bowel movements, but did not take laxatives consistently, as she was spending much time on the toilet and changing her sanitary underwear frequently due to loose movements  CT C/A/P on presentation show slight improvement in constipation  No electrolyte abnormalities noted  Possibly medication-related given reported adjustments to anti-Parkinson medications  - Continue ducolax suppository, mirilax, psyllium and senna

## 2021-07-11 NOTE — H&P
[facility]  H&P- Ardsley Sanjeev 1935, 80 y o  female MRN: [id number]  Unit/Bed#: ED 25 Encounter: [id number]  Primary Care Provider: Amanda Chavez MD   Date and time admitted to hospital: 7/11/2021  7:20 AM            Adde  Constipation  Assessment & Plan  Patient has a history of abdominal pain and constipation, with a previous presentation on 7/3 for these complaints, and discharged with ducolax and golytely Patient reports improvements in bowel movements, but did not take laxatives consistently, as she was spending much time on the toilet and changing her sanitary underwear frequently due to loose movements  CT C/A/P on presentation show slight improvement in constipation  No electrolyte abnormalities noted  Possibly medication-related given reported adjustments to anti-Parkinson medications      - Continue ducolax suppository, mirilax, psyllium and senna         Elevated brain natriuretic peptide (BNP) level  Assessment & Plan  Patient reports transient shortness of breath with exertion  Elevated NT- pro BNP on initial labs, however no signs of volume overload and no cardiac history reported  EKG and troponins negative for ACS or any ischemic changes      - Will obtain 2D Echocardiogram      Ambulatory dysfunction  Assessment & Plan  Presents with worsening ambulatory dysfunction for a few weeks  Patient has a history of Parkinson's disease in addition to peripheral neuropathy  Patient also reports constipation and frequent urination  Denies any back pain or new onset focal neurological deficits  Patient was supposed be placed in long-term care facility in 2 weeks time        - Obtain PT/OT evaluation and treatment  - Obtain case management for appropriate and efficient placement post-discharge      Park[address]'s disease ([facility] [address] )  Assessment & Plan  History of Parkinson Disease on carbidopa and levodopa   Ambulates with walker at baseline, but noted for worsening ambulatory dysfunction over a few weeks  Has been managed by neurology at [facility] with recent modifications to anti-Parkinson medications      - Obtain PT/OT  - Continue carbidopa-levodopa therapy  - Obtain neurology consultation        VTE Prophylaxis: Enoxaparin (Lovenox)  / sequential compression device   Code Status: Full Code  POLST: POLST form is not discussed and not completed at this time  Discussion with family: Son present at the bedside at the time of this encounter      Anticipated Length of Stay:  Patient will be admitted on an Inpatient basis with an anticipated length of stay of  Greater than 2 midnights  Justification for Hospital Stay: ambulatory dysfunction work-up  Appropriate post-discharge placement      Total Time for Visit, including Counseling / Coordination of Care: 45 minutes  Greater than 50% of this total time spent on direct patient counseling and coordination of care      Chief Complaint:   Ambulatory dysfunction     History of Present Illness:     Lianne Bear is a 80 y o  female who presents with ambulatory dysfunction for a few weeks duration  Patient also reported additional symptoms of abdominal pain and shortness of breath  Patient has a history of Parkinson's disease managed carbidopa and levodopa and ambulates with a walker at baseline, noted for progressive weakness, worse in the morning with some improvement as the day progresses  Patient also reports constipation, in addition to urinary frequency, and had been seen [facility] [place] [facility] for abdominal pain and constipation, given goLYTELY and advised to take  mirilax colace and dulcolax suppository as needed   Patient has chronic peripheral neuropathy but denies any lower back pain or radiating symptoms    Patient's son is present at the bedside at the time of the encounter, and states that patient was to be placed in [place] in 2 weeks, however concern is express due to patient's worsening functional capacity      Review of Systems:     Review of Systems   Constitutional: Negative for chills and fever  HENT: Negative for ear pain and sore throat  Eyes: Negative for pain and visual disturbance  Respiratory: Positive for shortness of breath  Negative for cough  Cardiovascular: Negative for chest pain and palpitations  Gastrointestinal: Positive for abdominal pain and constipation  Negative for nausea and vomiting  Genitourinary: Negative for dysuria and hematuria  Musculoskeletal: Negative for arthralgias and back pain  Skin: Negative for color change and rash  Neurological: Positive for weakness  Negative for dizziness, seizures, syncope and headaches  Psychiatric/Behavioral: Negative for agitation and behavioral problems  All other systems reviewed and are negative         Past Medical and Surgical History:      Medical History        Past Medical History:   Diagnosis Date    Bruit of left carotid artery      Cervicalgia      Constipation      CTS (carpal tunnel syndrome)       right    Hypercholesteremia      Nausea      Parkinson's disease (HCC)      Tremor              Surgical History         Past Surgical History:   Procedure Laterality Date    BREAST LUMPECTOMY Bilateral      COLONOSCOPY        LUMBAR FUSION        NEUROPLASTY / TRANSPOSITION MEDIAN NERVE AT CARPAL TUNNEL Right              Meds/Allergies:             Prior to Admission medications    Medication Sig Start Date End Date Taking?  Authorizing Provider   acetaminophen (TYLENOL) 325 mg tablet Take 1 tablet by mouth daily as needed       Historical Provider, MD   aspirin 81 MG tablet Take 1 tablet by mouth daily       Historical Provider, MD   bisacodyl (DULCOLAX) 10 mg suppository Insert 1 suppository (10 mg total) into the rectum daily as needed for constipation 7/3/21     Zachariah Carson DO   carbidopa (LODOSYN) 25 MG tablet Take 1 tablet by mouth 4 (four) times a day 11/30/20     Joselyn Gomes MD carbidopa-levodopa (SINEMET)  mg per tablet Take 1 tablet by mouth 4 (four) times a day 1/26/21     Seng Tello MD   dibucaine (NUPERCAINAL) 1 % ointment Apply topically 3 (three) times a day  Patient taking differently: Apply topically as needed  7/16/19     Senait Cabral PA-C   polyethylene glycol (GOLYTELY) 4000 mL solution Take 4,000 mL by mouth once for 1 dose 7/3/21 7/3/21   Zachariah Carson DO   polyethylene glycol (MIRALAX) 17 g packet Take 17 g by mouth daily       Historical Provider, MD   Sennosides 25 MG TABS Take 1 tablet by mouth daily as needed         Historical Provider, MD   Wheat Dextrin (Benefiber) POWD Take 1 Package by mouth daily       Historical Provider, MD      I have reviewed home medications with patient personally      Allergies: No Known Allergies     Social History:     Marital Status:    Occupation: retired  Patient Pre-hospital Living Situation: lives at home with son  Patient Pre-hospital Level of Mobility: ambulates with walker  Patient Pre-hospital Diet Restrictions: none  Substance Use History:   Social History          Substance and Sexual Activity   Alcohol Use No      Social History          Tobacco Use   Smoking Status Never Smoker   Smokeless Tobacco Never Used      Social History          Substance and Sexual Activity   Drug Use No         Family History:           Family History   Problem Relation Age of Onset    Cancer Mother      No Known Problems Father      Diabetes Sister      Heart disease Sister           Physical Exam:      Vitals:   Blood Pressure: 133/63 (07/11/21 1300)  Pulse: 76 (07/11/21 1300)  Temperature: 98 6 °F (37 °C) (07/11/21 0722)  Temp Source: Oral (07/11/21 0722)  Respirations: 22 (07/11/21 1300)  Weight - Scale: 46 1 kg (101 lb 10 1 oz) (07/11/21 0722)  SpO2: 97 % (07/11/21 1300)     Physical Exam  Vitals and nursing note reviewed  Constitutional:       General: She is not in acute distress       Appearance: She is well-developed  HENT:      Head: Normocephalic and atraumatic  Mouth/Throat:      Mouth: Mucous membranes are moist    Eyes:      Conjunctiva/sclera: Conjunctivae normal    Neck:      Vascular: No JVD  Cardiovascular:      Rate and Rhythm: Normal rate and regular rhythm  Heart sounds: No murmur heard  Pulmonary:      Effort: Pulmonary effort is normal  No respiratory distress  Breath sounds: Normal breath sounds  Abdominal:      Palpations: Abdomen is soft  There is no mass  Tenderness: There is no abdominal tenderness  There is no guarding or rebound  Musculoskeletal:      Cervical back: Neck supple  Right lower leg: No edema  Left lower leg: No edema  Skin:     General: Skin is warm and dry  Coloration: Skin is jaundiced  Neurological:      General: No focal deficit present  Mental Status: She is alert and oriented to person, place, and time  Psychiatric:         Mood and Affect: Mood normal          Behavior: Behavior normal                Additional Data:      Lab Results: I have personally reviewed pertinent reports             Results from last 7 days   Lab Units 07/11/21  0743   WBC Thousand/uL 5 62   HEMOGLOBIN g/dL 13 8   HEMATOCRIT % 41 7   PLATELETS Thousands/uL 258   NEUTROS PCT % 67   LYMPHS PCT % 21   MONOS PCT % 11   EOS PCT % 0           Results from last 7 days   Lab Units 07/11/21  0743   SODIUM mmol/L 138   POTASSIUM mmol/L 4 3   CHLORIDE mmol/L 104   CO2 mmol/L 26   BUN mg/dL 15   CREATININE mg/dL 0 70   ANION GAP mmol/L 8   CALCIUM mg/dL 8 9   ALBUMIN g/dL 3 9   TOTAL BILIRUBIN mg/dL 0 49   ALK PHOS U/L 69   ALT U/L 14   AST U/L 11   GLUCOSE RANDOM mg/dL 93                         Imaging: I have personally reviewed pertinent reports        CT chest abdomen pelvis w contrast   Final Result by Eulogio Mccann DO (07/11 7791)   1  Slight improvement in constipation        2   Mild abnormal appearance of the urinary bladder    Correlate for underlying urinary tract infection                The study was marked in [facility] for immediate notification        Workstation performed: [id number]                 EKG, Pathology, and Other Studies Reviewed on Admission:   · EKG: NSR  NAD  No acute ischemic changes noted  No arrhythmias appreciated      Allscripts / Epic Records Reviewed: Yes      ** Please Note: This note has been constructed using a voice recognition system   **      Electronically signed by Elinor Corbin MD at 7/11/2021  1:49 PM

## 2021-07-11 NOTE — ASSESSMENT & PLAN NOTE
Presents with worsening ambulatory dysfunction for a few weeks  Patient has a history of Parkinson's disease in addition to peripheral neuropathy  Patient also reports constipation and frequent urination  Denies any back pain or new onset focal neurological deficits  Patient was supposed be placed in long-term care facility in 2 weeks time  - Obtain PT/OT evaluation and treatment  - Obtain case management for appropriate and efficient placement post-discharge

## 2021-07-12 ENCOUNTER — APPOINTMENT (INPATIENT)
Dept: NON INVASIVE DIAGNOSTICS | Facility: HOSPITAL | Age: 86
DRG: 092 | End: 2021-07-12
Payer: MEDICARE

## 2021-07-12 LAB
ANION GAP SERPL CALCULATED.3IONS-SCNC: 10 MMOL/L (ref 4–13)
BASOPHILS # BLD AUTO: 0.05 THOUSANDS/ΜL (ref 0–0.1)
BASOPHILS NFR BLD AUTO: 1 % (ref 0–1)
BUN SERPL-MCNC: 15 MG/DL (ref 5–25)
CALCIUM SERPL-MCNC: 8.8 MG/DL (ref 8.3–10.1)
CHLORIDE SERPL-SCNC: 103 MMOL/L (ref 100–108)
CO2 SERPL-SCNC: 25 MMOL/L (ref 21–32)
CREAT SERPL-MCNC: 0.63 MG/DL (ref 0.6–1.3)
EOSINOPHIL # BLD AUTO: 0.03 THOUSAND/ΜL (ref 0–0.61)
EOSINOPHIL NFR BLD AUTO: 1 % (ref 0–6)
ERYTHROCYTE [DISTWIDTH] IN BLOOD BY AUTOMATED COUNT: 11.9 % (ref 11.6–15.1)
GFR SERPL CREATININE-BSD FRML MDRD: 81 ML/MIN/1.73SQ M
GLUCOSE SERPL-MCNC: 95 MG/DL (ref 65–140)
HCT VFR BLD AUTO: 41.8 % (ref 34.8–46.1)
HGB BLD-MCNC: 13.9 G/DL (ref 11.5–15.4)
IMM GRANULOCYTES # BLD AUTO: 0.04 THOUSAND/UL (ref 0–0.2)
IMM GRANULOCYTES NFR BLD AUTO: 1 % (ref 0–2)
LYMPHOCYTES # BLD AUTO: 1.15 THOUSANDS/ΜL (ref 0.6–4.47)
LYMPHOCYTES NFR BLD AUTO: 18 % (ref 14–44)
MCH RBC QN AUTO: 31.9 PG (ref 26.8–34.3)
MCHC RBC AUTO-ENTMCNC: 33.3 G/DL (ref 31.4–37.4)
MCV RBC AUTO: 96 FL (ref 82–98)
MONOCYTES # BLD AUTO: 0.66 THOUSAND/ΜL (ref 0.17–1.22)
MONOCYTES NFR BLD AUTO: 11 % (ref 4–12)
NEUTROPHILS # BLD AUTO: 4.35 THOUSANDS/ΜL (ref 1.85–7.62)
NEUTS SEG NFR BLD AUTO: 68 % (ref 43–75)
NRBC BLD AUTO-RTO: 0 /100 WBCS
PLATELET # BLD AUTO: 247 THOUSANDS/UL (ref 149–390)
PMV BLD AUTO: 9.2 FL (ref 8.9–12.7)
POTASSIUM SERPL-SCNC: 3.7 MMOL/L (ref 3.5–5.3)
RBC # BLD AUTO: 4.36 MILLION/UL (ref 3.81–5.12)
SODIUM SERPL-SCNC: 138 MMOL/L (ref 136–145)
WBC # BLD AUTO: 6.28 THOUSAND/UL (ref 4.31–10.16)

## 2021-07-12 PROCEDURE — 99223 1ST HOSP IP/OBS HIGH 75: CPT | Performed by: SURGERY

## 2021-07-12 PROCEDURE — 93306 TTE W/DOPPLER COMPLETE: CPT | Performed by: INTERNAL MEDICINE

## 2021-07-12 PROCEDURE — 99222 1ST HOSP IP/OBS MODERATE 55: CPT | Performed by: PSYCHIATRY & NEUROLOGY

## 2021-07-12 PROCEDURE — 85025 COMPLETE CBC W/AUTO DIFF WBC: CPT | Performed by: INTERNAL MEDICINE

## 2021-07-12 PROCEDURE — 93306 TTE W/DOPPLER COMPLETE: CPT

## 2021-07-12 PROCEDURE — 97163 PT EVAL HIGH COMPLEX 45 MIN: CPT

## 2021-07-12 PROCEDURE — 99232 SBSQ HOSP IP/OBS MODERATE 35: CPT | Performed by: PHYSICIAN ASSISTANT

## 2021-07-12 PROCEDURE — 80048 BASIC METABOLIC PNL TOTAL CA: CPT | Performed by: INTERNAL MEDICINE

## 2021-07-12 RX ORDER — CARBIDOPA AND LEVODOPA 25; 100 MG/1; MG/1
1 TABLET, EXTENDED RELEASE ORAL
Status: DISCONTINUED | OUTPATIENT
Start: 2021-07-12 | End: 2021-07-14 | Stop reason: HOSPADM

## 2021-07-12 RX ADMIN — CARBIDOPA AND LEVODOPA 1 TABLET: 25; 100 TABLET, EXTENDED RELEASE ORAL at 21:34

## 2021-07-12 RX ADMIN — SENNOSIDES 25.8 MG: 8.6 TABLET, FILM COATED ORAL at 09:48

## 2021-07-12 RX ADMIN — ENOXAPARIN SODIUM 40 MG: 40 INJECTION SUBCUTANEOUS at 09:48

## 2021-07-12 RX ADMIN — POLYETHYLENE GLYCOL 3350 17 G: 17 POWDER, FOR SOLUTION ORAL at 09:51

## 2021-07-12 RX ADMIN — ASPIRIN 81 MG: 81 TABLET, DELAYED RELEASE ORAL at 09:48

## 2021-07-12 RX ADMIN — CARBIDOPA AND LEVODOPA 1 TABLET: 25; 100 TABLET ORAL at 09:48

## 2021-07-12 RX ADMIN — PSYLLIUM HUSK 1 PACKET: 3.4 POWDER ORAL at 09:50

## 2021-07-12 RX ADMIN — CARBIDOPA AND LEVODOPA 1.5 TABLET: 25; 100 TABLET ORAL at 18:17

## 2021-07-12 RX ADMIN — DOCUSATE SODIUM 100 MG: 100 CAPSULE, LIQUID FILLED ORAL at 09:48

## 2021-07-12 NOTE — ASSESSMENT & PLAN NOTE
· Pt with history of abdominal pain and constipation, previous presentation to the hospital on 7/3 for similar complaints   · CT scan with improvement of constipation noted on imaging   · Continue miralax, colace and psyllium, PRN dulcolax suppository

## 2021-07-12 NOTE — PROGRESS NOTES
Orthostatic blood pressures were attempted to get  Patient refuse standing at this time, reports that she is too weak and will do it tomorrow

## 2021-07-12 NOTE — PHYSICAL THERAPY NOTE
Physical Therapy Evaluation     Patient's Name: Cody Stern    Admitting Diagnosis  Parkinson's disease ([facility] ) [G20]  Abdominal pain [R10 9]  Constipation [K59 00]  Ambulatory dysfunction [R26 2]    Problem List  Patient Active Problem List   Diagnosis    [facility]'s disease ([facility] )    Idiopathic peripheral neuropathy    Carpal tunnel syndrome of left wrist    Medicare annual wellness visit, subsequent    Mixed hyperlipidemia    Basal cell carcinoma (BCC) of right cheek    Frequency of urination    Arterial hypotension    Ambulatory dysfunction    Elevated brain natriuretic peptide (BNP) level    Constipation     Past Medical History  Past Medical History:   Diagnosis Date    Bruit of left carotid artery     Cervicalgia     Constipation     CTS (carpal tunnel syndrome)     right    Hypercholesteremia     Nausea     Parkinson's disease ([facility] )     Tremor      Past Surgical History  Past Surgical History:   Procedure Laterality Date    BREAST LUMPECTOMY Bilateral     COLONOSCOPY      LUMBAR FUSION      NEUROPLASTY / TRANSPOSITION MEDIAN NERVE AT CARPAL TUNNEL Right       07/12/21 0931   PT Last Visit   PT Visit Date 07/12/21   Note Type   Note type Evaluation   Pain Assessment   Pain Assessment Tool 0-10   Pain Score No Pain   Home Living   Type of [address] [place] [address] One level;Stairs to enter with rails  (3 DWAYNE)   Bathroom Shower/Tub Walk-in shower   Bathroom Toilet Standard   Bathroom Equipment Grab bars in shower; Shower chair;Grab bars around toilet   [address] Walker;Quad cane;Cane  (RW and rollator)   Additional Comments Pt ambulates with a walker     Prior Function   Level of [place] Independent with ADLs and functional mobility   Lives With Son   Receives Help From Family;Home health  (3x/wk  for 4 hours)   ADL Assistance Needs assistance   IADLs Needs assistance   Falls in the last 6 months 0   Vocational Retired   Restrictions/Precautions Weight Bearing Precautions Per Order No   Other Precautions Chair Alarm; Bed Alarm; Fall Risk   General   Family/Caregiver Present Yes   Cognition   Overall Cognitive Status WFL   Arousal/Participation Alert   Orientation Level Oriented X4   Memory Within functional limits   Following Commands Follows all commands and directions without difficulty   Comments Pt agreeable to PT    RLE Assessment   RLE Assessment X   Strength RLE   RLE Overall Strength 3+/5   LLE Assessment   LLE Assessment X   Strength LLE   LLE Overall Strength 3+/5   Light Touch   RLE Light Touch Impaired   RLE Light Touch Comments dimished compared to left; pt reports neuropathy   LLE Light Touch Grossly intact   Bed Mobility   Additional Comments Pt was received seated in recliner in NAD   Transfers   Sit to Stand 4  Minimal assistance   Additional items Assist x 1; Armrests; Increased time required;Verbal cues   Stand to Sit 4  Minimal assistance   Additional items Assist x 1; Armrests; Increased time required;Verbal cues   Ambulation/Elevation   Gait pattern Excessively slow; Step to;Short stride; Inconsistent cheryl; Shuffling;Decreased foot clearance;Narrow JOSEPHINE   Gait Assistance 3  Moderate assist   Additional items Assist x 1;Verbal cues   Assistive Device Rolling walker   Distance 5 feet   Stair Management Assistance Not tested   Balance   Static Sitting Fair   Dynamic Sitting Fair -   Static Standing Poor +   Dynamic Standing Poor   Ambulatory Poor   Endurance Deficit   Endurance Deficit Yes   Activity Tolerance   Activity Tolerance Patient tolerated treatment well   Nurse Made Aware Discussed case with CARROL Leija; post session pt was left seated in recliner in NAD, all belongings within reach, +chair alarm   Assessment   Prognosis Good   Problem List Decreased strength;Decreased endurance; Impaired balance;Decreased mobility; Impaired sensation   Assessment Pt is 80year old female seen for PT evaluation s/p admit to [facility] on 7/11/2021 with Ambulatory dysfunction  PT consulted to assess pt's functional mobility and d/c needs  Order placed for PT eval and tx, with ambulate patient order  Comorbidities affecting pt's physical performance at time of assessment include Parkinson's disease, elevated BNP level, and constipation  PTA, pt was independent with all functional mobility with a rollator  Personal factors affecting pt at time of IE include stairs to enter home, inability to ambulate household distances, inability to navigate community distances, inability to navigate level surfaces without external assistance, unable to perform dynamic tasks in community, inability to perform IADLs, and inability to perform ADLs  Please find objective findings from PT assessment regarding body systems outlined above with impairments and limitations including weakness, impaired balance, decreased endurance, gait deviations, decreased activity tolerance, decreased functional mobility tolerance, altered sensation, and fall risk  The following objective measures performed on IE also reveal limitations: Barthel Index: 55/100 and Modified Wilburn: 4 (moderate/severe disability)  Pt's clinical presentation is currently unstable/unpredictable seen in pt's presentation of need for ongoing medical management/monitoring, pt is a fall risk, and pt requires cues/assist for safety with functional mobility  Pt to benefit from continued PT tx to address deficits as defined above and maximize level of functional independent mobility and consistency  From PT/mobility standpoint, recommendation at time of d/c would be STR pending progress in order to facilitate return to PLOF     Barriers to Discharge Inaccessible home environment   Goals   STG Expiration Date 07/22/21   Short Term Goal #1 In 7-10 days: Increase bilateral LE strength 1/2 grade to facilitate independent mobility, Perform all bed mobility tasks with close supervision to decrease caregiver burden, Perform all transfers with close supervision to improve independence, Ambulate > 50 ft  with least restrictive assistive device with CG assist w/o LOB and w/ normalized gait pattern 100% of the time, Navigate 3 stairs with min A of 1 with unilateral handrail to facilitate return to previous living environment and Increase all balance 1/2 grade to decrease risk for falls   Plan   Treatment/Interventions Functional transfer training;LE strengthening/ROM; Therapeutic exercise;Elevations; Endurance training;Patient/family training;Bed mobility;Gait training;Spoke to nursing;Family   PT Frequency Other (Comment)  (3-5x/wk)   Recommendation   PT Discharge Recommendation Post acute rehabilitation services   Equipment Recommended [address] [facility] Recommended Wheeled walker   Change/add to [employer]?  No   PT - OK to Discharge Yes  (when medically cleared, if to STR)   AM-PAC Basic Mobility Inpatient   Turning in Bed Without Bedrails 3   Lying on Back to Sitting on Edge of Flat Bed 3   Moving Bed to Chair 3   Standing Up From Chair 3   Walk in Room 2   Climb 3-5 Stairs 1   Basic Mobility Inpatient Raw Score 15   Basic Mobility Standardized Score 36 97   Modified Nazanin Scale   Modified Jessamine Scale 4   Barthel Index   Feeding 10   Bathing 0   Grooming Score 5   Dressing Score 5   Bladder Score 10   Bowels Score 10   Toilet Use Score 5   Transfers (Bed/Chair) Score 10   Mobility (Level Surface) Score 0   Stairs Score 0   Barthel Index Score 55     Roxie San, PT, DPT

## 2021-07-12 NOTE — PROGRESS NOTES
[facility]  Progress Note - Fany Side 1935, 80 y o  female MRN: [id number]  Unit/Bed#: -01 Encounter: [id number]  Primary Care Provider: Rupesh Robin MD   Date and time admitted to hospital: 7/11/2021  7:20 AM      DOS: 7/12/2021  * Ambulatory dysfunction  Assessment & Plan  · Pt presented to the hospital with generalized weakness and ambulatory dysfunction   · Hx of parkinson's disease with peripheral neuropathy    · Reports episodic dizziness, she believes her sinemet is contributing to her symptoms   · Neurology consultation pending   · PT/OT recommending STR, CM following for placement   · Fall precautions   · Supportive care  · CT on admission mainly unremarkable, improved constipation  Abnormal appearance of bladder, however UA negative    Parkinson's disease ([facility] )  Assessment & Plan  · Pt with hx of parkison's disease   · Maintained on Sinemet 25/100 mg QID and carbidopa QID for nausea   · Typically walks with walker at baseline, but has had difficulties with ambulation over last several weeks  · Follows with neurology outpatient with [facility]   · Obtain neurology consultation   · PT/OT consultations recommending STR  · Pt is requesting to be placed on hospice, however discussed with patient and family unlikely that she has a diagnosis that would qualify her to hospice  Would recommend palliative care consultation for the patient for symptomatic management and discussion of goals of care       Constipation  Assessment & Plan  · Pt with history of abdominal pain and constipation, previous presentation to the hospital on 7/3 for similar complaints   · CT scan with improvement of constipation noted on imaging   · Continue miralax, colace and psyllium, PRN dulcolax suppository    Elevated brain natriuretic peptide (BNP) level  Assessment & Plan  · Mild,    · Reporting intermittent shortness of breath on exertion   · Saturating high 90s on RA  · ECHO ordered, follow up results to r/o any CHF  · Troponin negative     VTE Pharmacologic Prophylaxis:   Pharmacologic: Enoxaparin (Lovenox)  Mechanical VTE Prophylaxis in Place: Yes    Patient Centered Rounds: I have evaluated patient without nursing staff present due to Speaking to nurse outside patient's room    Discussions with Specialists or Other Care Team Provider:  Discussed with Neurology, RN, cm and reviewed previous notes    Education and Discussions with Family / Patient:  Discussed with patient and family member at bedside regarding plan of care    Time Spent for Care: 20 minutes  More than 50% of total time spent on counseling and coordination of care as described above  Current Length of Stay: 1 day(s)    Current Patient Status: Inpatient   Certification Statement: The patient will continue to require additional inpatient hospital stay due to Short-term rehab placement, Neurology and palliative care recommendations    Discharge Plan:  Not medically stable as above, awaiting Neurology and palliative care recommendations, short-term rehab placement  Likely next 24-48 hours    Code Status: Level 3 - DNAR and DNI      Subjective:   Patient reports that she is tired of feeling sick all the time  States that she wants to be placed on hospice  However upon discussion regarding rehab patient is considering short-term rehab  She was supposed to be going to gets personal care home prior to admission before coming to the hospital   She reports that she does not think therapy will help her  She does report a some abdominal pain, reports poor appetite  States that she has not been ambulating well over last several weeks to months  Previously was using a Rollator      Objective:     Vitals:   Temp (24hrs), Av 9 °F (36 6 °C), Min:97 6 °F (36 4 °C), Max:98 1 °F (36 7 °C)    Temp:  [97 6 °F (36 4 °C)-98 1 °F (36 7 °C)] 97 8 °F (36 6 °C)  HR:  [59-79] 79  Resp:  [17-20] 18  BP: (122-169)/(53-86) 169/81  SpO2:  [94 %-97 %] 96 %  Body mass index is 20 73 kg/m²  Input and Output Summary (last 24 hours): Intake/Output Summary (Last 24 hours) at 7/12/2021 1354  Last data filed at 7/12/2021 0957  Gross per 24 hour   Intake 840 ml   Output 700 ml   Net 140 ml       Physical Exam:     Physical Exam  Vitals reviewed  Constitutional:       General: She is not in acute distress  Appearance: She is not toxic-appearing  Comments: Patient is in no acute distress lying in her hospital bed resting comfortably  Frail and flat affect noted   HENT:      Head: Normocephalic and atraumatic  Eyes:      Extraocular Movements: Extraocular movements intact  Conjunctiva/sclera: Conjunctivae normal    Cardiovascular:      Rate and Rhythm: Normal rate and regular rhythm  Pulses: Normal pulses  Pulmonary:      Effort: Pulmonary effort is normal  No respiratory distress  Breath sounds: No wheezing  Abdominal:      General: Bowel sounds are normal  There is no distension  Palpations: Abdomen is soft  Tenderness: There is no abdominal tenderness  There is no guarding  Musculoskeletal:      Right lower leg: No edema  Left lower leg: No edema  Skin:     General: Skin is warm and dry  Findings: No erythema  Neurological:      General: No focal deficit present  Mental Status: She is alert     Psychiatric:      Comments: Flat/depressed affect         Additional Data:     Labs:    Results from last 7 days   Lab Units 07/12/21  0540   WBC Thousand/uL 6 28   HEMOGLOBIN g/dL 13 9   HEMATOCRIT % 41 8   PLATELETS Thousands/uL 247   NEUTROS PCT % 68   LYMPHS PCT % 18   MONOS PCT % 11   EOS PCT % 1     Results from last 7 days   Lab Units 07/12/21  0540 07/11/21  0743   POTASSIUM mmol/L 3 7 4 3   CHLORIDE mmol/L 103 104   CO2 mmol/L 25 26   BUN mg/dL 15 15   CREATININE mg/dL 0 63 0 70   CALCIUM mg/dL 8 8 8 9   ALK PHOS U/L  --  69   ALT U/L  --  14   AST U/L  --  11           * I Have Reviewed All Lab Data Listed Above  * Additional Pertinent Lab Tests Reviewed: All Labs Within Last 24 Hours Reviewed    Imaging:    Imaging Reports Reviewed Today Include:  CT chest abdomen pelvis  Imaging Personally Reviewed by Myself Includes:  None    Recent Cultures (last 7 days):           Last 24 Hours Medication List:   Current Facility-Administered Medications   Medication Dose Route Frequency Provider Last Rate    acetaminophen  325 mg Oral Q6H PRN Rita Chirinos MD      aspirin  81 mg Oral Daily Rita Chirinos MD      bisacodyl  10 mg Rectal Daily PRN Rita Chirinos MD      carbidopa  25 mg Oral 4x Daily Nabor Hunter PA-C      carbidopa-levodopa  1 5 tablet Oral TID Nabor Hunter PA-C      [START ON 7/13/2021] carbidopa-levodopa  2 tablet Oral Daily Nabor Hunter PA-C      docusate sodium  100 mg Oral BID Rita Chirinos MD      enoxaparin  40 mg Subcutaneous Daily Rita Chirinos MD      polyethylene glycol  17 g Oral Daily Rita Chirinos MD      psyllium  1 packet Oral Daily Rita Chirinos MD      senna  25 8 mg Oral Daily PRN Rita Chirinos MD          Today, Patient Was Seen By: Lesley Rick PA-C    ** Please Note: Dictation voice to text software may have been used in the creation of this document   **

## 2021-07-12 NOTE — ASSESSMENT & PLAN NOTE
· Pt presented to the hospital with generalized weakness and ambulatory dysfunction   · Hx of parkinson's disease with peripheral neuropathy    · Reports episodic dizziness, she believes her sinemet is contributing to her symptoms   · Neurology consultation pending   · PT/OT recommending STR, CM following for placement   · Fall precautions   · Supportive care  · CT on admission mainly unremarkable, improved constipation   Abnormal appearance of bladder, however UA negative

## 2021-07-12 NOTE — SOCIAL WORK
Palliative LSW saw patient at the bedside today  LSW appreciates the opportunity to provide patient/family with inpatient emotional support and guidance while patient continues to receive medical attention from the medical team     Topics discussed: Patient had initially requested hospice  Discussion had with patient, son and son over the phone Jae Patton)  Patient agreeable to placement in STR with the intention of getting stronger so that she can return home  Patient is receiving caregivers through [facility] M,F, Saturday from 8-12 and Tues/Thurs 1:30-5:30  On Tues/Thurs morning the neighbor comes for about 1 hour to help patient get up for the day  Patient is going to get a sit 2 stand, LSW suggested working with PT for safety with new device  Areas that need follow-up: Ongoing support  Resources given: Contact information for the [facility] team  Others present:  Patient's son and [facility] Provider, Dr Cathey Phalen       LSW will continue to follow as requested by the medical team, patient, or family

## 2021-07-12 NOTE — CONSULTS
Consultation - Neurology   Reynold Ramsey 80 y o  female MRN: [id number]  Unit/Bed#: -01 Encounter: [id number]      Assessment/Plan     Parkinson's disease [facility])  Assessment & Plan  80year old female with HLD, Parkinson's disease admitted with feeling unwell and having a difficult time moving in the AM  Upon discussion with patient, she notes significant dizziness, nausea, constipation and feels as though her symptoms are not improved at all with her medications  She notes she is frustrated with her symptoms and her general condition and is not sure if she would want to make any medication adjustments  She notes she is interested in discussing hospice  Plan:   - Discussed with patient and her son at bedside, will involve palliative care team at patient's request to discuss what options she may have available to her  - Neurology team will reach out to patient's primary neurologist to discuss additional medication options, will discuss whether patient may benefit from an overnight dose of Sinemet vs an extended release dose prior to going to bed  - Will check orthostatic vital signs    - Continue on home medications  Discussed with patient's daughter via telephone and patient receives Carbidopa/Levodopa 25/100 2 tabs at 0730, 1 5 tabs at 1130, 1 5 tabs at 1630 and 1 5 tabs at 2130  She also takes Carbidopa 25 mg QID 30 minutes prior to eating  Orders adjusted in Epic to reflect this schedule  7/12/2021 1620 Addendum: Discussed with patient and also her daughter via telephone  They are agreeable to trial of extended release Sinemet at bedtime  Will adjust timing of daytime doses as follows: 2 tabs at 0730, 1 5 tabs at 1100, 1430, and 1800  Will add Sinemet 25/100 mg CR dose 1 tab at bedtime  Will continue to monitor response   - Recommend PT/OT evaluations  - Monitor exam and notify with changes  Recommendations for outpatient neurological follow up have yet to be determined      History of Present Illness     Reason for Consult / Principal Problem: Ambulatory dysfunction/Parkinson's disease    HPI: Odalys Pace is a 80 y o   female with HLD, Parkinson's disease, basal cell CA of right cheek who presents with complaints of feeling unwell  To review, patient follows with PCP for HLD and was recently seen on June 9, 2021 regarding urinary frequency  Urinalysis was equivocal and she was started on Bactrim to treat for possible UTI  She was seen in the ED on July 3, 2021 for constipation and was given a magnesium citrate enema with some improvement  She was prescribed GoLYTELY and advised to follow-up with PCP and GI team  She previously followed with Dr Richard Jackson for Parkinson's disease and was last seen in November 2020  At that time, she was continuing to have difficulty with dyskinesias and gait dysfunction  Azilect was discontinued at that time as she did not feel it had ever helped  She was prescribed Carbidopa 25 mg QID and continued on Carbidpa/Levodopa 25/100 1 tab 8AM, 1 tab 12PM, 1 tab 6PM, and 1 tab 10PM  Patient continued to complain of dizziness and was referred to ENT  She began seeing a neurologist at [facility] in May 2021  Medications adjustments were recommended and patient was to titrate Carbidopa/Levodopa 25/100 mg to goal dose of 2 tab at 8AM, 2 tab at 12, 2 tab at [facility] and 2 tab at 10PM  She was also to continue with Carbidopa QID  At that visit, she noted that she had stomach upset with Azilect, no benefit with Ramonita Aloe and Gocovri made dizziness worse  Patient's family called the neurology office at [facility] in June 2021 and noted that she was having increased jitteryness with the titration of carbidopa/levodopa and it was recommended that she alternate doses of the medication 1 tab for one dose and then 1 5 tabs at the next dose  Zofran was also ordered for nausea   Per discussion with patient's family, she has been taking carbidopa/levodopa 25/100 2 tabs at 0730, 1 5 tabs at 1130, 1630 and 2130  She also takes Carbidopa 25 mg QID before eating  Patient notes that she has been feeling unwell and does not feel any of her medications are helping her  She notes she feels dizzy all the time and it can occur even at rest  She notes that she is having constipation which has significant  Her family notes that they brought her to the hospital as she woke up and was not able to move in the morning  They note that this occurred for several days and prompted them to bring her to the hospital  Patient notes that she does not feel any of her medications help her Parkinson's symptoms and that she has tremors frequently  She has previously trialed several other medications which were also not helpful  She also notes that she has had occasional jerking movements but these are not bothersome  Patient expresses interest in hospice care as she is frustrated with her quality of life and does not feel anything has helped her  She notes she has had Parkinson's disease for several years and feels symptoms are progressively worsening  Inpatient consult to Neurology  Consult performed by: Kirill Fay PA-C  Consult ordered by: Brunilda Tena MD          Review of Systems   Constitutional: Positive for fatigue  Negative for chills and fever  HENT: Negative for trouble swallowing  Eyes: Positive for visual disturbance  Respiratory: Negative for shortness of breath  Cardiovascular: Negative for chest pain  Gastrointestinal: Positive for abdominal pain and nausea  Negative for vomiting  Genitourinary: Negative for difficulty urinating  Musculoskeletal: Positive for gait problem  Negative for back pain and neck pain  Skin: Negative for rash  Neurological: Positive for dizziness, tremors and light-headedness  Negative for weakness and numbness  Psychiatric/Behavioral: Negative for confusion         Historical Information   Past Medical History:   Diagnosis Date    Bruit of left carotid artery     Cervicalgia     Constipation     CTS (carpal tunnel syndrome)     right    Hypercholesteremia     Nausea     Parkinson's disease ([address] )     Tremor      Past Surgical History:   Procedure Laterality Date    BREAST LUMPECTOMY Bilateral     COLONOSCOPY      LUMBAR FUSION      NEUROPLASTY / TRANSPOSITION MEDIAN NERVE AT CARPAL TUNNEL Right      Social History   Social History     Substance and Sexual Activity   Alcohol Use Never     Social History     Substance and Sexual Activity   Drug Use Never     E-Cigarette/Vaping    E-Cigarette Use Never User      E-Cigarette/Vaping Substances    Nicotine No     THC No     CBD No     Flavoring No     Other No     Unknown No      Social History     Tobacco Use   Smoking Status Never Smoker   Smokeless Tobacco Never Used     Family History:   Family History   Problem Relation Age of Onset    Cancer Mother     No Known Problems Father     Diabetes Sister     Heart disease Sister        Review of previous medical records was completed  Please see HPI      Meds/Allergies   Scheduled Meds:  Current Facility-Administered Medications   Medication Dose Route Frequency Provider Last Rate    acetaminophen  325 mg Oral Q6H PRN Gwen Van MD      aspirin  81 mg Oral Daily Gwen Van MD      bisacodyl  10 mg Rectal Daily PRN Gwen Van MD      carbidopa  25 mg Oral 4x Daily Gwen Van MD      carbidopa-levodopa  1 tablet Oral 4x Daily Gwen Van MD      docusate sodium  100 mg Oral BID Gwen Van MD      enoxaparin  40 mg Subcutaneous Daily Gwen Van MD      polyethylene glycol  17 g Oral Daily Gwen Van MD      psyllium  1 packet Oral Daily Gwen Van MD      senna  25 8 mg Oral Daily PRN Gwen Van MD       Continuous Infusions:   PRN Meds:   acetaminophen    bisacodyl    senna      No Known Allergies    Objective   Vitals:Blood pressure (!) 171/92, pulse 79, temperature 97 8 °F (36 6 °C), resp  rate 18, height 4' 10" (1 473 m), weight 45 kg (99 lb 3 3 oz), SpO2 96 %  ,Body mass index is 20 73 kg/m²  Intake/Output Summary (Last 24 hours) at 7/12/2021 0800  Last data filed at 7/12/2021 0701  Gross per 24 hour   Intake 440 ml   Output 350 ml   Net 90 ml       Invasive Devices: Invasive Devices     Peripheral Intravenous Line            Peripheral IV 07/11/21 Left Arm 1 day                Physical Exam  Constitutional:       General: She is not in acute distress  Appearance: Normal appearance  She is not ill-appearing, toxic-appearing or diaphoretic  HENT:      Head: Normocephalic and atraumatic  Mouth/Throat:      Mouth: Mucous membranes are moist       Pharynx: Oropharynx is clear  No oropharyngeal exudate or posterior oropharyngeal erythema  Eyes:      General: No scleral icterus  Right eye: No discharge  Left eye: No discharge  Extraocular Movements: Extraocular movements intact  Conjunctiva/sclera: Conjunctivae normal       Pupils: Pupils are equal, round, and reactive to light  Cardiovascular:      Rate and Rhythm: Normal rate and regular rhythm  Pulmonary:      Effort: Pulmonary effort is normal       Breath sounds: Normal breath sounds  Abdominal:      General: There is no distension  Palpations: Abdomen is soft  Tenderness: There is no abdominal tenderness  Musculoskeletal:         General: Normal range of motion  Cervical back: Normal range of motion and neck supple  Right lower leg: No edema  Left lower leg: No edema  Skin:     General: Skin is warm and dry  Findings: No erythema or rash  Neurological:      Mental Status: She is alert and oriented to person, place, and time  Psychiatric:         Mood and Affect: Mood normal          Speech: Speech normal          Behavior: Behavior normal        Neurologic Exam     Mental Status   Oriented to person, place, and time     Oriented to person  Oriented to place  Oriented to time  Attention: normal  Concentration: normal    Speech: speech is normal   Level of consciousness: alert  Knowledge: good  Speech is hypophonic  Cranial Nerves     CN II   Right visual field deficit: none  Left visual field deficit: none     CN III, IV, VI   Pupils are equal, round, and reactive to light  Right pupil: Size: 4 mm  Shape: regular  Reactivity: brisk  Consensual response: intact  Left pupil: Size: 4 mm  Shape: regular  Reactivity: brisk  Consensual response: intact  Nystagmus: none   Diplopia: none  Ophthalmoparesis: none  Upgaze: normal  Downgaze: normal  Conjugate gaze: present    CN V   Right facial sensation deficit: none  Left facial sensation deficit: none    CN VII   Right facial weakness: none  Left facial weakness: none    CN VIII   Hearing: intact    CN IX, X   Palate: symmetric    CN XI   Right sternocleidomastoid strength: normal  Left sternocleidomastoid strength: normal    CN XII   Tongue: not atrophic  Fasciculations: absent  Tongue deviation: none    Motor Exam   Muscle bulk: normal  Overall muscle tone: normal  Right arm tone: increased  Left arm tone: increased  Right arm pronator drift: absent  Left arm pronator drift: absent  Right leg tone: increased  Left leg tone: increasedAge appropriate power noted x 4 extremities  Moderate bradykinesia with decrement noted with finger taps  Sensory Exam   Right arm light touch: normal  Left arm light touch: normal  Right leg light touch: normal  Left leg light touch: normal    Gait, Coordination, and Reflexes     Tremor   Resting tremor: present (B/L)Limited assessment secondary to patient noting discomfort due to having constipation         Lab Results:   Recent Results (from the past 24 hour(s))   UA w Reflex to Microscopic w Reflex to Culture    Collection Time: 07/11/21  8:36 AM    Specimen: Urine, Other   Result Value Ref Range    Color, UA Yellow     Clarity, UA Clear Specific [place], UA 1 010 1 003 - 1 030    pH, UA 6 5 4 5, 5 0, 5 5, 6 0, 6 5, 7 0, 7 5, 8 0    Leukocytes, UA Negative Negative    Nitrite, UA Negative Negative    Protein, UA Negative Negative mg/dl    Glucose, UA Negative Negative mg/dl    Ketones, UA Negative Negative mg/dl    Urobilinogen, UA 0 2 0 2, 1 0 E U /dl E U /dl    Bilirubin, UA Negative Negative    Blood, UA Negative Negative   CBC and differential    Collection Time: 07/12/21  5:40 AM   Result Value Ref Range    WBC 6 28 4 31 - 10 16 Thousand/uL    RBC 4 36 3 81 - 5 12 Million/uL    Hemoglobin 13 9 11 5 - 15 4 g/dL    Hematocrit 41 8 34 8 - 46 1 %    MCV 96 82 - 98 fL    MCH 31 9 26 8 - 34 3 pg    MCHC 33 3 31 4 - 37 4 g/dL    RDW 11 9 11 6 - 15 1 %    MPV 9 2 8 9 - 12 7 fL    Platelets 420 322 - 876 Thousands/uL    nRBC 0 /100 WBCs    Neutrophils Relative 68 43 - 75 %    Immat GRANS % 1 0 - 2 %    Lymphocytes Relative 18 14 - 44 %    Monocytes Relative 11 4 - 12 %    Eosinophils Relative 1 0 - 6 %    Basophils Relative 1 0 - 1 %    Neutrophils Absolute 4 35 1 85 - 7 62 Thousands/µL    Immature Grans Absolute 0 04 0 00 - 0 20 Thousand/uL    Lymphocytes Absolute 1 15 0 60 - 4 47 Thousands/µL    Monocytes Absolute 0 66 0 17 - 1 22 Thousand/µL    Eosinophils Absolute 0 03 0 00 - 0 61 Thousand/µL    Basophils Absolute 0 05 0 00 - 0 10 Thousands/µL   Basic metabolic panel    Collection Time: 07/12/21  5:40 AM   Result Value Ref Range    Sodium 138 136 - 145 mmol/L    Potassium 3 7 3 5 - 5 3 mmol/L    Chloride 103 100 - 108 mmol/L    CO2 25 21 - 32 mmol/L    ANION GAP 10 4 - 13 mmol/L    BUN 15 5 - 25 mg/dL    Creatinine 0 63 0 60 - 1 30 mg/dL    Glucose 95 65 - 140 mg/dL    Calcium 8 8 8 3 - 10 1 mg/dL    eGFR 81 ml/min/1 73sq m       Imaging Studies: No new neuro imaging available for review       VTE Prophylaxis: Enoxaparin (Lovenox)

## 2021-07-12 NOTE — ASSESSMENT & PLAN NOTE
80year old female with HLD, Parkinson's disease admitted with feeling unwell and having a difficult time moving in the AM  Upon discussion with patient, she notes significant dizziness, nausea, constipation and feels as though her symptoms are not improved at all with her medications  She notes she is frustrated with her symptoms and her general condition and is not sure if she would want to make any medication adjustments  She notes she is interested in discussing hospice  Plan:   - Discussed with patient and her son at bedside, will involve palliative care team at patient's request to discuss what options she may have available to her  - Neurology team will reach out to patient's primary neurologist to discuss additional medication options, will discuss whether patient may benefit from an overnight dose of Sinemet vs an extended release dose prior to going to bed  - Will check orthostatic vital signs    - Continue on home medications  Discussed with patient's daughter via telephone and patient receives Carbidopa/Levodopa 25/100 2 tabs at 0730, 1 5 tabs at 1130, 1 5 tabs at 1630 and 1 5 tabs at 2130  She also takes Carbidopa 25 mg QID 30 minutes prior to eating  Orders adjusted in Epic to reflect this schedule  7/12/2021 1620 Addendum: Discussed with patient and also her daughter via telephone  They are agreeable to trial of extended release Sinemet at bedtime  Will adjust timing of daytime doses as follows: 2 tabs at 0730, 1 5 tabs at 1100, 1430, and 1800  Will add Sinemet 25/100 mg CR dose 1 tab at bedtime  Will continue to monitor response   - Recommend PT/OT evaluations  - Monitor exam and notify with changes

## 2021-07-12 NOTE — ASSESSMENT & PLAN NOTE
· Mild,    · Reporting intermittent shortness of breath on exertion   · Saturating high 90s on RA  · ECHO ordered, follow up results to r/o any CHF  · Troponin negative

## 2021-07-12 NOTE — PLAN OF CARE
Problem: PHYSICAL THERAPY ADULT  Goal: Performs mobility at highest level of function for planned discharge setting  See evaluation for individualized goals  Description: Treatment/Interventions: Functional transfer training, LE strengthening/ROM, Therapeutic exercise, Elevations, Endurance training, Patient/family training, Bed mobility, Gait training, Spoke to nursing, Family  Equipment Recommended: Linard Bernheim       See flowsheet documentation for full assessment, interventions and recommendations  Note: Prognosis: Good  Problem List: Decreased strength, Decreased endurance, Impaired balance, Decreased mobility, Impaired sensation  Assessment: Pt is 80year old female seen for PT evaluation s/p admit to [facility] on 7/11/2021 with Ambulatory dysfunction  PT consulted to assess pt's functional mobility and d/c needs  Order placed for PT eval and tx, with ambulate patient order  Comorbidities affecting pt's physical performance at time of assessment include Parkinson's disease, elevated BNP level, and constipation  PTA, pt was independent with all functional mobility with a rollator  Personal factors affecting pt at time of IE include stairs to enter home, inability to ambulate household distances, inability to navigate community distances, inability to navigate level surfaces without external assistance, unable to perform dynamic tasks in community, inability to perform IADLs, and inability to perform ADLs  Please find objective findings from PT assessment regarding body systems outlined above with impairments and limitations including weakness, impaired balance, decreased endurance, gait deviations, decreased activity tolerance, decreased functional mobility tolerance, altered sensation, and fall risk  The following objective measures performed on IE also reveal limitations: Barthel Index: 55/100 and Modified [place]: 4 (moderate/severe disability)   Pt's clinical presentation is currently unstable/unpredictable seen in pt's presentation of need for ongoing medical management/monitoring, pt is a fall risk, and pt requires cues/assist for safety with functional mobility  Pt to benefit from continued PT tx to address deficits as defined above and maximize level of functional independent mobility and consistency  From PT/mobility standpoint, recommendation at time of d/c would be STR pending progress in order to facilitate return to PLOF  Barriers to Discharge: Inaccessible home environment     PT Discharge Recommendation: Post acute rehabilitation services     PT - OK to Discharge: Yes (when medically cleared, if to STR)    See flowsheet documentation for full assessment

## 2021-07-12 NOTE — CONSULTS
Consultation - [address] [facility] Leena 80 y o  female MRN: [id number]  Unit/Bed#: -01 Encounter: [id number]      Physician Requesting Consult: Deandre Sahu MD  Reason for Consult / Principal Problem: goals of care, hospice discussion      Assessment/Plan:  1  Parkinson's disease  2  Ambulatory dysfunction, debility  3  Constipation  4  Depressed mood  5  Goals of care  -continue current medical care/optimization  -continue constipation regimen   -colace 100mg bid   -miralax daily   -metamucil daily   -dulcolax supp 10mg daily prn   -senna 25 8mg daily prn  -confirmed level 3, DNAR/DNI code status  -patient agreeable to rehab with hope of returning home  -goals clear, continue ongoing treatments/hospitalizations; if abrupt or progressive decline, unable to tolerate rehab, or develops additional symptoms/life limiting conditions will revisit goals and possible hospice discussion if has qualifying criteria at the time  -d/w primary  -will follow peripherally    Met with patient, son from [place] and son/Toya on phone with palliative SW  Introduced palliative care service, role in care, sometimes a bridge to hospice  Patient wants to go home with hospice because she feels her quality of life has diminished  Patient admits that she sometimes feels depressed regarding her condition, but has been able to experience some timo  She is uncertain if she would request hospice if she had better functioning or if her constipation was resolved  She is uncertain about returning to hospital from treatments and care  We discussed hospice services extensively and the criteria to qualify for hospice  Patient has Parkinson's disease which has caused a decrease in her functional abilities and quality of life  She states that sometimes her tremors cause a lot of difficulty  Son reports that these increase as her medications are wearing off and she is due for next dose   Patient is fully conversant, speaking and answering in complete sentences, and has capacity to engage in goc discussions  She lives with her son/Toya  She is able to feed herself, dress herself admitting to some difficulties with getting pants on properly  There are private hire aides through [address] [place] [address] that come to the home to assist in her care, helping to get her up for the day, preparing meals, and helping with bathing needs although patient states she is able to complete most tasks independently after they have prepared them for her  She denies recent frequent infections or hospitalizations  She denies incontinence of bowel or bladder, although admits to small dribbling accidents on occasion  She is able to express when she needs to void  She has some difficulty with ambulation and uses a sitting walker  This is the source of much frustration as she enjoys getting outside and going places  She feels that this has been the most diminishing to her quality of life  She does not have a wheelchair, but admits that this might help improve her mood in getting outside and going places  She is planning on getting a sit 2 stand chair  She denies any wounds or skin breakdown  She has a good appetite and has had no difficulty with swallowing  We discussed that in her current state she would not qualify for hospice  We discussed transitioning to hospice if she experiences an abrupt decline in her condition or develops additional contributing symptoms/conditions that may limit her life  We discussed that hospice can always provide further information or assess patient for qualification in the future if patient/family desires  Sons agree with plan to try rehab and believe patient will feel better if she gets stronger/regains function  They state patient's daughter is also hoping patient tries rehab  Although she admits to a depressed mood regarding her condition, she does not want to add additional medications at this time   She is willing to try rehab with the hope of regaining strength/function to return home  She is also hoping her constipation improves so that her abdominal discomfort improves, which she believes with improve her mood and outlook  Emotional support and validation provided  Code Status: Level 3 - DNAR and DNI  Advance Directive and Living Will:    Not in chart  Power of :    POLST:     Lives with son/Toya  Daughter/Karen Ngo- [phone]  Another son in [place], present at bedside today  [facility]  [facility]      History of Present Illness   HPI: Odalys Pace is a 80y o  year old female who presents with generalized weakness, falls, ambulatory dysfunction for a few weeks, and abdominal pain with shortness of breath  Patient with PMH significant for Parkinson's disease on carbidopa/levodopa, neuropathy, ambulatory dysfunction/ambulates with walker  Per notes, patient was to be placed in [place] in 2 weeks  Patient with ER visit 7/3/21 for constipation, sent home on medication  CT imaging on this admission shows slight improvement in constipation  Patient requesting to be placed on hospice per notes  Consulted for goals of care, hospice discussion  Review of Systems   Constitutional: Positive for activity change and fatigue  Gastrointestinal: Positive for abdominal pain and nausea  Musculoskeletal: Positive for gait problem  Neurological: Positive for weakness  Psychiatric/Behavioral: Positive for dysphoric mood  All other systems reviewed and are negative        Historical Information   Past Medical History:   Diagnosis Date    Bruit of left carotid artery     Cervicalgia     Constipation     CTS (carpal tunnel syndrome)     right    Hypercholesteremia     Nausea     [facility]'s disease ([facility] [address] [facility]     Tremor      Patient Active Problem List   Diagnosis    [facility]'s disease ([facility] [address] [facility]    Idiopathic peripheral neuropathy    Carpal tunnel syndrome of left wrist    Medicare annual wellness visit, subsequent    Mixed hyperlipidemia    Basal cell carcinoma (BCC) of right cheek    Frequency of urination    Arterial hypotension    Ambulatory dysfunction    Elevated brain natriuretic peptide (BNP) level    Constipation     Past Surgical History:   Procedure Laterality Date    BREAST LUMPECTOMY Bilateral     COLONOSCOPY      LUMBAR FUSION      NEUROPLASTY / TRANSPOSITION MEDIAN NERVE AT CARPAL TUNNEL Right      Social History     Socioeconomic History    Marital status:      Spouse name: None    Number of children: None    Years of education: None    Highest education level: None   Occupational History    None   Tobacco Use    Smoking status: Never Smoker    Smokeless tobacco: Never Used   Vaping Use    Vaping Use: Never used   Substance and Sexual Activity    Alcohol use: Never    Drug use: Never    Sexual activity: Not Currently   Other Topics Concern    None   Social History Narrative    None     Social Determinants of Health     Financial Resource Strain:     Difficulty of Paying Living Expenses:    Food Insecurity:     Worried About Running Out of Food in the Last Year:     [address] in the Last Year:    Transportation Needs:     Lack of Transportation (Medical):      Lack of Transportation (Non-Medical):    Physical Activity:     Days of Exercise per Week:     Minutes of Exercise per Session:    Stress:     Feeling of Stress :    Social Connections:     Frequency of Communication with Friends and Family:     Frequency of Social Gatherings with Friends and Family:     Attends [employer] Services:     Active Member of Clubs or Organizations:     Attends Club or Organization Meetings:     Marital Status:    Intimate Partner Violence:     Fear of Current or Ex-Partner:     Emotionally Abused:     Physically Abused:     Sexually Abused:      Family History   Problem Relation Age of Onset    Cancer Mother     No Known Problems Father  Diabetes Sister     Heart disease Sister        Meds/Allergies   all current active meds have been reviewed and current meds:   Current Facility-Administered Medications   Medication Dose Route Frequency    acetaminophen (TYLENOL) tablet 325 mg  325 mg Oral Q6H PRN    aspirin (ECOTRIN LOW STRENGTH) EC tablet 81 mg  81 mg Oral Daily    bisacodyl (DULCOLAX) rectal suppository 10 mg  10 mg Rectal Daily PRN    carbidopa (LODOSYN) tablet 1 tablet  25 mg Oral 4x Daily    carbidopa-levodopa (SINEMET)  mg per tablet 1 5 tablet  1 5 tablet Oral TID    [START ON 7/13/2021] carbidopa-levodopa (SINEMET)  mg per tablet 2 tablet  2 tablet Oral Daily    docusate sodium (COLACE) capsule 100 mg  100 mg Oral BID    enoxaparin (LOVENOX) subcutaneous injection 40 mg  40 mg Subcutaneous Daily    polyethylene glycol (MIRALAX) packet 17 g  17 g Oral Daily    psyllium (METAMUCIL) 1 packet  1 packet Oral Daily    senna (SENOKOT) tablet 25 8 mg  25 8 mg Oral Daily PRN       No Known Allergies    I have reviewed the patient's controlled substance dispensing history in the Prescription Drug Monitoring Program in compliance with the [facility] regulations before prescribing any controlled substances  Objective   /81   Pulse 79   Temp 97 8 °F (36 6 °C)   Resp 18   Ht 4' 10" (1 473 m)   Wt 45 kg (99 lb 3 3 oz)   SpO2 96%   BMI 20 73 kg/m²   Physical Exam  Vitals and nursing note reviewed  Constitutional:       General: She is not in acute distress  Appearance: She is not ill-appearing, toxic-appearing or diaphoretic  HENT:      Head: Normocephalic and atraumatic  Nose: Nose normal       Mouth/Throat:      Mouth: Mucous membranes are moist       Pharynx: Oropharynx is clear  Eyes:      General: No scleral icterus  Extraocular Movements: Extraocular movements intact  Cardiovascular:      Rate and Rhythm: Normal rate     Pulmonary:      [place]: Pulmonary effort is normal  No respiratory distress  Breath sounds: No stridor  Abdominal:      General: There is distension (mild)  Palpations: Abdomen is soft  Tenderness: There is no abdominal tenderness  Musculoskeletal:         General: No swelling or tenderness  Normal range of motion  Right lower leg: No edema  Left lower leg: No edema  Skin:     General: Skin is warm and dry  Neurological:      General: No focal deficit present  Mental Status: She is alert and oriented to person, place, and time  Mental status is at baseline  Psychiatric:         Mood and Affect: Mood normal          Behavior: Behavior normal          Thought Content: Thought content normal          Judgment: Judgment normal          Lab Results:   I have personally reviewed pertinent labs  , CBC:   Lab Results   Component Value Date    WBC 6 28 07/12/2021    HGB 13 9 07/12/2021    HCT 41 8 07/12/2021    MCV 96 07/12/2021     07/12/2021    MCH 31 9 07/12/2021    MCHC 33 3 07/12/2021    RDW 11 9 07/12/2021    MPV 9 2 07/12/2021    NRBC 0 07/12/2021   , CMP:   Lab Results   Component Value Date    SODIUM 138 07/12/2021    K 3 7 07/12/2021     07/12/2021    CO2 25 07/12/2021    BUN 15 07/12/2021    CREATININE 0 63 07/12/2021    CALCIUM 8 8 07/12/2021    EGFR 81 07/12/2021   , PT/PTT:No results found for: PT, PTT   Albumin: 3 9  Imaging Studies: I have personally reviewed pertinent reports  EKG, Pathology, and Other Studies: I have personally reviewed pertinent reports  QT/QTc: 374/412    Counseling / Coordination of Care  Total floor / unit time spent today 75 minutes  Greater than 50% of total time was spent with the patient and / or family counseling and / or coordination of care  A description of the counseling / coordination of care: current condition, hospice discussion/qualification, goals of care      Aniyah Gonzales DO

## 2021-07-12 NOTE — ASSESSMENT & PLAN NOTE
· Pt with hx of parkison's disease   · Maintained on Sinemet 25/100 mg QID and carbidopa QID for nausea   · Typically walks with walker at baseline, but has had difficulties with ambulation over last several weeks  · Follows with neurology outpatient with [facility]   · Obtain neurology consultation   · PT/OT consultations recommending STR  · Pt is requesting to be placed on hospice, however discussed with patient and family unlikely that she has a diagnosis that would qualify her to hospice  Would recommend palliative care consultation for the patient for symptomatic management and discussion of goals of care

## 2021-07-13 LAB — SARS-COV-2 RNA RESP QL NAA+PROBE: NEGATIVE

## 2021-07-13 PROCEDURE — 99231 SBSQ HOSP IP/OBS SF/LOW 25: CPT | Performed by: PHYSICIAN ASSISTANT

## 2021-07-13 PROCEDURE — U0005 INFEC AGEN DETEC AMPLI PROBE: HCPCS | Performed by: PHYSICIAN ASSISTANT

## 2021-07-13 PROCEDURE — U0003 INFECTIOUS AGENT DETECTION BY NUCLEIC ACID (DNA OR RNA); SEVERE ACUTE RESPIRATORY SYNDROME CORONAVIRUS 2 (SARS-COV-2) (CORONAVIRUS DISEASE [COVID-19]), AMPLIFIED PROBE TECHNIQUE, MAKING USE OF HIGH THROUGHPUT TECHNOLOGIES AS DESCRIBED BY CMS-2020-01-R: HCPCS | Performed by: PHYSICIAN ASSISTANT

## 2021-07-13 RX ADMIN — ENOXAPARIN SODIUM 40 MG: 40 INJECTION SUBCUTANEOUS at 08:23

## 2021-07-13 RX ADMIN — CARBIDOPA AND LEVODOPA 1 TABLET: 25; 100 TABLET, EXTENDED RELEASE ORAL at 22:20

## 2021-07-13 RX ADMIN — CARBIDOPA AND LEVODOPA 1.5 TABLET: 25; 100 TABLET ORAL at 18:25

## 2021-07-13 RX ADMIN — DOCUSATE SODIUM 100 MG: 100 CAPSULE, LIQUID FILLED ORAL at 18:25

## 2021-07-13 RX ADMIN — CARBIDOPA AND LEVODOPA 1.5 TABLET: 25; 100 TABLET ORAL at 14:45

## 2021-07-13 RX ADMIN — POLYETHYLENE GLYCOL 3350 17 G: 17 POWDER, FOR SOLUTION ORAL at 08:23

## 2021-07-13 RX ADMIN — DOCUSATE SODIUM 100 MG: 100 CAPSULE, LIQUID FILLED ORAL at 08:23

## 2021-07-13 RX ADMIN — CARBIDOPA AND LEVODOPA 2 TABLET: 25; 100 TABLET ORAL at 08:22

## 2021-07-13 RX ADMIN — PSYLLIUM HUSK 1 PACKET: 3.4 POWDER ORAL at 08:23

## 2021-07-13 RX ADMIN — CARBIDOPA AND LEVODOPA 1.5 TABLET: 25; 100 TABLET ORAL at 11:52

## 2021-07-13 RX ADMIN — ASPIRIN 81 MG: 81 TABLET, DELAYED RELEASE ORAL at 08:22

## 2021-07-13 NOTE — PROGRESS NOTES
[address] [facility]  Progress Note - Reynold Ramsey 1935, 80 y o  female MRN: [id number]  Unit/Bed#: -[place] Encounter: [id number]  Primary Care Provider: Sheela Lockett MD   Date and time admitted to hospital: 7/11/2021  7:20 AM      DOS: 7/13/2021  * Ambulatory dysfunction  Assessment & Plan  · Pt presented to the hospital with generalized weakness and ambulatory dysfunction   · Hx of parkinson's disease with peripheral neuropathy    · Reports episodic dizziness, she believes her sinemet is contributing to her symptoms   · Neurology following, adjusted her Sinemet dose as below  · PT/OT recommending STR, CM following for placement, pt and family in agreement   · Fall precautions   · Supportive care  · CT on admission mainly unremarkable, improved constipation  Abnormal appearance of bladder, however UA negative    Parkinson's disease ([facility] [address] )  Assessment & Plan  · Pt with hx of parkison's disease   · Maintained on Sinemet 25/100 mg QID and carbidopa QID for nausea prior to eating  · Typically walks with walker at baseline, but has had difficulties with ambulation over last several weeks  · Follows with neurology outpatient with [facility]   · Neurology following here,  · Started on extended release Sinemet at bedtime in addition to previous Sinemet regimen   · No additional inpatient neuro recommendations at this time  · PT/OT consultations recommending STR  · Pt is requesting to be placed on hospice, palliative care evaluated, pt does not meet criteria for hospice placement currently  Plan to d/c to rehab with plan on returning home   Continue constipation/bowel regimen with colace, miralax, metamucil, senna and PRN dulcolax suppository    Constipation  Assessment & Plan  · Pt with history of abdominal pain and constipation, previous presentation to the hospital on 7/3 for similar complaints   · CT scan with improvement of constipation noted on imaging   · Continue miralax, colace and psyllium, PRN dulcolax suppository and senna   · Large bowel movement noted this AM,  on nursing documentation    Elevated brain natriuretic peptide (BNP) level  Assessment & Plan  · Mild,    · Reporting intermittent shortness of breath on exertion   · Saturating high 90s on RA  · Troponin negative  · Pt appears euvolemic currently  · ECHO with EF 60%, mild to moderate aortic stenosis   · Follow up with cardiology outpatient for further management of aortic stenosis       VTE Pharmacologic Prophylaxis:   Pharmacologic: Enoxaparin (Lovenox)  Mechanical VTE Prophylaxis in Place: Yes    Patient Centered Rounds: I have evaluated patient without nursing staff present due to Speaking to nurse outside patient's room    Discussions with Specialists or Other Care Team Provider:  Discussed with RN, cm and reviewed previous notes    Education and Discussions with Family / Patient:  Discussed with patient at bedside regarding plan of care, when asked to update friends or family members patient politely declined    Time Spent for Care: 20 minutes  More than 50% of total time spent on counseling and coordination of care as described above  Current Length of Stay: 2 day(s)    Current Patient Status: Inpatient   Certification Statement: The patient will continue to require additional inpatient hospital stay due to Awaiting short-term rehab placement    Discharge Plan:  Patient is medically stable awaiting short-term rehab, likely within the next 24 hours    Code Status: Level 3 - DNAR and DNI      Subjective:   Patient reports that she is feeling better today  She denies any chest or abdominal pain  Denies any shortness of breath, nausea or vomiting  States that she had a large bowel movement this morning and feels better      Objective:     Vitals:   Temp (24hrs), Av 7 °F (36 5 °C), Min:97 3 °F (36 3 °C), Max:98 °F (36 7 °C)    Temp:  [97 3 °F (36 3 °C)-98 °F (36 7 °C)] 97 8 °F (36 6 °C)  HR:  [66-86] 66  Resp:  [18] 18  BP: (128-170)/(64-86) 150/77  SpO2:  [96 %-98 %] 98 %  Body mass index is 20 73 kg/m²  Input and Output Summary (last 24 hours): Intake/Output Summary (Last 24 hours) at 7/13/2021 1035  Last data filed at 7/13/2021 0920  Gross per 24 hour   Intake 720 ml   Output 900 ml   Net -180 ml       Physical Exam:     Physical Exam  Vitals reviewed  Constitutional:       General: She is not in acute distress  Appearance: She is not toxic-appearing  Comments: Patient is in no acute distress lying in her hospital bed resting comfortably  Frail appearing elderly female   HENT:      Head: Normocephalic and atraumatic  Eyes:      Conjunctiva/sclera: Conjunctivae normal       Pupils: Pupils are equal, round, and reactive to light  Cardiovascular:      Rate and Rhythm: Normal rate and regular rhythm  Pulses: Normal pulses  Pulmonary:      [place]: Pulmonary effort is normal  No respiratory distress  Breath sounds: Normal breath sounds  No wheezing  Abdominal:      General: Bowel sounds are normal  There is no distension  Palpations: Abdomen is soft  Tenderness: There is no abdominal tenderness  There is no guarding  Musculoskeletal:      Right lower leg: No edema  Left lower leg: No edema  Skin:     General: Skin is warm and dry  Findings: No erythema  Neurological:      Mental Status: She is alert     Psychiatric:         Mood and Affect: Mood normal          Additional Data:     Labs:    Results from last 7 days   Lab Units 07/12/21  0540   WBC Thousand/uL 6 28   HEMOGLOBIN g/dL 13 9   HEMATOCRIT % 41 8   PLATELETS Thousands/uL 247   NEUTROS PCT % 68   LYMPHS PCT % 18   MONOS PCT % 11   EOS PCT % 1     Results from last 7 days   Lab Units 07/12/21  0540 07/11/21  0743   POTASSIUM mmol/L 3 7 4 3   CHLORIDE mmol/L 103 104   CO2 mmol/L 25 26   BUN mg/dL 15 15   CREATININE mg/dL 0 63 0 70   CALCIUM mg/dL 8 8 8 9   ALK PHOS U/L  --  69   ALT U/L  --  14   AST U/L  -- 11           * I Have Reviewed All Lab Data Listed Above  * Additional Pertinent Lab Tests Reviewed: All Labs Within Last 24 Hours Reviewed    Imaging:    Imaging Reports Reviewed Today Include:  CT chest abdomen pelvis  Imaging Personally Reviewed by Myself Includes:  None    Recent Cultures (last 7 days):           Last 24 Hours Medication List:   Current Facility-Administered Medications   Medication Dose Route Frequency Provider Last Rate    acetaminophen  325 mg Oral Q6H PRN Gwen Van MD      aspirin  81 mg Oral Daily Gwen Van MD      bisacodyl  10 mg Rectal Daily PRN Gwen Van MD      carbidopa  25 mg Oral 4x Daily Lorenzo He PA-C      carbidopa-levodopa  1 tablet Oral HS Lorenzo He PA-C      carbidopa-levodopa  1 5 tablet Oral TID Lorenzo He PA-C      carbidopa-levodopa  2 tablet Oral Daily Lorenzo He PA-C      docusate sodium  100 mg Oral BID Gwen Van MD      enoxaparin  40 mg Subcutaneous Daily Gwen Van MD      polyethylene glycol  17 g Oral Daily Gwen Van MD      psyllium  1 packet Oral Daily Gwen Van MD      senna  25 8 mg Oral Daily PRN Gwen Van MD          Today, Patient Was Seen By: Paola Albrets PA-C    ** Please Note: Dictation voice to text software may have been used in the creation of this document   **

## 2021-07-13 NOTE — ASSESSMENT & PLAN NOTE
· Pt with history of abdominal pain and constipation, previous presentation to the hospital on 7/3 for similar complaints   · CT scan with improvement of constipation noted on imaging   · Continue miralax, colace and psyllium, PRN dulcolax suppository and senna   · Large bowel movement noted this AM, 7/13 on nursing documentation

## 2021-07-13 NOTE — ASSESSMENT & PLAN NOTE
· Pt with hx of parkison's disease   · Maintained on Sinemet 25/100 mg QID and carbidopa QID for nausea prior to eating  · Typically walks with walker at baseline, but has had difficulties with ambulation over last several weeks  · Follows with neurology outpatient with [facility]   · Neurology following here,  · Started on extended release Sinemet at bedtime in addition to previous Sinemet regimen   · No additional inpatient neuro recommendations at this time  · PT/OT consultations recommending STR  · Pt is requesting to be placed on hospice, palliative care evaluated, pt does not meet criteria for hospice placement currently  Plan to d/c to rehab with plan on returning home   Continue constipation/bowel regimen with colace, miralax, metamucil, senna and PRN dulcolax suppository

## 2021-07-13 NOTE — CASE MANAGEMENT
LOS 2 DAYS  PT IS NOT A 30 DAY RE-ADMIT  PT IS NOT A BUNDLE  PT IS NOT OBS    HRR 9    Cm met with the pt and her two sons, Yesenia Maria and Kenn at bedside to discuss dcp  The pt resides with her son Yesenia Maria in a 1 story home with 3 DWAYNE located in [place]  The pt uses a rollator and has assistance with her ADLs  The pt has caregivers from [place] [id number] on [address] [place] [address] 8am-12pm  Tuesday and Thursday 1:30pm-5:30pm   The pt does not have a hx of STR/VNA or MH/SA  The pt fills Rx [facility] in [place] and is able to afford her co-pays  The pt POA is Elio Paget  The pt is retired and her son transports her to and from hospitals  CM reviewed discharge planning process including the following: identifying caregivers at home, preference for d/c planning needs, availability of treatment team to discuss questions or concerns patient and/or family may have regarding diagnosis, plan of care, old or new medications and discharge planning  Discharge Checklist reviewed and CM will continue to monitor for progress toward discharge goals in nursing and provider rounds  The pt and family are interested in the pt going to STR  [facility]  CM has submitted the referrals for the pt

## 2021-07-13 NOTE — ASSESSMENT & PLAN NOTE
· Pt presented to the hospital with generalized weakness and ambulatory dysfunction   · Hx of parkinson's disease with peripheral neuropathy    · Reports episodic dizziness, she believes her sinemet is contributing to her symptoms   · Neurology following, adjusted her Sinemet dose as below  · PT/OT recommending STR, CM following for placement, pt and family in agreement   · Fall precautions   · Supportive care  · CT on admission mainly unremarkable, improved constipation   Abnormal appearance of bladder, however UA negative

## 2021-07-13 NOTE — ASSESSMENT & PLAN NOTE
· Mild,    · Reporting intermittent shortness of breath on exertion   · Saturating high 90s on RA  · Troponin negative  · Pt appears euvolemic currently  · ECHO with EF 60%, mild to moderate aortic stenosis   · Follow up with cardiology outpatient for further management of aortic stenosis

## 2021-07-14 VITALS
RESPIRATION RATE: 16 BRPM | OXYGEN SATURATION: 96 % | BODY MASS INDEX: 20.82 KG/M2 | WEIGHT: 99.21 LBS | HEART RATE: 68 BPM | TEMPERATURE: 98.3 F | SYSTOLIC BLOOD PRESSURE: 110 MMHG | HEIGHT: 58 IN | DIASTOLIC BLOOD PRESSURE: 62 MMHG

## 2021-07-14 PROBLEM — K59.00 CONSTIPATION: Status: RESOLVED | Noted: 2021-07-11 | Resolved: 2021-07-14

## 2021-07-14 PROCEDURE — 99239 HOSP IP/OBS DSCHRG MGMT >30: CPT | Performed by: PHYSICIAN ASSISTANT

## 2021-07-14 PROCEDURE — 97530 THERAPEUTIC ACTIVITIES: CPT

## 2021-07-14 PROCEDURE — 97116 GAIT TRAINING THERAPY: CPT

## 2021-07-14 PROCEDURE — 97110 THERAPEUTIC EXERCISES: CPT

## 2021-07-14 RX ORDER — DOCUSATE SODIUM 100 MG/1
100 CAPSULE, LIQUID FILLED ORAL 2 TIMES DAILY
Refills: 0
Start: 2021-07-14

## 2021-07-14 RX ORDER — CARBIDOPA AND LEVODOPA 25; 100 MG/1; MG/1
1 TABLET, EXTENDED RELEASE ORAL
Refills: 0
Start: 2021-07-14

## 2021-07-14 RX ADMIN — POLYETHYLENE GLYCOL 3350 7.5 G: 17 POWDER, FOR SOLUTION ORAL at 09:02

## 2021-07-14 RX ADMIN — PSYLLIUM HUSK 1 PACKET: 3.4 POWDER ORAL at 09:03

## 2021-07-14 RX ADMIN — CARBIDOPA AND LEVODOPA 1.5 TABLET: 25; 100 TABLET ORAL at 11:53

## 2021-07-14 RX ADMIN — ENOXAPARIN SODIUM 40 MG: 40 INJECTION SUBCUTANEOUS at 09:03

## 2021-07-14 RX ADMIN — ASPIRIN 81 MG: 81 TABLET, DELAYED RELEASE ORAL at 09:02

## 2021-07-14 RX ADMIN — CARBIDOPA AND LEVODOPA 2 TABLET: 25; 100 TABLET ORAL at 09:02

## 2021-07-14 RX ADMIN — DOCUSATE SODIUM 100 MG: 100 CAPSULE, LIQUID FILLED ORAL at 09:03

## 2021-07-14 NOTE — CASE MANAGEMENT
Cm was informed the pt is medically stable to dc  Cm followed up with the pt referral   Pt was denied at [facility] and approved at [facility]  CM reached out to the pt mauro Christianson to update them about the pt acceptance at [facility]  CM spoke with the pt    CM contacted admissions at [facility] and they are able to accept the pt today  CM called Phong Mcclelland at [facility] and requested a 2 pm transport for the pt  Cm updated the treatment team and will await confirmation of transport for the pt  IMM reviewed with patient   patient agree with discharge determination

## 2021-07-14 NOTE — ASSESSMENT & PLAN NOTE
· Pt with history of abdominal pain and constipation, previous presentation to the hospital on 7/3 for similar complaints   · CT scan with improvement of constipation noted on imaging   · Continue miralax, colace and psyllium, PRN dulcolax suppository and senna   · Large bowel movement noted on 7/13 on nursing documentation  · Continue regimen as above, adjust based on stool output at rehab

## 2021-07-14 NOTE — ASSESSMENT & PLAN NOTE
· Pt presented to the hospital with generalized weakness and ambulatory dysfunction   · Hx of parkinson's disease with peripheral neuropathy    · Reports episodic dizziness, she believes her sinemet is contributing to her symptoms  This has now resolved   · Neurology following, adjusted her Sinemet dose as below  · PT/OT recommending STR, CM following for placement, pt and family in agreement, to be discharged to [facility] [address] [address] today   · Fall precautions   · Supportive care  · CT on admission mainly unremarkable, improved constipation   Abnormal appearance of bladder, however UA negative

## 2021-07-14 NOTE — QUICK NOTE
Goals have been clarified  Patient has made significant clinical progress and does not require additional symptom management needs  Palliative Care will sign off at this time  Please do not hesitate to re-consult or contact on-call Palliative provider if needed

## 2021-07-14 NOTE — INCIDENTAL FINDINGS
The following findings require follow up:  Non-Radiographic finding   Finding: Mild to moderate aortic stenosis   Follow up required: Cardiology for surveillance    Follow up should be done within 1 month(s)    Please notify the following clinician to assist with the follow up:   Dr Matthews Dock

## 2021-07-14 NOTE — ASSESSMENT & PLAN NOTE
· Pt with hx of parkison's disease   · Maintained on Sinemet 25/100 mg QID and carbidopa QID for nausea prior to eating  · Typically walks with walker at baseline, but has had difficulties with ambulation over last several weeks, this is slowly improving now  · Follows with neurology outpatient with [facility]   · Neurology following here,  · Started on extended release Sinemet at bedtime in addition to previous Sinemet regimen   · No additional inpatient neuro recommendations at this time  · PT/OT consultations recommending STR  · Pt is requesting to be placed on hospice, palliative care evaluated, pt does not meet criteria for hospice placement currently  Plan to d/c to rehab with plan on returning home   Continue constipation/bowel regimen with colace, miralax, metamucil, senna and PRN dulcolax suppository

## 2021-07-14 NOTE — DISCHARGE INSTRUCTIONS
Please follow-up with your primary care provider within 1 week  Please follow-up with cardiology for evidence of aortic stenosis on echocardiogram  Please follow-up with Neurology for continued medication adjustments for your Parkinson's disease  If you begin to have any worsening weakness, shortness of breath please come back to the hospital for further evaluation  Constipation   WHAT YOU NEED TO KNOW:   What is constipation? Constipation is when you have hard, dry bowel movements, or you go longer than usual between bowel movements  What causes constipation? · Not enough water or high-fiber foods    · Lack of physical activity    · Certain medicines, such as opioid pain medicine, antidepressants, or high blood pressure medicine    · A medical condition such as hemorrhoids, diabetes, or a stroke    What are the signs and symptoms of constipation? · Difficulty pushing out your bowel movement    · Pain or bleeding during your bowel movement    · A feeling that you did not finish having your bowel movement    · Nausea    · Bloating    · Headache    How is constipation treated? Medicine can help you have a bowel movement more easily  Medicines may increase moisture in your bowel movement or increase the motion of your intestines  · A suppository  may be used to help soften your bowel movements  This may make them easier to pass  A suppository is guided into your rectum through your anus  · Laxatives  can help stimulate your bowels to have a bowel movement  Your provider may recommend you only use laxatives for a short time  Long-term use may make your bowels dependent on the medicine  · An enema  is liquid medicine used to clear bowel movement from your rectum  The medicine is put into your rectum through your anus  What can I do to prevent constipation? · Drink liquids as directed  You may need to drink extra liquids to help soften and move your bowels   Ask how much liquid to drink each day and which liquids are best for you  · Eat high-fiber foods  This may help decrease constipation by adding bulk to your bowel movements  High-fiber foods include fruit, vegetables, whole-grain breads and cereals, and beans  Your healthcare provider or dietitian can help you create a high-fiber meal plan  Your provider may also recommend a fiber supplement if you cannot get enough fiber from food  · Exercise regularly  Regular physical activity can help stimulate your intestines  Walking is a good exercise to prevent or relieve constipation  Ask which exercises are best for you  · Schedule a time each day to have a bowel movement  This may help train your body to have regular bowel movements  Bend forward while you are on the toilet to help move the bowel movement out  Sit on the toilet for at least 10 minutes, even if you do not have a bowel movement  · Talk to your healthcare provider about your medicines  Certain medicines, such as opioids, can cause constipation  Your provider may be able to make medicine changes  For example, he or she may change the kind of medicine, or change when you take it  When should I call my doctor? · You have blood in your bowel movements  · You have a fever and abdominal pain with the constipation  · Your constipation gets worse  · You start to vomit  · You have questions or concerns about your condition or care  CARE AGREEMENT:   You have the right to help plan your care  Learn about your health condition and how it may be treated  Discuss treatment options with your healthcare providers to decide what care you want to receive  You always have the right to refuse treatment  The above information is an  only  It is not intended as medical advice for individual conditions or treatments  Talk to your doctor, nurse or pharmacist before following any medical regimen to see if it is safe and effective for you    © Copyright [facility] [employer] Information is for [employer] use only and may not be sold, redistributed or otherwise used for commercial purposes  All illustrations and images included in CareNotes® are the copyrighted property of [employer]  or [address] [place] [address] YODIL Disease   AMBULATORY CARE:   Parkinson disease (PD)  is a long-term movement disorder  The brain cells that control movement start to die and cause changes in how you move, feel, and act  Even though Parkinson disease (PD) may progress and have a severe impact on your daily life, it is not a life-threatening disease  Symptoms often increase and get worse over time  Common signs and symptoms include the following:   · Tremors that go away when you move or sleep    · Difficulty moving or getting up from a seated position    · Difficulty with small movements, such as buttoning clothing or eating    · Decreased blinking and facial emotion    · Joint stiffness and jerky movements    · Difficulty keeping balance when standing or changing positions    · Shuffling or hunched position while walking    · Difficulty speaking and writing    Seek care immediately if:   · You feel like hurting or killing yourself or others  · You feel lightheaded, dizzy, or faint  · You have chest pain or shortness of breath  · You have weakness in an arm or leg  · You become confused, or you have difficulty speaking  · You have dizziness, a severe headache, or vision changes  Contact your healthcare provider or neurologist if:   · You have a fever  · You are not sleeping well or you sleep more than usual     · You cannot eat or are eating more than usual     · You feel that your condition is getting worse  · You have new symptoms since your last appointment  · Your sad feelings or thoughts change the way you function during the day  · You have questions or concerns about your condition or care      Treatment for PD  may include medicines to improve movement problems, such as muscle stiffness, twitches, and restlessness  Your healthcare provider may give you an injection to help your muscles relax  Deep brain stimulation surgery may be done to help decrease tremors and rigidity  Manage PD:   · Do not eat foods that are high in protein or dairy  They can cause problems with how some of your medicine works  Ask your healthcare provider how much protein and dairy is safe to eat  He may tell you to eat foods high in fiber to make it easier to have a bowel movement  Examples are cereals, beans, vegetables, and whole-grain breads  Ask if you need to be on a special diet  · Do not drive  unless your healthcare provider says it is okay  · Exercise regularly  A physical therapist teaches you exercises to help improve movement and strength, and to decrease pain  This may help you control your body movements, and keep your balance  · Go to occupational therapy  An occupational therapist teaches you skills to help with your daily activities  Your occupational therapist may help you choose equipment to help you at home and work  He can also suggest ways to keep your home and workplace safe  · Go to speech therapy  A speech therapist may work with you to help you improve your ability to talk or swallow  · Go to counseling  A mental health counselor can help you talk about your feeling about PD  Your family may attend meetings to learn new ways to take better care of both you and themselves  Follow up with your healthcare provider as directed:  Write down your questions so you remember to ask them during your visits  © Copyright [address] Information is for End User's use only and may not be sold, redistributed or otherwise used for commercial purposes  All illustrations and images included in CareNotes® are the copyrighted property of [employer]  or [facility][note 3] Tavon Nguyen   The above information is an  only   It is not intended as medical advice for individual conditions or treatments  Talk to your doctor, nurse or pharmacist before following any medical regimen to see if it is safe and effective for you

## 2021-07-14 NOTE — TRANSPORTATION MEDICAL NECESSITY
Section I - General Information    Name of Patient: Robbie Murillo                 : 1935    Medicare #: [id number]  Transport Date: 21 (PCS is valid for round trips on this date and for all repetitive trips in the 60-day range as noted below )  Origin: [facility]: [place]  Is the pt's stay covered under Medicare Part A (PPS/DRG)   [x]     Closest appropriate facility? If no, why is transport to more distant facility required? Yes  If hospice pt, is this transport related to pt's terminal illness? No       Section II - Medical Necessity Questionnaire  Ambulance transportation is medically necessary only if other means of transport are contraindicated or would be potentially harmful to the patient  To meet this requirement, the patient must either be "bed confined" or suffer from a condition such that transport by means other than ambulance is contraindicated by the patient's condition  The following questions must be answered by the medical professional signing below for this form to be valid:    1)  Describe the MEDICAL CONDITION (physical and/or mental) of this patient AT [address] [place] [address] that requires the patient to be transported in an ambulance and why transport by other means is contraindicated by the patient's condition:Decreased Strength, Decreased endurance, impaired balance, decreased mobility, impaired sensation    2) Is the patient "bed confined" as defined below? No  To be "be confined" the patient must satisfy all three of the following conditions: (1) unable to get up from bed without Assistance; AND (2) unable to ambulate; AND (3) unable to sit in a chair or wheelchair  3) Can this patient safely be transported by car or wheelchair van (i e , seated during transport without a medical attendant or monitoring)?    No    4) In addition to completing questions 1-3 above, please check any of the following conditions that apply*:   *Note: supporting documentation for any boxes checked must be maintained in the patient's medical records  If hosp-hosp transfer, describe services needed at 2nd facility not available at 1st facility? Moderate/severe pain on movement   Medical attendant required   Unable to tolerate seated position for time needed to transport       Section III - Signature of Physician or Healthcare Professional  I certify that the above information is true and correct based on my evaluation of this patient, and represent that the patient requires transport by ambulance and that other forms of transport are contraindicated  I understand that this information will be used by the Centers for Medicare and Medicaid Services (CMS) to support the determination of medical necessity for ambulance services, and I represent that I have personal knowledge of the patient's condition at time of transport  []  If this box is checked, I also certify that the patient is physically or mentally incapable of signing the ambulance service's claim and that the institution with which I am affiliated has furnished care, services, or assistance to the patient  My signature below is made on behalf of the patient pursuant to 42 CFR §424 36(b)(4)  In accordance with 42 CFR §424 37, the specific reason(s) that the patient is physically or mentally incapable of signing the claim form is as follows: N/A      Signature of Physician* or Healthcare Professional______________________________________________________________  Signature Date 07/14/21 (For scheduled repetitive transports, this form is not valid for transports performed more than 60 days after this date)    Printed Name & Credentials of Physician or Healthcare Professional (MD, DO, RN, etc )__CHRISTY Dorman  *Form must be signed by patient's attending physician for scheduled, repetitive transports   For non-repetitive, unscheduled ambulance transports, if unable to obtain the signature of the attending physician, any of the following may sign (choose appropriate option below)  [] Physician Assistant []  Clinical Nurse Specialist []  Registered Nurse  []  Nurse Practitioner  [x] Discharge Planner

## 2021-07-14 NOTE — DISCHARGE SUMMARY
[address]  Discharge- Bevely Colder 1935, 80 y o  female MRN: [id number]  Unit/Bed#: -01 Encounter: [id number]  Primary Care Provider: Neeru King MD   Date and time admitted to hospital: 7/11/2021  7:20 AM      DOS: 7/14/2021  * Ambulatory dysfunction  Assessment & Plan  · Pt presented to the hospital with generalized weakness and ambulatory dysfunction   · Hx of parkinson's disease with peripheral neuropathy    · Reports episodic dizziness, she believes her sinemet is contributing to her symptoms  This has now resolved   · Neurology following, adjusted her Sinemet dose as below  · PT/OT recommending STR, CM following for placement, pt and family in agreement, to be discharged to [address] [place] [address] today   · Fall precautions   · Supportive care  · CT on admission mainly unremarkable, improved constipation  Abnormal appearance of bladder, however UA negative    Parkinson's disease ([facility] [address] )  Assessment & Plan  · Pt with hx of parkison's disease   · Maintained on Sinemet 25/100 mg QID (2 tab/1 5/1 5/1 5) and carbidopa QID for nausea prior to eating  · Typically walks with walker at baseline, but has had difficulties with ambulation over last several weeks, this is slowly improving now  · Follows with neurology outpatient with [facility]   · Neurology following here,  · Started on extended release Sinemet at bedtime in addition to previous Sinemet regimen   · No additional inpatient neuro recommendations at this time  · PT/OT consultations recommending STR  · Pt is requesting to be placed on hospice, palliative care evaluated, pt does not meet criteria for hospice placement currently  Plan to d/c to rehab with plan on returning home   Continue constipation/bowel regimen with colace, miralax, metamucil, senna and PRN dulcolax suppository    Elevated brain natriuretic peptide (BNP) level  Assessment & Plan  · Mild,    · Reporting intermittent shortness of breath on exertion · Saturating high 90s on RA  · Troponin negative  · Pt appears euvolemic currently  · ECHO with EF 60%, mild to moderate aortic stenosis   · Follow up with cardiology outpatient for further management of aortic stenosis     Constipation-resolved as of 7/14/2021  Assessment & Plan  · Pt with history of abdominal pain and constipation, previous presentation to the hospital on 7/3 for similar complaints   · CT scan with improvement of constipation noted on imaging   · Continue miralax, colace and psyllium, PRN dulcolax suppository and senna   · Large bowel movement noted on 7/13 on nursing documentation  · Continue regimen as above, adjust based on stool output at rehab    Discharging Physician / Practitioner: Jeffy Zarate PA-C  PCP: Gwenlyn Schwab, MD  Admission Date:   Admission Orders (From admission, onward)     Ordered        07/11/21 0910  INPATIENT ADMISSION  Once                   Discharge Date: 07/14/21    Medical Problems     Resolved Problems  Date Reviewed: 7/14/2021        Resolved    Constipation 7/14/2021     Resolved by  Jeffy Zarate PA-C                Consultations During Hospital Stay:  · Palliative care  · Neurology    Procedures Performed:   · CT chest abdomen pelvis 7/11-slight improvement in constipation  Mild abnormal appearance of the urinary bladder  · Echo 7/12-EF 60%  Mild to moderate aortic stenosis    Significant Findings / Test Results:   · Troponin x1 negative  ·   · UA unremarkable  · COVID swab x2 negative    Incidental Findings:   · Mild to moderate aortic stenosis, follow-up with Cardiology outpatient for surveillance     Test Results Pending at Discharge (will require follow up):    · None     Outpatient Tests Requested:  · Per PCP    Complications:  None    Reason for Admission:  Generalized weakness    Hospital Course:     Sukhdeep Celestin is a 80 y o  female patient with significant past medical history of Parkinson's disease who originally presented to the hospital on 7/11/2021 due to generalized weakness and ambulatory dysfunction  Patient also had been complaining of constipation with CT scan noted to have some mild improvement from prior  Patient was seen by Neurology and placed on some long-acting Sinemet at bedtime  She was to continue her Sinemet and carbidopa dosing q i d  As previously prescribed  Patient did have significant improvement of symptoms with medication adjustments  She was placed on bowel regimen by palliative care with resolution of constipation  Patient had improvement of her ambulation with physical therapy and recommended short-term rehab on discharge  Patient to be discharged to [facility]  Cleared from Neurology standpoint for discharge  Patient's symptoms improved throughout hospitalization  For additional information please refer to medical records  Patient was discharged in stable condition  Medication changes include: Addition of Sinemet CR 1 tab QHS, addition of scheduled colace      Please see above list of diagnoses and related plan for additional information  Condition at Discharge: stable     Discharge Day Visit / Exam:     Subjective:  Patient reports that she is feeling much better today  Reports that she was up with physical therapy and appears that she is having improvement with this  She denies any chest pain, shortness of breath, abdominal pain, nausea or vomiting  Reports that her appetite is good  Vitals: Blood Pressure: 110/62 (07/14/21 1247)  Pulse: 68 (07/14/21 1244)  Temperature: 98 3 °F (36 8 °C) (07/14/21 0716)  Temp Source: Oral (07/11/21 0722)  Respirations: 16 (07/14/21 0716)  Height: 4' 10" (147 3 cm) (07/11/21 1553)  Weight - Scale: 45 kg (99 lb 3 3 oz) (07/11/21 1553)  SpO2: 96 % (07/14/21 1244)  Exam:   Physical Exam  Vitals reviewed  Constitutional:       General: She is not in acute distress  Appearance: She is not toxic-appearing        Comments: Pt is in no acute distress sitting in her hospital chair resting comfortably  Frail but overall generally appears improved  Mild tremor noted   HENT:      Head: Normocephalic and atraumatic  Eyes:      Extraocular Movements: Extraocular movements intact  Conjunctiva/sclera: Conjunctivae normal    Cardiovascular:      Rate and Rhythm: Normal rate and regular rhythm  Pulses: Normal pulses  Pulmonary:      Effort: Pulmonary effort is normal  No respiratory distress  Breath sounds: Normal breath sounds  No wheezing  Abdominal:      General: Bowel sounds are normal  There is no distension  Palpations: Abdomen is soft  Tenderness: There is no abdominal tenderness  Musculoskeletal:      Right lower leg: No edema  Left lower leg: No edema  Skin:     General: Skin is warm and dry  Findings: No erythema  Neurological:      Mental Status: She is alert  Psychiatric:         Mood and Affect: Mood normal        Discussion with Family:  Discussed with patient as well as patient's daughter over the phone regarding plan of care  Patient and family continue to be in agreement for discharge to short-term rehab  Discussed medication changes that were made by Neurology with Sinemet  Discharge instructions/Information to patient and family:   See after visit summary for information provided to patient and family  Provisions for Follow-Up Care:  See after visit summary for information related to follow-up care and any pertinent home health orders  Disposition:     [facility] [place] [address] to [facility] SNF:   · Not Applicable to this Patient - Not Applicable to this Patient    Planned Readmission:  None     Discharge Statement:  I spent 40 minutes discharging the patient  This time was spent on the day of discharge  I had direct contact with the patient on the day of discharge   Greater than 50% of the total time was spent examining patient, answering all patient questions, arranging and discussing plan of care with patient as well as directly providing post-discharge instructions  Additional time then spent on discharge activities  Discharge Medications:  See after visit summary for reconciled discharge medications provided to patient and family        ** Please Note: This note has been constructed using a voice recognition system **

## 2021-07-14 NOTE — PLAN OF CARE
Problem: PHYSICAL THERAPY ADULT  Goal: Performs mobility at highest level of function for planned discharge setting  See evaluation for individualized goals  Description: Treatment/Interventions: Functional transfer training, LE strengthening/ROM, Therapeutic exercise, Elevations, Endurance training, Patient/family training, Bed mobility, Gait training, Spoke to nursing, Family  Equipment Recommended: [employer] [place]       See flowsheet documentation for full assessment, interventions and recommendations  Outcome: Progressing  Note: Prognosis: Good  Problem List: Decreased strength, Decreased endurance, Impaired balance, Decreased mobility, Impaired sensation  Assessment: Chart reviewed  Pt was received seated in recliner in NAD and agreeable to PT session  Pt was seen for physical therapy on this date with interventions consisting of therapeutic activity including sit to stand transfers, therapeutic exercises consisting of AROM, 20 reps, bilateral lower extremities in the sitting position, and gait training with emphasis on improving pt's ability to ambulate level surfaces 30 feet x 1 trial and 10 feet x 2 trials with RW and minimal assist provided by therapist  In comparison to previous sessions pt with improvements  Pt able to ambulate an increased distance on level surface with use of RW and with decreased assistance  Pt demonstrated improved step length and step height  Pt tolerated therapeutic exercise well without complaints of pain  Post session pt was left seated in recliner in NAD, +chair alarm  Pt continues to be functioning below baseline level and remains limited secondary to decreased lower extremity strength, impaired balance, decreased endurance, gait deviations, and decreased functional mobility  Continue to recommend STR at time of discharge to maximize pt's functional independence and safety with mobility   PT will continue to see pt while here in order to address the deficits listed above and provide interventions consistent with the POC in effort to achieve short term goals  Barriers to Discharge: Inaccessible home environment     PT Discharge Recommendation: Post acute rehabilitation services     PT - OK to Discharge: Yes (when medically cleared, if to STR)    See flowsheet documentation for full assessment

## 2021-07-14 NOTE — PLAN OF CARE
Problem: Potential for Falls  Goal: Patient will remain free of falls  Description: INTERVENTIONS:  - Educate patient/family on patient safety including physical limitations  - Instruct patient to call for assistance with activity   - Consult OT/PT to assist with strengthening/mobility   - Keep Call bell within reach  - Keep bed low and locked with side rails adjusted as appropriate  - Keep care items and personal belongings within reach  - Initiate and maintain comfort rounds  - Make Fall Risk Sign visible to staff  - Offer Toileting every 2 Hours, in advance of need  - Initiate/Maintain bed and chair alarm  - Obtain necessary fall risk management equipment: walker, commode  - Apply yellow socks and bracelet for high fall risk patients  - Consider moving patient to room near nurses station  Outcome: Progressing     Problem: MOBILITY - ADULT  Goal: Maintain or return to baseline ADL function  Description: INTERVENTIONS:  -  Assess patient's ability to carry out ADLs; assess patient's baseline for ADL function and identify physical deficits which impact ability to perform ADLs (bathing, care of mouth/teeth, toileting, grooming, dressing, etc )  - Assess/evaluate cause of self-care deficits   - Assess range of motion  - Assess patient's mobility; develop plan if impaired  - Assess patient's need for assistive devices and provide as appropriate  - Encourage maximum independence but intervene and supervise when necessary  - Involve family in performance of ADLs  - Assess for home care needs following discharge   - Consider OT consult to assist with ADL evaluation and planning for discharge  - Provide patient education as appropriate  Outcome: Progressing  Goal: Maintains/Returns to pre admission functional level  Description: INTERVENTIONS:  - Perform BMAT or MOVE assessment daily    - Set and communicate daily mobility goal to care team and patient/family/caregiver     - Collaborate with rehabilitation services on mobility goals if consulted  - Stand patient 3 times a day  - Ambulate patient 3 times a day  - Out of bed to chair 3 times a day   - Out of bed for meals 3 times a day  - Out of bed for toileting  - Record patient progress and toleration of activity level   Outcome: Progressing     Problem: GASTROINTESTINAL - ADULT  Goal: Minimal or absence of nausea and/or vomiting  Description: INTERVENTIONS:  - Administer IV fluids if ordered to ensure adequate hydration  - Maintain NPO status until nausea and vomiting are resolved  - Nasogastric tube if ordered  - Administer ordered antiemetic medications as needed  - Provide nonpharmacologic comfort measures as appropriate  - Advance diet as tolerated, if ordered  - Consider nutrition services referral to assist patient with adequate nutrition and appropriate food choices  Outcome: Progressing  Goal: Maintains or returns to baseline bowel function  Description: INTERVENTIONS:  - Assess bowel function  - Encourage oral fluids to ensure adequate hydration  - Administer IV fluids if ordered to ensure adequate hydration  - Administer ordered medications as needed  - Encourage mobilization and activity  - Consider nutritional services referral to assist patient with adequate nutrition and appropriate food choices  Outcome: Progressing  Goal: Maintains adequate nutritional intake  Description: INTERVENTIONS:  - Monitor percentage of each meal consumed  - Identify factors contributing to decreased intake, treat as appropriate  - Assist with meals as needed  - Monitor I&O, weight, and lab values if indicated  - Obtain nutrition services referral as needed  Outcome: Progressing     Problem: METABOLIC, FLUID AND ELECTROLYTES - ADULT  Goal: Electrolytes maintained within normal limits  Description: INTERVENTIONS:  - Monitor labs and assess patient for signs and symptoms of electrolyte imbalances  - Administer electrolyte replacement as ordered  - Monitor response to electrolyte replacements, including repeat lab results as appropriate  - Instruct patient on fluid and nutrition as appropriate  Outcome: Progressing  Goal: Fluid balance maintained  Description: INTERVENTIONS:  - Monitor labs   - Monitor I/O and WT  - Instruct patient on fluid and nutrition as appropriate  - Assess for signs & symptoms of volume excess or deficit  Outcome: Progressing     Problem: SKIN/TISSUE INTEGRITY - ADULT  Goal: Skin Integrity remains intact(Skin Breakdown Prevention)  Description: Assess:  -Perform Marco assessment every shift  -Clean and moisturize skin every shift  -Inspect skin when repositioning, toileting, and assisting with ADLS  -Assess extremities for adequate circulation and sensation     Bed Management:  -Have minimal linens on bed & keep smooth, unwrinkled  -Change linens as needed when moist or perspiring  -Avoid sitting or lying in one position for more than 2 hours while in bed  -Keep HOB at 30-45degrees     Toileting:  -Offer bedside commode  -Assess for incontinence every 2 hours  -Use incontinent care products after each incontinent episode such as silicone cream    Activity:  -Mobilize patient 3 times a day  -Encourage activity and walks on unit  -Encourage or provide ROM exercises   -Turn and reposition patient every 2 Hours  -Use appropriate equipment to lift or move patient in bed  -Instruct/ Assist with weight shifting every 2 when out of bed in chair  -Consider limitation of chair time 4 hour intervals    Skin Care:  -Avoid use of baby powder, tape, friction and shearing, hot water or constrictive clothing  -Relieve pressure over bony prominences using air cushion  -Do not massage red bony areas  Outcome: Progressing     Problem: Prexisting or High Potential for Compromised Skin Integrity  Goal: Skin integrity is maintained or improved  Description: INTERVENTIONS:  - Identify patients at risk for skin breakdown  - Assess and monitor skin integrity  - Assess and monitor nutrition and hydration status  - Monitor labs   - Assess for incontinence   - Turn and reposition patient  - Assist with mobility/ambulation  - Relieve pressure over bony prominences  - Avoid friction and shearing  - Provide appropriate hygiene as needed including keeping skin clean and dry  - Evaluate need for skin moisturizer/barrier cream  - Collaborate with interdisciplinary team   - Patient/family teaching  - Consider wound care consult   Outcome: Progressing

## 2021-07-14 NOTE — PHYSICAL THERAPY NOTE
Physical Therapy Treatment Note     07/14/21 1030   PT Last Visit   PT Visit Date 07/14/21   Note Type   Note Type Treatment   Pain Assessment   Pain Assessment Tool 0-10   Pain Score No Pain   Restrictions/Precautions   Weight Bearing Precautions Per Order No   Other Precautions Chair Alarm; Bed Alarm; Fall Risk   General   Chart Reviewed Yes   Response to Previous Treatment Patient with no complaints from previous session  Family/Caregiver Present No   Cognition   Overall Cognitive Status WFL   Arousal/Participation Alert; Responsive; Cooperative   Attention Within functional limits   Orientation Level Oriented X4   Memory Within functional limits   Following Commands Follows all commands and directions without difficulty   Comments Pt agreeable to PT treatment session  Subjective   Subjective "I haven't done much walking "   Bed Mobility   Additional Comments Pt was received seated in recliner in NAD   Transfers   Sit to Stand 4  Minimal assistance   Additional items Assist x 1; Armrests; Increased time required;Verbal cues   Stand to Sit 4  Minimal assistance   Additional items Assist x 1; Armrests; Increased time required;Verbal cues   Ambulation/Elevation   Gait pattern Excessively slow; Step to;Short stride; Shuffling;Narrow JOSEPHINE; Decreased foot clearance   Gait Assistance 4  Minimal assist   Additional items Assist x 1;Verbal cues   Assistive Device Rolling walker   Distance 30 feet x 1 trial; 10 feet x 2 trials   Stair Management Assistance Not tested   Balance   Static Sitting Fair +   Dynamic Sitting Fair   Static Standing Fair -   Dynamic Standing Poor +   Ambulatory Poor +   Endurance Deficit   Endurance Deficit Yes   Activity Tolerance   Activity Tolerance Patient tolerated treatment well   Nurse Made Aware Discussed case with CARROL Hope; post session pt was left seated in recliner in NAD, all belongings within reach, +chair alarm   Exercises   Hip Flexion Sitting;20 reps;AROM; Bilateral   Hip Adduction Sitting;20 reps;AROM; Bilateral   Knee AROM Long Arc Quad Sitting;20 reps;AROM; Bilateral   Ankle Pumps Sitting;20 reps;AROM; Bilateral   Assessment   Prognosis Good   Problem List Decreased strength;Decreased endurance; Impaired balance;Decreased mobility; Impaired sensation   Assessment Chart reviewed  Pt was received seated in recliner in NAD and agreeable to PT session  Pt was seen for physical therapy on this date with interventions consisting of therapeutic activity including sit to stand transfers, therapeutic exercises consisting of AROM, 20 reps, bilateral lower extremities in the sitting position, and gait training with emphasis on improving pt's ability to ambulate level surfaces 30 feet x 1 trial and 10 feet x 2 trials with RW and minimal assist provided by therapist  In comparison to previous sessions pt with improvements  Pt able to ambulate an increased distance on level surface with use of RW and with decreased assistance  Pt demonstrated improved step length and step height  Pt tolerated therapeutic exercise well without complaints of pain  Post session pt was left seated in recliner in NAD, +chair alarm  Pt continues to be functioning below baseline level and remains limited secondary to decreased lower extremity strength, impaired balance, decreased endurance, gait deviations, and decreased functional mobility  Continue to recommend STR at time of discharge to maximize pt's functional independence and safety with mobility  PT will continue to see pt while here in order to address the deficits listed above and provide interventions consistent with the POC in effort to achieve short term goals  Barriers to Discharge Inaccessible home environment   Goals   STG Expiration Date 07/22/21   Plan   Treatment/Interventions Functional transfer training;LE strengthening/ROM; Therapeutic exercise;Elevations; Endurance training;Patient/family training;Bed mobility;Gait training;Spoke to nursing   Progress Progressing toward goals   PT Frequency Other (Comment)  (3-5x/wk)   Recommendation   PT Discharge Recommendation Post acute rehabilitation services   Equipment Recommended [facility] Recommended Wheeled walker   Change/add to [employer]?  No   PT - OK to Discharge Yes  (when medically cleared, if to STR)   AM-PAC Basic Mobility Inpatient   Turning in Bed Without Bedrails 3   Lying on Back to Sitting on Edge of Flat Bed 3   Moving Bed to Chair 3   Standing Up From Chair 3   Walk in Room 3   Climb 3-5 Stairs 2   Basic Mobility Inpatient Raw Score 17   Basic Mobility Standardized Score 39 67     Mey Suresh, PT, DPT    Time of PT treatment session: 9604-1110  42 minutes

## 2022-10-18 NOTE — TELEPHONE ENCOUNTER
Patient calling in regarding medication and new symptoms  She believes amantadine is making her dizzy, she in turn has fallen and injured her back  Current meds:  Carb-levo 1 tablet 3 times a day  Amantadine (137mg) 1 daily at HS  Patient spoke to pharmacist about medication  Asking if dose can be decreased as she feels this might help dizziness  Please advise  ABDOMINAL PAIN/VOMITING

## 2024-10-30 NOTE — PATIENT INSTRUCTIONS
Constipation   WHAT YOU NEED TO KNOW:   Constipation is when you have hard, dry bowel movements, or you go longer than usual between bowel movements  DISCHARGE INSTRUCTIONS:   Return to the emergency department if:   · You have blood in your bowel movements  · You have a fever and abdominal pain with the constipation  Contact your healthcare provider if:   · Your constipation gets worse  · You start to vomit  · You have questions or concerns about your condition or care  Medicines:   · Medicine or a fiber supplement  may help make your bowel movement softer  A laxative may help relax and loosen your intestines to help you have a bowel movement  You may also be given medicine to increase fluid in your intestines  The fluid may help move bowel movements through your intestines  · Take your medicine as directed  Contact your healthcare provider if you think your medicine is not helping or if you have side effects  Tell him of her if you are allergic to any medicine  Keep a list of the medicines, vitamins, and herbs you take  Include the amounts, and when and why you take them  Bring the list or the pill bottles to follow-up visits  Carry your medicine list with you in case of an emergency  Manage your constipation:   · Drink liquids as directed  You may need to drink extra liquids to help soften and move your bowels  Ask how much liquid to drink each day and which liquids are best for you  · Eat high-fiber foods  This may help decrease constipation by adding bulk to your bowel movements  High-fiber foods include fruit, vegetables, whole-grain breads and cereals, and beans  Your healthcare provider or dietitian can help you create a high-fiber meal plan  · Exercise regularly  Regular physical activity can help stimulate your intestines  Ask which exercises are best for you  · Schedule a time each day to have a bowel movement    This may help train your body to have regular bowel movements  Bend forward while you are on the toilet to help move the bowel movement out  Sit on the toilet for at least 10 minutes, even if you do not have a bowel movement  Follow up with your healthcare provider as directed:  Write down your questions so you remember to ask them during your visits  © 2017 [address] [employer] [employer] Information is for End User's use only and may not be sold, redistributed or otherwise used for commercial purposes  All illustrations and images included in CareNotes® are the copyrighted property of [employer]  or Tino Pena  The above information is an  only  It is not intended as medical advice for individual conditions or treatments  Talk to your doctor, nurse or pharmacist before following any medical regimen to see if it is safe and effective for you